# Patient Record
Sex: MALE | Race: WHITE | NOT HISPANIC OR LATINO | Employment: OTHER | URBAN - METROPOLITAN AREA
[De-identification: names, ages, dates, MRNs, and addresses within clinical notes are randomized per-mention and may not be internally consistent; named-entity substitution may affect disease eponyms.]

---

## 2019-03-04 ENCOUNTER — HOSPITAL ENCOUNTER (EMERGENCY)
Facility: HOSPITAL | Age: 64
Discharge: HOME | End: 2019-03-04
Attending: EMERGENCY MEDICINE | Admitting: EMERGENCY MEDICINE

## 2019-03-04 ENCOUNTER — APPOINTMENT (OUTPATIENT)
Dept: GENERAL RADIOLOGY | Facility: HOSPITAL | Age: 64
End: 2019-03-04

## 2019-03-04 VITALS
WEIGHT: 178.5 LBS | TEMPERATURE: 98.4 F | HEART RATE: 64 BPM | BODY MASS INDEX: 35.05 KG/M2 | DIASTOLIC BLOOD PRESSURE: 73 MMHG | SYSTOLIC BLOOD PRESSURE: 122 MMHG | RESPIRATION RATE: 15 BRPM | HEIGHT: 60 IN | OXYGEN SATURATION: 94 %

## 2019-03-04 DIAGNOSIS — J06.9 UPPER RESPIRATORY TRACT INFECTION, UNSPECIFIED TYPE: Primary | ICD-10-CM

## 2019-03-04 LAB
ALBUMIN SERPL-MCNC: 4.1 G/DL (ref 3.5–5.2)
ALBUMIN/GLOB SERPL: 1.4 G/DL
ALP SERPL-CCNC: 63 U/L (ref 39–117)
ALT SERPL W P-5'-P-CCNC: 17 U/L (ref 1–41)
ANION GAP SERPL CALCULATED.3IONS-SCNC: 14.6 MMOL/L
AST SERPL-CCNC: 19 U/L (ref 1–40)
BASOPHILS # BLD AUTO: 0.03 10*3/MM3 (ref 0–0.2)
BASOPHILS NFR BLD AUTO: 0.6 % (ref 0–1.5)
BILIRUB SERPL-MCNC: <0.2 MG/DL (ref 0.1–1.2)
BUN BLD-MCNC: 12 MG/DL (ref 8–23)
BUN/CREAT SERPL: 13 (ref 7–25)
CALCIUM SPEC-SCNC: 9.3 MG/DL (ref 8.6–10.5)
CHLORIDE SERPL-SCNC: 103 MMOL/L (ref 98–107)
CO2 SERPL-SCNC: 26.4 MMOL/L (ref 22–29)
CREAT BLD-MCNC: 0.92 MG/DL (ref 0.76–1.27)
D-LACTATE SERPL-SCNC: 1.5 MMOL/L (ref 0.5–2)
DEPRECATED RDW RBC AUTO: 53.3 FL (ref 37–54)
EOSINOPHIL # BLD AUTO: 0.28 10*3/MM3 (ref 0–0.4)
EOSINOPHIL NFR BLD AUTO: 6 % (ref 0.3–6.2)
ERYTHROCYTE [DISTWIDTH] IN BLOOD BY AUTOMATED COUNT: 15.3 % (ref 12.3–15.4)
GFR SERPL CREATININE-BSD FRML MDRD: 83 ML/MIN/1.73
GLOBULIN UR ELPH-MCNC: 3 GM/DL
GLUCOSE BLD-MCNC: 88 MG/DL (ref 65–99)
HCT VFR BLD AUTO: 40.2 % (ref 37.5–51)
HGB BLD-MCNC: 12.7 G/DL (ref 13–17.7)
LYMPHOCYTES # BLD AUTO: 2.06 10*3/MM3 (ref 0.7–3.1)
LYMPHOCYTES NFR BLD AUTO: 43.9 % (ref 19.6–45.3)
MCH RBC QN AUTO: 29.7 PG (ref 26.6–33)
MCHC RBC AUTO-ENTMCNC: 31.6 G/DL (ref 31.5–35.7)
MCV RBC AUTO: 94.1 FL (ref 79–97)
MONOCYTES # BLD AUTO: 0.5 10*3/MM3 (ref 0.1–0.9)
MONOCYTES NFR BLD AUTO: 10.7 % (ref 5–12)
NEUTROPHILS # BLD AUTO: 1.82 10*3/MM3 (ref 1.4–7)
NEUTROPHILS NFR BLD AUTO: 38.8 % (ref 42.7–76)
PLATELET # BLD AUTO: 127 10*3/MM3 (ref 140–450)
PMV BLD AUTO: 10.6 FL (ref 6–12)
POTASSIUM BLD-SCNC: 4.3 MMOL/L (ref 3.5–5.2)
PROT SERPL-MCNC: 7.1 G/DL (ref 6–8.5)
RBC # BLD AUTO: 4.27 10*6/MM3 (ref 4.14–5.8)
SODIUM BLD-SCNC: 144 MMOL/L (ref 136–145)
WBC NRBC COR # BLD: 4.69 10*3/MM3 (ref 3.4–10.8)

## 2019-03-04 PROCEDURE — 83605 ASSAY OF LACTIC ACID: CPT | Performed by: EMERGENCY MEDICINE

## 2019-03-04 PROCEDURE — 93005 ELECTROCARDIOGRAM TRACING: CPT | Performed by: EMERGENCY MEDICINE

## 2019-03-04 PROCEDURE — 85025 COMPLETE CBC W/AUTO DIFF WBC: CPT | Performed by: EMERGENCY MEDICINE

## 2019-03-04 PROCEDURE — 99285 EMERGENCY DEPT VISIT HI MDM: CPT

## 2019-03-04 PROCEDURE — 87040 BLOOD CULTURE FOR BACTERIA: CPT | Performed by: EMERGENCY MEDICINE

## 2019-03-04 PROCEDURE — 93010 ELECTROCARDIOGRAM REPORT: CPT | Performed by: INTERNAL MEDICINE

## 2019-03-04 PROCEDURE — 71045 X-RAY EXAM CHEST 1 VIEW: CPT

## 2019-03-04 PROCEDURE — 80053 COMPREHEN METABOLIC PANEL: CPT | Performed by: EMERGENCY MEDICINE

## 2019-03-04 PROCEDURE — 94640 AIRWAY INHALATION TREATMENT: CPT

## 2019-03-04 RX ORDER — POLYETHYLENE GLYCOL 3350 17 G/17G
17 POWDER, FOR SOLUTION ORAL DAILY
COMMUNITY
End: 2021-08-23 | Stop reason: SDUPTHER

## 2019-03-04 RX ORDER — BACLOFEN 10 MG/1
10 TABLET ORAL 2 TIMES DAILY
COMMUNITY
End: 2022-03-16 | Stop reason: HOSPADM

## 2019-03-04 RX ORDER — SODIUM CHLORIDE 0.9 % (FLUSH) 0.9 %
10 SYRINGE (ML) INJECTION AS NEEDED
Status: DISCONTINUED | OUTPATIENT
Start: 2019-03-04 | End: 2019-03-04 | Stop reason: HOSPADM

## 2019-03-04 RX ORDER — LAMOTRIGINE 100 MG/1
100 TABLET ORAL 2 TIMES DAILY
COMMUNITY

## 2019-03-04 RX ORDER — OMEPRAZOLE 40 MG/1
40 CAPSULE, DELAYED RELEASE ORAL EVERY 12 HOURS
COMMUNITY

## 2019-03-04 RX ORDER — BISACODYL 10 MG
10 SUPPOSITORY, RECTAL RECTAL DAILY
COMMUNITY

## 2019-03-04 RX ORDER — SENNOSIDES 8.6 MG
3 TABLET ORAL 2 TIMES DAILY
COMMUNITY

## 2019-03-04 RX ORDER — ASPIRIN 81 MG/1
81 TABLET, CHEWABLE ORAL DAILY
Status: ON HOLD | COMMUNITY
End: 2021-10-12 | Stop reason: SDUPTHER

## 2019-03-04 RX ORDER — ISOSORBIDE DINITRATE 20 MG/1
20 TABLET ORAL EVERY 12 HOURS
COMMUNITY

## 2019-03-04 RX ORDER — IPRATROPIUM BROMIDE AND ALBUTEROL SULFATE 2.5; .5 MG/3ML; MG/3ML
3 SOLUTION RESPIRATORY (INHALATION) EVERY 6 HOURS PRN
COMMUNITY
Start: 2019-03-01 | End: 2019-03-08

## 2019-03-04 RX ORDER — IPRATROPIUM BROMIDE AND ALBUTEROL SULFATE 2.5; .5 MG/3ML; MG/3ML
3 SOLUTION RESPIRATORY (INHALATION) ONCE
Status: COMPLETED | OUTPATIENT
Start: 2019-03-04 | End: 2019-03-04

## 2019-03-04 RX ORDER — LEVETIRACETAM 1000 MG/1
1000 TABLET ORAL 2 TIMES DAILY
COMMUNITY
End: 2022-03-16 | Stop reason: HOSPADM

## 2019-03-04 RX ORDER — EZETIMIBE 10 MG/1
10 TABLET ORAL DAILY
COMMUNITY

## 2019-03-04 RX ORDER — MONTELUKAST SODIUM 10 MG/1
10 TABLET ORAL NIGHTLY
COMMUNITY

## 2019-03-04 RX ORDER — DANTROLENE SODIUM 25 MG/1
25 CAPSULE ORAL DAILY
Status: ON HOLD | COMMUNITY
End: 2022-09-28

## 2019-03-04 RX ORDER — MOMETASONE FUROATE 50 UG/1
2 SPRAY, METERED NASAL DAILY
Status: ON HOLD | COMMUNITY
End: 2022-09-28

## 2019-03-04 RX ORDER — ALBUTEROL SULFATE 2.5 MG/3ML
2.5 SOLUTION RESPIRATORY (INHALATION) EVERY 4 HOURS PRN
COMMUNITY
Start: 2019-03-03

## 2019-03-04 RX ORDER — CHOLECALCIFEROL (VITAMIN D3) 25 MCG
1000 CAPSULE ORAL 2 TIMES DAILY
COMMUNITY
End: 2022-03-16 | Stop reason: HOSPADM

## 2019-03-04 RX ORDER — OSELTAMIVIR PHOSPHATE 75 MG/1
75 CAPSULE ORAL 2 TIMES DAILY
COMMUNITY
Start: 2019-03-01 | End: 2019-03-06

## 2019-03-04 RX ORDER — LEVOTHYROXINE SODIUM 0.12 MG/1
125 TABLET ORAL DAILY
COMMUNITY

## 2019-03-04 RX ORDER — PAROXETINE HYDROCHLORIDE 20 MG/1
40 TABLET, FILM COATED ORAL NIGHTLY
COMMUNITY
End: 2021-08-23 | Stop reason: DRUGHIGH

## 2019-03-04 RX ADMIN — IPRATROPIUM BROMIDE AND ALBUTEROL SULFATE 3 ML: 2.5; .5 SOLUTION RESPIRATORY (INHALATION) at 06:54

## 2019-03-04 NOTE — ED NOTES
Abigail ROSARIO from NH states Pt was Dx with Flu and has been takingTamiflu x3 days. Pt had intermittent fevers which have been treated with Tylenol.  Pt's lab work came back with elevated WBC so a chest Xray was done showing PNA.  Pt's baseline of no o2 with normal sats at 96%.  Pt has caregiver at bedside who states Pt's baseline is only to speak a few words.     Diamond Reardon RN  03/04/19 0600

## 2019-03-04 NOTE — ED PROVIDER NOTES
Pt presents to the ED c/o flu-like symptoms that started a few days ago. Pt was brought in from [facility] today for further evaluation after he had a CXR taken yesterday which showed pneumonia. Pt was dx with influenza 2 days ago. Pt has been taking tamiflu for tx of symptoms.    PHYSICAL EXAM  GENERAL: not distressed  CV: regular rhythm, regular rate  RESPIRATORY: normal effort, no respiratory distress, scattered rhonchi bilaterally, pt's O2 sats are 95% on room air  ABDOMEN: soft, non-tender    Vital signs and nursing notes reviewed.    LAB RESULTS AND RADIOLOGY  I have reviewed the patient's labs and imaging studies. Labs are unremarkable and his CXR is negative acute.     PROGRESS NOTES    0800 Spoke to midlevel provider Talisha Hearn PA-C, about the pt. After performing my own physical exam, I agree with the plan to discharge the pt home.    Attestation:    The JILL and I have discussed this patient's history, physical exam, and treatment plan.  I have reviewed the documentation and personally had a face to face interaction with the patient. I affirm the documentation and agree with the treatment and plan.  The attached note describes my personal findings.    Documentation assistance provided by mendoza Rosado for Dr. Garcia. Information recorded by the scribe was done at my direction and has been verified and validated by me.          Jose Rosado  03/04/19 3020       Robb Garcia MD  03/04/19 1731

## 2019-03-04 NOTE — DISCHARGE INSTRUCTIONS
Continue nebs as scheduled.  Follow up with Primary care provider in 1- 2 days for recheck.  Return to ER for low oxygen, shortness of breath, worsening symptoms.

## 2019-03-04 NOTE — ED NOTES
Called [facility] for transport back to nursing home. ETA 2 hours     Genesis Dang RN  03/04/19 0836

## 2019-03-09 LAB
BACTERIA SPEC AEROBE CULT: NORMAL
BACTERIA SPEC AEROBE CULT: NORMAL

## 2020-03-10 ENCOUNTER — TRANSCRIBE ORDERS (OUTPATIENT)
Dept: ADMINISTRATIVE | Facility: HOSPITAL | Age: 65
End: 2020-03-10

## 2020-03-10 DIAGNOSIS — R13.14 PHARYNGOESOPHAGEAL DYSPHAGIA: Primary | ICD-10-CM

## 2020-03-17 ENCOUNTER — HOSPITAL ENCOUNTER (OUTPATIENT)
Dept: GENERAL RADIOLOGY | Facility: HOSPITAL | Age: 65
Discharge: HOME | End: 2020-03-17
Admitting: INTERNAL MEDICINE

## 2020-03-17 DIAGNOSIS — R13.14 PHARYNGOESOPHAGEAL DYSPHAGIA: ICD-10-CM

## 2020-03-17 PROCEDURE — 92611 MOTION FLUOROSCOPY/SWALLOW: CPT

## 2020-03-17 PROCEDURE — 63710000001 BARIUM SULFATE 40 % SUSPENSION: Performed by: INTERNAL MEDICINE

## 2020-03-17 PROCEDURE — A9270 NON-COVERED ITEM OR SERVICE: HCPCS | Performed by: INTERNAL MEDICINE

## 2020-03-17 PROCEDURE — 63710000001 BARIUM SULFATE 98 % RECONSTITUTED SUSPENSION: Performed by: INTERNAL MEDICINE

## 2020-03-17 PROCEDURE — 74230 X-RAY XM SWLNG FUNCJ C+: CPT

## 2020-03-17 PROCEDURE — 63710000001 BARIUM SULFATE 40 % RECONSTITUTED SUSPENSION: Performed by: INTERNAL MEDICINE

## 2020-03-17 RX ADMIN — BARIUM SULFATE 55 ML: 0.81 POWDER, FOR SUSPENSION ORAL at 11:37

## 2020-03-17 RX ADMIN — BARIUM SULFATE 50 ML: 400 SUSPENSION ORAL at 11:38

## 2020-03-17 RX ADMIN — BARIUM SULFATE 4 ML: 980 POWDER, FOR SUSPENSION ORAL at 11:37

## 2020-03-17 NOTE — MBS/VFSS/FEES
Outpatient Speech Language Pathology   Adult Swallow Initial Evaluation  [facility]     Patient Name: Ned Basilio  : 1955  MRN: [id number]  Today's Date: 3/17/2020         Visit Date: 2020   There is no problem list on file for this patient.       Past Medical History:   Diagnosis Date   • Anxiety    • Arthritis    • Sommers's esophagus    • Cardiomegaly    • Cerebral palsy (CMS/HCC)    • Constipation    • Disease of thyroid gland    • GERD (gastroesophageal reflux disease)    • Hyperlipidemia    • Osteoporosis    • Parkinson disease (CMS/HCC)    • Raynaud's disease    • Right hemiplegia (CMS/HCC)    • Seizures (CMS/HCC)         History reviewed. No pertinent surgical history.      Visit Dx:     ICD-10-CM ICD-9-CM   1. Pharyngoesophageal dysphagia R13.14 787.24           SLP Adult Swallow Evaluation     Row Name 20 1708       Rehab Evaluation    Document Type  evaluation  -AW    Subjective Information  no complaints  -AW    Patient Observations  alert;cooperative;agree to therapy  -AW    Patient/Family Observations  Pt alert, nonverbal, looked at SLP and smiled, did not follow commands.  -AW    Patient Effort  adequate  -AW    Symptoms Noted During/After Treatment  none  -AW       General Information    Patient Profile Reviewed  yes  -AW    Pertinent History Of Current Problem  Pt with h/o cerbral palsy and recent pneumonia and flu. Pt accompanied by Speech Therapist.  -AW    Current Method of Nutrition  pureed;thin liquids  -AW    Precautions/Limitations, Vision  WFL;for purposes of eval  -AW    Precautions/Limitations, Hearing  WFL;for purposes of eval  -AW    Prior Level of Function-Communication  motor speech impairment;cognitive-linguistic impairment  -AW    Prior Level of Function-Swallowing  puree;thin liquids  -AW    Plans/Goals Discussed with  patient;agreed upon;other (see comments) SLP from facility  -AW    Barriers to Rehab  medically complex;previous functional deficit;cognitive  status  -AW    Patient's Goals for Discharge  patient did not state  -AW       Pain Assessment    Additional Documentation  Pain Scale: Numbers Pre/Post-Treatment (Group)  -AW       Pain Scale: Numbers Pre/Post-Treatment    Pain Scale: Numbers, Pretreatment  0/10 - no pain  -AW    Pain Scale: Numbers, Post-Treatment  0/10 - no pain  -AW       Oral Motor and Function    Dentition Assessment  natural, present and adequate;missing teeth  -AW    Secretion Management  WNL/WFL  -AW    Mucosal Quality  moist, healthy  -AW    Volitional Swallow  unable to elicit  -AW    Volitional Cough  unable to elicit  -AW       Oral Musculature and Cranial Nerve Assessment    Oral Motor General Assessment  unable to assess  -AW       General Eating/Swallowing Observations    Respiratory Support Currently in Use  room air  -AW    Eating/Swallowing Skills  fed by SLP  -AW    Positioning During Eating  upright in chair  -AW       MBS/VFSS    Utensils Used  spoon;straw  -AW    Consistencies Trialed  pureed;thin liquids;nectar/syrup-thick liquids  -AW       MBS/VFSS Interpretation    Oral Prep Phase  impaired oral phase of swallowing  -AW    Oral Transit Phase  impaired  -AW    Oral Residue  impaired  -AW    VFSS Summary  Pt presented with moderate oropharyngeal dysphagia characterized by decreased lingual and base of tongue strength, decreased pharyngeal peristalsis, and delayed swallow. Pt with noted L anterior spillage during eval with significant oral residue which pt was unable to clear with cues or liquid wash. Pt given straw trials of thin and nectar and teaspoon trials of pureed. Pt's swallow was delayed 1-9 seconds with all consistencies spilling to the pyriforms. Pt's shoulder blocked full view of pyriforms. No obvious penetration or aspiration was observed during the study, however, the risk is high due to delay and mild to moderate diffuse pharyngeal residual with all consistencies (tongue base, valleculae, pyriforms). Pt was not  able to follow commands to initiate a dry swallow to assist in clearing, and pt did not appear to sensate residuals. Swallow appeared to fatigue as study progressed with longer delay noted. Pt eventually refused to open mouth for additional trials. Recommend pt continue on pureed and thin via straw (single sip and small bites) with 1:1 supervision during meals to adhere to strict swallow strategies. If s/s are noted consider downgrade to nectar thick.   -AW       Initiation of Pharyngeal Swallow    Pharyngeal Phase  impaired pharyngeal phase of swallowing  -AW       SLP Communication to Radiology    Summary Statement  Pt presented with moderate oropharyngeal dysphagia characterized by decreased lingual and base of tongue strength, decreased pharyngeal peristalsis, and delayed swallow. Pt with noted L anterior spillage during eval with significant oral residue which pt was unable to clear with cues or liquid wash. Pt given straw trials of thin and nectar and teaspoon trials of pureed. Pt's swallow was delayed 1-9 seconds with all consistencies spilling to the pyriforms. Pt's shoulder blocked full view of pyriforms. No obvious penetration or aspiration was observed during the study, however, the risk is high due to delay and mild to moderate diffuse pharyngeal residual with all consistencies (tongue base, valleculae, pyriforms)  -AW       Clinical Impression    SLP Swallowing Diagnosis  moderate;oral dysfunction;pharyngeal dysfunction  -AW    Functional Impact  risk of aspiration/pneumonia;risk of malnutrition;risk of dehydration  -AW    Swallow Criteria for Skilled Therapeutic Interventions Met  other (see comments);demonstrates skilled criteria for education on swallow strategies  -AW       Recommendations    SLP Diet Recommendation  puree;thin liquids  -AW    Recommended Precautions and Strategies  upright posture during/after eating;small bites of food and sips of liquid;check mouth frequently for oral  residue/pocketing;alternate between small bites of food and sips of liquid  -AW    SLP Rec. for Method of Medication Administration  meds crushed;with pudding or applesauce  -AW    Monitor for Signs of Aspiration  yes  -AW    Anticipated Dischage Disposition  extended care facility  -AW      User Key  (r) = Recorded By, (t) = Taken By, (c) = Cosigned By    Initials Name Provider Type    MASHA HowardenCharleen MS CCC-SLP Speech and Language Pathologist                        OP SLP Education     Row Name 03/17/20 1807       Education    Barriers to Learning  Decreased comprehension  -AW    Action Taken to Address Barriers  Education completed with pt's SLP.  -AW    Education Provided  Described results of evaluation;Family/caregivers expressed understanding of evaluation;Family/caregivers participated in establishing goals and treatment plan;Family/caregivers demonstrated recommended strategies  -AW    Assessed  Learning needs;Learning motivation;Learning preferences;Learning readiness  -AW    Learning Motivation  Strong  -AW    Learning Method  Explanation;Demonstration;Teach back  -AW    Teaching Response  Verbalized understanding;Demonstrated understanding  -AW      User Key  (r) = Recorded By, (t) = Taken By, (c) = Cosigned By    Initials Name Effective Dates    Charleen Li MS CCC-SLP 06/08/18 -               OP SLP Assessment/Plan - 03/17/20 1806        SLP Assessment    Functional Problems  Swallowing   -AW    Clinical Impression: Swallowing  Moderate:;oropharyngeal phase dysphagia   -AW    Prognosis  Good (comment)   -AW    Patient/caregiver participated in establishment of treatment plan and goals  Yes    Pt's SLP present.  -AW    Patient would benefit from skilled therapy intervention  Yes    For caregiver education  -AW       SLP Plan    Planned CPT's?  SLP SWALLOW THERAPY: 01821   -AW    Expected Duration Therapy Session - minutes  45-60 minutes   -AW      User Key  (r) = Recorded By, (t) = Taken By, (c) =  Cosigned By    Initials Name Provider Type    Charleen Li MS CCC-SLP Speech and Language Pathologist              SLP Outcome Measures (last 72 hours)      SLP Outcome Measures     Row Name 03/17/20 1800             SLP Outcome Measures    Outcome Measure Used?  Adult NOMS  -AW         Adult FCM Scores    FCM Chosen  Swallowing  -AW      Swallowing FCM Score  4  -AW        User Key  (r) = Recorded By, (t) = Taken By, (c) = Cosigned By    Initials Name Effective Dates    Charleen Li MS CCC-SLP 06/08/18 -                Time Calculation:   SLP Start Time: 1100  SLP Stop Time: 1215  SLP Time Calculation (min): 75 min    Therapy Charges for Today     Code Description Service Date Service Provider Modifiers Qty    [id number] HC ST MOTION FLUORO EVAL SWALLOW 5 3/17/2020 Charleen Ann MS CCC-SLP GN 1                   Charleen Ann MS CCC-SLP  3/17/2020

## 2020-12-10 ENCOUNTER — LAB REQUISITION (OUTPATIENT)
Dept: LAB | Facility: HOSPITAL | Age: 65
End: 2020-12-10

## 2020-12-10 DIAGNOSIS — Z00.00 ENCOUNTER FOR GENERAL ADULT MEDICAL EXAMINATION WITHOUT ABNORMAL FINDINGS: ICD-10-CM

## 2020-12-10 PROCEDURE — U0004 COV-19 TEST NON-CDC HGH THRU: HCPCS | Performed by: OPHTHALMOLOGY

## 2020-12-11 LAB — SARS-COV-2 RNA RESP QL NAA+PROBE: NOT DETECTED

## 2021-01-19 NOTE — PROGRESS NOTES
No chief complaint on file.  Sommers's esophagus, GERD, history of colon polyps          History of Present Illness  65-year old male presents today for follow up. He is a resident of [place]. He has been accompanied by a caregiver during his visit to assist in answering questions. Patient is non-verbal. His caregiver reports abdominal distention. He has not had any recent imaging.     His last colonoscopy and EGD were performed on 1/24/2019 due to his history of iron deficiency anemia, revealing a 4 mm polyp in the transverse colon, identified as a tubular adenoma on pathology, and melanotic mucosa throughout the colon.  Due to poor bowel prep, patient was recommended to undergo his colonoscopy at a 1 year interval, which would have been due on 1/24/2020.    EGD revealed mucosal findings consistent with short segment Sommers's esophagus, which were confirmed on biopsy. Next EGD for surveillance of Sommers's esophagus will be due at a 3-year interval on 1/24/2022.    He takes omeprazole 40 mg twice daily.          Result Review :          Vital Signs:   /80   Pulse 67   Temp 97.1 °F (36.2 °C)   Resp 20   SpO2 94%     There is no height or weight on file to calculate BMI.     Physical Exam  Vitals signs reviewed.   Constitutional:       Appearance: Normal appearance.   Abdominal:      General: Bowel sounds are normal. There is distension.      Palpations: Abdomen is soft. Abdomen is not rigid. There is no mass or pulsatile mass.      Tenderness: There is no abdominal tenderness. There is no guarding or rebound.             Assessment and Plan      Diagnoses and all orders for this visit:    1. Sommers's esophagus without dysplasia (Primary)  Comments:  Patient due for surveillance in January 2022.    2. Gastroesophageal reflux disease without esophagitis    3. History of colon polyps  Comments:  Patient was due for surveillance in January 2020 due to poor prep on prior colonoscopy.    4. Abdominal  distention  Comments:  new    Other orders  -     XR Abdomen KUB; Future         Brief Summary  We will send the patient x-ray today for an abdominal x-ray due to abdominal distention I will schedule his surveillance colonoscopy.           Follow Up   No follow-ups on file.     Patient Instructions   1. For colon cancer screening, we will schedule a colonoscopy.    2. For abdominal distention we will order an abdominal x-ray today for further evaluation.

## 2021-01-20 ENCOUNTER — PREP FOR SURGERY (OUTPATIENT)
Dept: OTHER | Facility: HOSPITAL | Age: 66
End: 2021-01-20

## 2021-01-20 ENCOUNTER — OFFICE VISIT (OUTPATIENT)
Dept: GASTROENTEROLOGY | Facility: CLINIC | Age: 66
End: 2021-01-20

## 2021-01-20 ENCOUNTER — HOSPITAL ENCOUNTER (OUTPATIENT)
Dept: GENERAL RADIOLOGY | Facility: HOSPITAL | Age: 66
Discharge: HOME | End: 2021-01-20
Admitting: INTERNAL MEDICINE

## 2021-01-20 VITALS
SYSTOLIC BLOOD PRESSURE: 122 MMHG | OXYGEN SATURATION: 94 % | DIASTOLIC BLOOD PRESSURE: 80 MMHG | HEART RATE: 67 BPM | TEMPERATURE: 97.1 F | RESPIRATION RATE: 20 BRPM

## 2021-01-20 DIAGNOSIS — Z86.010 HISTORY OF COLON POLYPS: ICD-10-CM

## 2021-01-20 DIAGNOSIS — R14.0 ABDOMINAL DISTENTION: ICD-10-CM

## 2021-01-20 DIAGNOSIS — K22.70 BARRETT'S ESOPHAGUS WITHOUT DYSPLASIA: Primary | Chronic | ICD-10-CM

## 2021-01-20 DIAGNOSIS — Z12.11 COLON CANCER SCREENING: ICD-10-CM

## 2021-01-20 DIAGNOSIS — Z86.010 HISTORY OF COLON POLYPS: Primary | ICD-10-CM

## 2021-01-20 DIAGNOSIS — K21.9 GASTROESOPHAGEAL REFLUX DISEASE WITHOUT ESOPHAGITIS: Chronic | ICD-10-CM

## 2021-01-20 PROBLEM — Z86.0100 HISTORY OF COLON POLYPS: Status: ACTIVE | Noted: 2021-01-20

## 2021-01-20 PROCEDURE — 74018 RADEX ABDOMEN 1 VIEW: CPT

## 2021-01-20 PROCEDURE — 99214 OFFICE O/P EST MOD 30 MIN: CPT | Performed by: INTERNAL MEDICINE

## 2021-01-20 RX ORDER — FLUTICASONE PROPIONATE 50 MCG
SPRAY, SUSPENSION (ML) NASAL
COMMUNITY
Start: 2021-01-13

## 2021-01-20 RX ORDER — CHLORHEXIDINE GLUCONATE 0.12 MG/ML
RINSE ORAL
COMMUNITY
Start: 2021-01-13

## 2021-01-20 RX ORDER — TRAZODONE HYDROCHLORIDE 100 MG/1
TABLET ORAL
Status: ON HOLD | COMMUNITY
Start: 2021-01-13 | End: 2022-03-09

## 2021-01-20 NOTE — PATIENT INSTRUCTIONS
1. For colon cancer screening, we will schedule a colonoscopy.    2. For abdominal distention we will order an abdominal x-ray today for further evaluation.

## 2021-01-22 RX ORDER — SIMETHICONE 80 MG
160 TABLET,CHEWABLE ORAL 2 TIMES DAILY
Qty: 120 TABLET | Refills: 5 | Status: SHIPPED | OUTPATIENT
Start: 2021-01-22 | End: 2021-06-15

## 2021-01-22 NOTE — TELEPHONE ENCOUNTER
Recommend simethicone 160 mg tablets 2 p.o. daily.  Okay to do tapwater enemas as needed.  Also recommend increase activity level as much as possible to mobilize the gas.     Please approve for me, thanks

## 2021-02-22 NOTE — TELEPHONE ENCOUNTER
Can you send in the bowel prep for this patient. Pharmacy verified correct in chart. Jennifer received a call from the patient statin it was not at the pharmacy. He has Plenvu prep instructions.       Thanks,  David

## 2021-03-02 ENCOUNTER — TRANSCRIBE ORDERS (OUTPATIENT)
Dept: SLEEP MEDICINE | Facility: HOSPITAL | Age: 66
End: 2021-03-02

## 2021-03-02 DIAGNOSIS — Z01.818 OTHER SPECIFIED PRE-OPERATIVE EXAMINATION: Primary | ICD-10-CM

## 2021-03-02 NOTE — TELEPHONE ENCOUNTER
Hi, with [facility], called stating Mr. Basilio is still having issues with bloating and gas. He was seen on 1/20/21 for this issue and on 1/22/21 began Simethicone 160mg twice daily with no relief. Would like to know what else to try? Patient is aware of upcoming Colonoscopy on 3/15/21.

## 2021-03-12 ENCOUNTER — LAB (OUTPATIENT)
Dept: LAB | Facility: HOSPITAL | Age: 66
End: 2021-03-12

## 2021-03-12 DIAGNOSIS — Z01.818 OTHER SPECIFIED PRE-OPERATIVE EXAMINATION: ICD-10-CM

## 2021-03-12 PROCEDURE — U0005 INFEC AGEN DETEC AMPLI PROBE: HCPCS

## 2021-03-12 PROCEDURE — C9803 HOPD COVID-19 SPEC COLLECT: HCPCS

## 2021-03-12 PROCEDURE — U0004 COV-19 TEST NON-CDC HGH THRU: HCPCS

## 2021-03-13 LAB — SARS-COV-2 ORF1AB RESP QL NAA+PROBE: NOT DETECTED

## 2021-03-15 ENCOUNTER — TELEPHONE (OUTPATIENT)
Dept: GASTROENTEROLOGY | Facility: CLINIC | Age: 66
End: 2021-03-15

## 2021-03-15 NOTE — TELEPHONE ENCOUNTER
at [place] ([facility] ) called and stated pt was not going to make his procedure this morning, we rescheduled to May , moved appt with Renetta and made endo aware

## 2021-03-22 ENCOUNTER — BULK ORDERING (OUTPATIENT)
Dept: CASE MANAGEMENT | Facility: OTHER | Age: 66
End: 2021-03-22

## 2021-03-22 DIAGNOSIS — Z23 IMMUNIZATION DUE: ICD-10-CM

## 2021-04-27 ENCOUNTER — TRANSCRIBE ORDERS (OUTPATIENT)
Dept: SLEEP MEDICINE | Facility: HOSPITAL | Age: 66
End: 2021-04-27

## 2021-04-27 DIAGNOSIS — Z01.818 OTHER SPECIFIED PRE-OPERATIVE EXAMINATION: Primary | ICD-10-CM

## 2021-04-27 RX ORDER — POLYETHYLENE GLYCOL 3350, SODIUM SULFATE, SODIUM CHLORIDE, POTASSIUM CHLORIDE, ASCORBIC ACID, SODIUM ASCORBATE 140-9-5.2G
KIT ORAL
Qty: 3 EACH | Refills: 0 | Status: ON HOLD | OUTPATIENT
Start: 2021-04-27 | End: 2022-03-11

## 2021-05-14 ENCOUNTER — LAB (OUTPATIENT)
Dept: LAB | Facility: HOSPITAL | Age: 66
End: 2021-05-14

## 2021-05-14 DIAGNOSIS — Z01.818 OTHER SPECIFIED PRE-OPERATIVE EXAMINATION: ICD-10-CM

## 2021-05-14 PROCEDURE — C9803 HOPD COVID-19 SPEC COLLECT: HCPCS

## 2021-05-14 PROCEDURE — U0005 INFEC AGEN DETEC AMPLI PROBE: HCPCS

## 2021-05-14 PROCEDURE — U0004 COV-19 TEST NON-CDC HGH THRU: HCPCS

## 2021-05-15 LAB — SARS-COV-2 ORF1AB RESP QL NAA+PROBE: NOT DETECTED

## 2021-06-15 RX ORDER — SIMETHICONE 80 MG/1
TABLET, CHEWABLE ORAL
Qty: 120 TABLET | Refills: 11 | Status: ON HOLD | OUTPATIENT
Start: 2021-06-15 | End: 2022-09-28

## 2021-08-11 NOTE — PROGRESS NOTES
"Chief Complaint   Patient presents with   • Bloated         History of Present Illness  Patient is a 65-year-old male who presents today for follow-up. He has a history of colon polyps, Sommers's esophagus.  He resides at [facility] and is nonverbal and in a wheelchair.  Caregiver assists with history.    He is currently scheduled for a colonoscopy for screening purposes and surveillance of polyps October 2021 due to poor prep on attempted colonoscopy in 2019. An abdominal x-ray performed January 2021 showed gaseous distention of the right colon.    Patient presents today for follow-up.  His caregivers have noticed that his abdomen is becoming more bloated.  At times it is visibly distended and firm.  He has had no reported vomiting.  Currently on a bowel regimen consisting of Senokot, MiraLAX, stool softeners, and enemas as needed and he is having regular bowel movements with no visible bleeding.    He follows a puréed diet but has been eating well with good appetite.  Patient is nonverbal but caregiver reports he has not appeared uncomfortable or in pain.       Result Review :       PROGRESS NOTES - SCAN - MAE ERAZO MD (12/10/2018)   SCANNED - COLONOSCOPY (01/24/2019)   XR Abdomen KUB (01/20/2021 14:18)   Office Visit with Cedric Erazo MD (01/20/2021)       Vital Signs:   /80   Temp 97.8 °F (36.6 °C) (Temporal)   Resp 18   Ht 152.4 cm (60\")   Wt 103 kg (228 lb)   BMI 44.53 kg/m²     Body mass index is 44.53 kg/m².     Physical Exam  Vitals reviewed.   Constitutional:       General: He is not in acute distress.     Appearance: He is well-developed.   HENT:      Head: Normocephalic and atraumatic.   Pulmonary:      Effort: Pulmonary effort is normal. No respiratory distress.   Abdominal:      General: Bowel sounds are decreased. There is distension.      Tenderness: There is no abdominal tenderness.   Skin:     General: Skin is dry.      Coloration: Skin is not pale.   Neurological: "      Mental Status: He is alert and oriented to person, place, and time.   Psychiatric:         Thought Content: Thought content normal.           Assessment and Plan    Diagnoses and all orders for this visit:    1. Bloating (Primary)  -     CT Abdomen Pelvis With Contrast; Future    2. History of colon polyps    3. Abdominal distention         Discussion  Patient presents today for evaluation of worsening abdominal bloating and distention.  Exam in the office today with abdominal distention and somewhat hypoactive bowel sounds.  We will schedule CT abdomen pelvis with contrast to evaluate for any evidence of obstruction.  As caregivers report regular bowel movements with current regimen, will continue current bowel regimen at this time however if there is a large stool burden on imaging, we may need to adjust regimen.          Follow Up   Return if symptoms worsen or fail to improve.    There are no Patient Instructions on file for this visit.

## 2021-08-23 ENCOUNTER — OFFICE VISIT (OUTPATIENT)
Dept: GASTROENTEROLOGY | Facility: CLINIC | Age: 66
End: 2021-08-23

## 2021-08-23 VITALS
BODY MASS INDEX: 44.76 KG/M2 | TEMPERATURE: 97.8 F | RESPIRATION RATE: 18 BRPM | SYSTOLIC BLOOD PRESSURE: 132 MMHG | HEIGHT: 60 IN | WEIGHT: 228 LBS | DIASTOLIC BLOOD PRESSURE: 80 MMHG

## 2021-08-23 DIAGNOSIS — R14.0 BLOATING: Primary | ICD-10-CM

## 2021-08-23 DIAGNOSIS — R14.0 ABDOMINAL DISTENTION: ICD-10-CM

## 2021-08-23 DIAGNOSIS — Z86.010 HISTORY OF COLON POLYPS: ICD-10-CM

## 2021-08-23 PROBLEM — E03.9 HYPOTHYROIDISM: Status: ACTIVE | Noted: 2021-08-23

## 2021-08-23 PROCEDURE — 99214 OFFICE O/P EST MOD 30 MIN: CPT | Performed by: NURSE PRACTITIONER

## 2021-08-23 RX ORDER — ZINC OXIDE 13 %
CREAM (GRAM) TOPICAL
COMMUNITY
Start: 2021-08-18

## 2021-08-23 RX ORDER — MULTIVITAMIN,THER AND MINERALS
TABLET ORAL
COMMUNITY
Start: 2021-08-13

## 2021-08-23 RX ORDER — POLYETHYLENE GLYCOL 3350 17 G/17G
POWDER, FOR SOLUTION ORAL
COMMUNITY
Start: 2021-07-14 | End: 2021-10-20 | Stop reason: HOSPADM

## 2021-08-23 RX ORDER — PAROXETINE 30 MG/1
TABLET, FILM COATED ORAL
Status: ON HOLD | COMMUNITY
Start: 2021-08-13 | End: 2022-03-11

## 2021-09-22 ENCOUNTER — APPOINTMENT (OUTPATIENT)
Dept: CT IMAGING | Facility: HOSPITAL | Age: 66
End: 2021-09-22

## 2021-09-28 ENCOUNTER — HOSPITAL ENCOUNTER (OUTPATIENT)
Dept: CT IMAGING | Facility: HOSPITAL | Age: 66
Discharge: HOME | End: 2021-09-28
Admitting: NURSE PRACTITIONER

## 2021-09-28 DIAGNOSIS — R14.0 BLOATING: ICD-10-CM

## 2021-09-28 LAB — CREAT BLDA-MCNC: 0.9 MG/DL (ref 0.6–1.3)

## 2021-09-28 PROCEDURE — 74177 CT ABD & PELVIS W/CONTRAST: CPT

## 2021-09-28 PROCEDURE — 82565 ASSAY OF CREATININE: CPT

## 2021-09-28 PROCEDURE — 25010000002 IOPAMIDOL 61 % SOLUTION: Performed by: NURSE PRACTITIONER

## 2021-09-28 PROCEDURE — 0 DIATRIZOATE MEGLUMINE & SODIUM PER 1 ML: Performed by: NURSE PRACTITIONER

## 2021-09-28 RX ADMIN — DIATRIZOATE MEGLUMINE AND DIATRIZOATE SODIUM 30 ML: 660; 100 LIQUID ORAL; RECTAL at 14:39

## 2021-09-28 RX ADMIN — IOPAMIDOL 85 ML: 612 INJECTION, SOLUTION INTRAVENOUS at 14:39

## 2021-10-05 ENCOUNTER — TELEPHONE (OUTPATIENT)
Dept: GASTROENTEROLOGY | Facility: CLINIC | Age: 66
End: 2021-10-05

## 2021-10-05 NOTE — TELEPHONE ENCOUNTER
We had a voicemail left 10/04/21 at 1:08pm from (what it sounded like) Jovanny Bailon with [facility] wanting to r/s pt CLS. Please call to get r/s. Thanks.

## 2021-10-06 NOTE — TELEPHONE ENCOUNTER
Spoke with Jonathon from pts [facility] on 10/05/2021 and advised that pt would have to be done at [facility] and that the schedule is not out that far yet and that we would call to schedule once next years schedule is out. She gave phone number [phone] to call back.

## 2021-10-07 ENCOUNTER — TELEPHONE (OUTPATIENT)
Dept: GASTROENTEROLOGY | Facility: CLINIC | Age: 66
End: 2021-10-07

## 2021-10-07 DIAGNOSIS — K59.04 CHRONIC IDIOPATHIC CONSTIPATION: Primary | ICD-10-CM

## 2021-10-07 RX ORDER — PLECANATIDE 3 MG/1
3 TABLET ORAL DAILY
Qty: 30 TABLET | Refills: 5 | Status: SHIPPED | OUTPATIENT
Start: 2021-10-07 | End: 2022-02-08

## 2021-10-07 NOTE — TELEPHONE ENCOUNTER
Patient resides in a group home.  His insurance prefers Linzess as Tr[facility] is not covered by his insurance.  Is it ok to change to Linzess and pharmacy is asking if it's ok if he starts the medication on Monday or Tuesday?  [facility]  [phone]  Sent in new rx for Linzess 145mcg 90 w 3 refills.  pk/arpit

## 2021-10-10 ENCOUNTER — APPOINTMENT (OUTPATIENT)
Dept: GENERAL RADIOLOGY | Facility: HOSPITAL | Age: 66
End: 2021-10-10

## 2021-10-10 ENCOUNTER — APPOINTMENT (OUTPATIENT)
Dept: CT IMAGING | Facility: HOSPITAL | Age: 66
End: 2021-10-10

## 2021-10-10 ENCOUNTER — HOSPITAL ENCOUNTER (INPATIENT)
Facility: HOSPITAL | Age: 66
LOS: 8 days | Discharge: NURSING, REHAB OR LONG-TERM CARE | End: 2021-10-19
Attending: EMERGENCY MEDICINE | Admitting: HOSPITALIST

## 2021-10-10 DIAGNOSIS — G80.9 CEREBRAL PALSY, UNSPECIFIED TYPE (HCC): ICD-10-CM

## 2021-10-10 DIAGNOSIS — S72.331A CLOSED DISPLACED OBLIQUE FRACTURE OF SHAFT OF RIGHT FEMUR, INITIAL ENCOUNTER (HCC): Primary | ICD-10-CM

## 2021-10-10 LAB
ALBUMIN SERPL-MCNC: 4.1 G/DL (ref 3.5–5.2)
ALBUMIN/GLOB SERPL: 1.5 G/DL
ALP SERPL-CCNC: 89 U/L (ref 39–117)
ALT SERPL W P-5'-P-CCNC: 17 U/L (ref 1–41)
ANION GAP SERPL CALCULATED.3IONS-SCNC: 7.8 MMOL/L (ref 5–15)
AST SERPL-CCNC: 18 U/L (ref 1–40)
BASOPHILS # BLD AUTO: 0.05 10*3/MM3 (ref 0–0.2)
BASOPHILS NFR BLD AUTO: 0.4 % (ref 0–1.5)
BILIRUB SERPL-MCNC: 0.2 MG/DL (ref 0–1.2)
BUN SERPL-MCNC: 5 MG/DL (ref 8–23)
BUN/CREAT SERPL: 6.4 (ref 7–25)
CALCIUM SPEC-SCNC: 8.8 MG/DL (ref 8.6–10.5)
CHLORIDE SERPL-SCNC: 103 MMOL/L (ref 98–107)
CO2 SERPL-SCNC: 29.2 MMOL/L (ref 22–29)
CREAT SERPL-MCNC: 0.78 MG/DL (ref 0.76–1.27)
DEPRECATED RDW RBC AUTO: 46.9 FL (ref 37–54)
EOSINOPHIL # BLD AUTO: 0.23 10*3/MM3 (ref 0–0.4)
EOSINOPHIL NFR BLD AUTO: 2 % (ref 0.3–6.2)
ERYTHROCYTE [DISTWIDTH] IN BLOOD BY AUTOMATED COUNT: 12.6 % (ref 12.3–15.4)
GFR SERPL CREATININE-BSD FRML MDRD: 100 ML/MIN/1.73
GLOBULIN UR ELPH-MCNC: 2.8 GM/DL
GLUCOSE SERPL-MCNC: 106 MG/DL (ref 65–99)
HCT VFR BLD AUTO: 42.2 % (ref 37.5–51)
HGB BLD-MCNC: 13.6 G/DL (ref 13–17.7)
IMM GRANULOCYTES # BLD AUTO: 0.03 10*3/MM3 (ref 0–0.05)
IMM GRANULOCYTES NFR BLD AUTO: 0.3 % (ref 0–0.5)
INR PPP: 1.01 (ref 0.9–1.1)
LYMPHOCYTES # BLD AUTO: 1.71 10*3/MM3 (ref 0.7–3.1)
LYMPHOCYTES NFR BLD AUTO: 15.2 % (ref 19.6–45.3)
MCH RBC QN AUTO: 32.1 PG (ref 26.6–33)
MCHC RBC AUTO-ENTMCNC: 32.2 G/DL (ref 31.5–35.7)
MCV RBC AUTO: 99.5 FL (ref 79–97)
MONOCYTES # BLD AUTO: 0.88 10*3/MM3 (ref 0.1–0.9)
MONOCYTES NFR BLD AUTO: 7.8 % (ref 5–12)
NEUTROPHILS NFR BLD AUTO: 74.3 % (ref 42.7–76)
NEUTROPHILS NFR BLD AUTO: 8.33 10*3/MM3 (ref 1.7–7)
NRBC BLD AUTO-RTO: 0 /100 WBC (ref 0–0.2)
PLATELET # BLD AUTO: 245 10*3/MM3 (ref 140–450)
PMV BLD AUTO: 10.2 FL (ref 6–12)
POTASSIUM SERPL-SCNC: 4.1 MMOL/L (ref 3.5–5.2)
PROT SERPL-MCNC: 6.9 G/DL (ref 6–8.5)
PROTHROMBIN TIME: 13.1 SECONDS (ref 11.7–14.2)
RBC # BLD AUTO: 4.24 10*6/MM3 (ref 4.14–5.8)
SODIUM SERPL-SCNC: 140 MMOL/L (ref 136–145)
WBC # BLD AUTO: 11.23 10*3/MM3 (ref 3.4–10.8)

## 2021-10-10 PROCEDURE — 74176 CT ABD & PELVIS W/O CONTRAST: CPT

## 2021-10-10 PROCEDURE — 84145 PROCALCITONIN (PCT): CPT | Performed by: HOSPITALIST

## 2021-10-10 PROCEDURE — U0005 INFEC AGEN DETEC AMPLI PROBE: HCPCS | Performed by: EMERGENCY MEDICINE

## 2021-10-10 PROCEDURE — 80053 COMPREHEN METABOLIC PANEL: CPT | Performed by: EMERGENCY MEDICINE

## 2021-10-10 PROCEDURE — 25010000002 FENTANYL CITRATE (PF) 50 MCG/ML SOLUTION: Performed by: EMERGENCY MEDICINE

## 2021-10-10 PROCEDURE — 99285 EMERGENCY DEPT VISIT HI MDM: CPT

## 2021-10-10 PROCEDURE — 73552 X-RAY EXAM OF FEMUR 2/>: CPT

## 2021-10-10 PROCEDURE — 85025 COMPLETE CBC W/AUTO DIFF WBC: CPT | Performed by: EMERGENCY MEDICINE

## 2021-10-10 PROCEDURE — 25010000002 ONDANSETRON PER 1 MG: Performed by: EMERGENCY MEDICINE

## 2021-10-10 PROCEDURE — 85610 PROTHROMBIN TIME: CPT | Performed by: EMERGENCY MEDICINE

## 2021-10-10 PROCEDURE — 71045 X-RAY EXAM CHEST 1 VIEW: CPT

## 2021-10-10 PROCEDURE — U0003 INFECTIOUS AGENT DETECTION BY NUCLEIC ACID (DNA OR RNA); SEVERE ACUTE RESPIRATORY SYNDROME CORONAVIRUS 2 (SARS-COV-2) (CORONAVIRUS DISEASE [COVID-19]), AMPLIFIED PROBE TECHNIQUE, MAKING USE OF HIGH THROUGHPUT TECHNOLOGIES AS DESCRIBED BY CMS-2020-01-R: HCPCS | Performed by: EMERGENCY MEDICINE

## 2021-10-10 PROCEDURE — 70450 CT HEAD/BRAIN W/O DYE: CPT

## 2021-10-10 PROCEDURE — 99284 EMERGENCY DEPT VISIT MOD MDM: CPT

## 2021-10-10 RX ORDER — SODIUM CHLORIDE 0.9 % (FLUSH) 0.9 %
10 SYRINGE (ML) INJECTION AS NEEDED
Status: DISCONTINUED | OUTPATIENT
Start: 2021-10-10 | End: 2021-10-20 | Stop reason: HOSPADM

## 2021-10-10 RX ORDER — FENTANYL CITRATE 50 UG/ML
50 INJECTION, SOLUTION INTRAMUSCULAR; INTRAVENOUS ONCE
Status: COMPLETED | OUTPATIENT
Start: 2021-10-10 | End: 2021-10-10

## 2021-10-10 RX ORDER — ONDANSETRON 2 MG/ML
4 INJECTION INTRAMUSCULAR; INTRAVENOUS ONCE
Status: COMPLETED | OUTPATIENT
Start: 2021-10-10 | End: 2021-10-10

## 2021-10-10 RX ADMIN — ONDANSETRON 4 MG: 2 INJECTION INTRAMUSCULAR; INTRAVENOUS at 22:21

## 2021-10-10 RX ADMIN — FENTANYL CITRATE 50 MCG: 50 INJECTION, SOLUTION INTRAMUSCULAR; INTRAVENOUS at 22:21

## 2021-10-11 ENCOUNTER — ANESTHESIA EVENT (OUTPATIENT)
Dept: PERIOP | Facility: HOSPITAL | Age: 66
End: 2021-10-11

## 2021-10-11 ENCOUNTER — APPOINTMENT (OUTPATIENT)
Dept: GENERAL RADIOLOGY | Facility: HOSPITAL | Age: 66
End: 2021-10-11

## 2021-10-11 ENCOUNTER — ANESTHESIA (OUTPATIENT)
Dept: PERIOP | Facility: HOSPITAL | Age: 66
End: 2021-10-11

## 2021-10-11 PROBLEM — K59.09 CHRONIC CONSTIPATION: Status: ACTIVE | Noted: 2021-10-11

## 2021-10-11 PROBLEM — S72.331A CLOSED DISPLACED OBLIQUE FRACTURE OF SHAFT OF RIGHT FEMUR (HCC): Status: ACTIVE | Noted: 2021-10-11

## 2021-10-11 PROBLEM — G40.909 SEIZURE DISORDER: Status: ACTIVE | Noted: 2021-10-11

## 2021-10-11 LAB
PROCALCITONIN SERPL-MCNC: 0.03 NG/ML (ref 0–0.25)
SARS-COV-2 RNA RESP QL NAA+PROBE: NOT DETECTED

## 2021-10-11 PROCEDURE — C1713 ANCHOR/SCREW BN/BN,TIS/BN: HCPCS | Performed by: ORTHOPAEDIC SURGERY

## 2021-10-11 PROCEDURE — 25010000002 FENTANYL CITRATE (PF) 50 MCG/ML SOLUTION: Performed by: NURSE ANESTHETIST, CERTIFIED REGISTERED

## 2021-10-11 PROCEDURE — 25010000003 LIDOCAINE 1 % SOLUTION 20 ML VIAL: Performed by: ORTHOPAEDIC SURGERY

## 2021-10-11 PROCEDURE — 76000 FLUOROSCOPY <1 HR PHYS/QHP: CPT | Performed by: ORTHOPAEDIC SURGERY

## 2021-10-11 PROCEDURE — 0QS806Z REPOSITION RIGHT FEMORAL SHAFT WITH INTRAMEDULLARY INTERNAL FIXATION DEVICE, OPEN APPROACH: ICD-10-PCS | Performed by: ORTHOPAEDIC SURGERY

## 2021-10-11 PROCEDURE — 25010000002 HYDROMORPHONE 1 MG/ML SOLUTION

## 2021-10-11 PROCEDURE — 25010000003 LEVETIRACETAM IN NACL 0.75% 1000 MG/100ML SOLUTION

## 2021-10-11 PROCEDURE — 25010000002 PROPOFOL 10 MG/ML EMULSION: Performed by: NURSE ANESTHETIST, CERTIFIED REGISTERED

## 2021-10-11 PROCEDURE — 25010000002 PHENYLEPHRINE 10 MG/ML SOLUTION: Performed by: ANESTHESIOLOGY

## 2021-10-11 PROCEDURE — 73502 X-RAY EXAM HIP UNI 2-3 VIEWS: CPT

## 2021-10-11 PROCEDURE — C1769 GUIDE WIRE: HCPCS | Performed by: ORTHOPAEDIC SURGERY

## 2021-10-11 PROCEDURE — 25010000002 SUCCINYLCHOLINE PER 20 MG: Performed by: NURSE ANESTHETIST, CERTIFIED REGISTERED

## 2021-10-11 PROCEDURE — 25010000002 DEXAMETHASONE PER 1 MG: Performed by: NURSE ANESTHETIST, CERTIFIED REGISTERED

## 2021-10-11 PROCEDURE — 25010000003 CEFAZOLIN IN DEXTROSE 2-4 GM/100ML-% SOLUTION: Performed by: ORTHOPAEDIC SURGERY

## 2021-10-11 PROCEDURE — 25010000002 CEFAZOLIN 1-4 GM/50ML-% SOLUTION: Performed by: NURSE ANESTHETIST, CERTIFIED REGISTERED

## 2021-10-11 PROCEDURE — 27506 TREATMENT OF THIGH FRACTURE: CPT | Performed by: ORTHOPAEDIC SURGERY

## 2021-10-11 PROCEDURE — 99222 1ST HOSP IP/OBS MODERATE 55: CPT | Performed by: ORTHOPAEDIC SURGERY

## 2021-10-11 DEVICE — LOCKING SCREW
Type: IMPLANTABLE DEVICE | Site: FEMUR | Status: FUNCTIONAL
Brand: T2 ALPHA

## 2021-10-11 DEVICE — FEMORAL NAIL PF, RIGHT
Type: IMPLANTABLE DEVICE | Site: FEMUR | Status: FUNCTIONAL
Brand: T2 ALPHA

## 2021-10-11 DEVICE — LAG SCREW, RECON
Type: IMPLANTABLE DEVICE | Site: FEMUR | Status: FUNCTIONAL
Brand: T2

## 2021-10-11 RX ORDER — POLYETHYLENE GLYCOL 3350 17 G/17G
17 POWDER, FOR SOLUTION ORAL DAILY
Status: DISCONTINUED | OUTPATIENT
Start: 2021-10-11 | End: 2021-10-20 | Stop reason: HOSPADM

## 2021-10-11 RX ORDER — SODIUM CHLORIDE, SODIUM LACTATE, POTASSIUM CHLORIDE, CALCIUM CHLORIDE 600; 310; 30; 20 MG/100ML; MG/100ML; MG/100ML; MG/100ML
75 INJECTION, SOLUTION INTRAVENOUS CONTINUOUS
Status: DISCONTINUED | OUTPATIENT
Start: 2021-10-12 | End: 2021-10-20 | Stop reason: HOSPADM

## 2021-10-11 RX ORDER — IPRATROPIUM BROMIDE AND ALBUTEROL SULFATE 2.5; .5 MG/3ML; MG/3ML
3 SOLUTION RESPIRATORY (INHALATION) 3 TIMES DAILY
Status: DISCONTINUED | OUTPATIENT
Start: 2021-10-11 | End: 2021-10-20 | Stop reason: HOSPADM

## 2021-10-11 RX ORDER — SODIUM CHLORIDE, SODIUM LACTATE, POTASSIUM CHLORIDE, CALCIUM CHLORIDE 600; 310; 30; 20 MG/100ML; MG/100ML; MG/100ML; MG/100ML
INJECTION, SOLUTION INTRAVENOUS CONTINUOUS PRN
Status: DISCONTINUED | OUTPATIENT
Start: 2021-10-11 | End: 2021-10-11 | Stop reason: SURG

## 2021-10-11 RX ORDER — DOCUSATE SODIUM 100 MG/1
100 CAPSULE, LIQUID FILLED ORAL 2 TIMES DAILY
Status: DISCONTINUED | OUTPATIENT
Start: 2021-10-12 | End: 2021-10-20 | Stop reason: HOSPADM

## 2021-10-11 RX ORDER — ASPIRIN 81 MG/1
81 TABLET ORAL DAILY
Status: DISCONTINUED | OUTPATIENT
Start: 2021-10-12 | End: 2021-10-12

## 2021-10-11 RX ORDER — PROPOFOL 10 MG/ML
VIAL (ML) INTRAVENOUS AS NEEDED
Status: DISCONTINUED | OUTPATIENT
Start: 2021-10-11 | End: 2021-10-11 | Stop reason: SURG

## 2021-10-11 RX ORDER — PAROXETINE 30 MG/1
30 TABLET, FILM COATED ORAL DAILY
Status: DISCONTINUED | OUTPATIENT
Start: 2021-10-12 | End: 2021-10-20 | Stop reason: HOSPADM

## 2021-10-11 RX ORDER — BISACODYL 10 MG
10 SUPPOSITORY, RECTAL RECTAL DAILY
Status: DISCONTINUED | OUTPATIENT
Start: 2021-10-11 | End: 2021-10-20 | Stop reason: HOSPADM

## 2021-10-11 RX ORDER — HYDROMORPHONE HYDROCHLORIDE 1 MG/ML
0.5 INJECTION, SOLUTION INTRAMUSCULAR; INTRAVENOUS; SUBCUTANEOUS
Status: DISCONTINUED | OUTPATIENT
Start: 2021-10-11 | End: 2021-10-11 | Stop reason: HOSPADM

## 2021-10-11 RX ORDER — FLUMAZENIL 0.1 MG/ML
0.2 INJECTION INTRAVENOUS AS NEEDED
Status: DISCONTINUED | OUTPATIENT
Start: 2021-10-11 | End: 2021-10-11 | Stop reason: HOSPADM

## 2021-10-11 RX ORDER — PROMETHAZINE HYDROCHLORIDE 25 MG/1
25 TABLET ORAL ONCE AS NEEDED
Status: DISCONTINUED | OUTPATIENT
Start: 2021-10-11 | End: 2021-10-11 | Stop reason: HOSPADM

## 2021-10-11 RX ORDER — CEFAZOLIN SODIUM 1 G/50ML
INJECTION, SOLUTION INTRAVENOUS AS NEEDED
Status: DISCONTINUED | OUTPATIENT
Start: 2021-10-11 | End: 2021-10-11 | Stop reason: SURG

## 2021-10-11 RX ORDER — ISOSORBIDE DINITRATE 20 MG/1
20 TABLET ORAL EVERY 12 HOURS
Status: DISCONTINUED | OUTPATIENT
Start: 2021-10-12 | End: 2021-10-20 | Stop reason: HOSPADM

## 2021-10-11 RX ORDER — NALOXONE HCL 0.4 MG/ML
0.4 VIAL (ML) INJECTION
Status: DISCONTINUED | OUTPATIENT
Start: 2021-10-11 | End: 2021-10-20 | Stop reason: HOSPADM

## 2021-10-11 RX ORDER — FLUTICASONE PROPIONATE 50 MCG
2 SPRAY, SUSPENSION (ML) NASAL DAILY
Status: CANCELLED | OUTPATIENT
Start: 2021-10-11

## 2021-10-11 RX ORDER — PROMETHAZINE HYDROCHLORIDE 25 MG/1
25 SUPPOSITORY RECTAL ONCE AS NEEDED
Status: DISCONTINUED | OUTPATIENT
Start: 2021-10-11 | End: 2021-10-11 | Stop reason: HOSPADM

## 2021-10-11 RX ORDER — HYDROCODONE BITARTRATE AND ACETAMINOPHEN 7.5; 325 MG/1; MG/1
1 TABLET ORAL EVERY 4 HOURS PRN
Status: DISCONTINUED | OUTPATIENT
Start: 2021-10-11 | End: 2021-10-20 | Stop reason: HOSPADM

## 2021-10-11 RX ORDER — ACETAMINOPHEN 650 MG/1
650 SUPPOSITORY RECTAL EVERY 4 HOURS PRN
Status: DISCONTINUED | OUTPATIENT
Start: 2021-10-11 | End: 2021-10-20 | Stop reason: HOSPADM

## 2021-10-11 RX ORDER — ALBUTEROL SULFATE 2.5 MG/3ML
2.5 SOLUTION RESPIRATORY (INHALATION) EVERY 4 HOURS PRN
Status: DISCONTINUED | OUTPATIENT
Start: 2021-10-11 | End: 2021-10-20 | Stop reason: HOSPADM

## 2021-10-11 RX ORDER — CEFAZOLIN SODIUM 2 G/100ML
2 INJECTION, SOLUTION INTRAVENOUS ONCE
Status: COMPLETED | OUTPATIENT
Start: 2021-10-11 | End: 2021-10-11

## 2021-10-11 RX ORDER — HYDRALAZINE HYDROCHLORIDE 20 MG/ML
5 INJECTION INTRAMUSCULAR; INTRAVENOUS
Status: DISCONTINUED | OUTPATIENT
Start: 2021-10-11 | End: 2021-10-11 | Stop reason: HOSPADM

## 2021-10-11 RX ORDER — SODIUM CHLORIDE 9 MG/ML
75 INJECTION, SOLUTION INTRAVENOUS CONTINUOUS
Status: DISCONTINUED | OUTPATIENT
Start: 2021-10-11 | End: 2021-10-12

## 2021-10-11 RX ORDER — UREA 10 %
3 LOTION (ML) TOPICAL NIGHTLY PRN
Status: DISCONTINUED | OUTPATIENT
Start: 2021-10-11 | End: 2021-10-20 | Stop reason: HOSPADM

## 2021-10-11 RX ORDER — TRAZODONE HYDROCHLORIDE 50 MG/1
50 TABLET ORAL NIGHTLY
Status: DISCONTINUED | OUTPATIENT
Start: 2021-10-12 | End: 2021-10-20 | Stop reason: HOSPADM

## 2021-10-11 RX ORDER — LAMOTRIGINE 100 MG/1
100 TABLET ORAL 2 TIMES DAILY
Status: DISCONTINUED | OUTPATIENT
Start: 2021-10-11 | End: 2021-10-20 | Stop reason: HOSPADM

## 2021-10-11 RX ORDER — ONDANSETRON 4 MG/1
4 TABLET, FILM COATED ORAL EVERY 6 HOURS PRN
Status: DISCONTINUED | OUTPATIENT
Start: 2021-10-11 | End: 2021-10-20 | Stop reason: HOSPADM

## 2021-10-11 RX ORDER — EPHEDRINE SULFATE 50 MG/ML
INJECTION, SOLUTION INTRAVENOUS AS NEEDED
Status: DISCONTINUED | OUTPATIENT
Start: 2021-10-11 | End: 2021-10-11 | Stop reason: SURG

## 2021-10-11 RX ORDER — ACETAMINOPHEN 325 MG/1
650 TABLET ORAL EVERY 4 HOURS PRN
Status: DISCONTINUED | OUTPATIENT
Start: 2021-10-11 | End: 2021-10-20 | Stop reason: HOSPADM

## 2021-10-11 RX ORDER — LEVOTHYROXINE SODIUM 0.12 MG/1
125 TABLET ORAL EVERY MORNING
Status: DISCONTINUED | OUTPATIENT
Start: 2021-10-12 | End: 2021-10-20 | Stop reason: HOSPADM

## 2021-10-11 RX ORDER — BISACODYL 10 MG
10 SUPPOSITORY, RECTAL RECTAL DAILY PRN
Status: DISCONTINUED | OUTPATIENT
Start: 2021-10-11 | End: 2021-10-20 | Stop reason: HOSPADM

## 2021-10-11 RX ORDER — ROCURONIUM BROMIDE 10 MG/ML
INJECTION, SOLUTION INTRAVENOUS AS NEEDED
Status: DISCONTINUED | OUTPATIENT
Start: 2021-10-11 | End: 2021-10-11 | Stop reason: SURG

## 2021-10-11 RX ORDER — SUCCINYLCHOLINE CHLORIDE 20 MG/ML
INJECTION INTRAMUSCULAR; INTRAVENOUS AS NEEDED
Status: DISCONTINUED | OUTPATIENT
Start: 2021-10-11 | End: 2021-10-11 | Stop reason: SURG

## 2021-10-11 RX ORDER — FENTANYL CITRATE 50 UG/ML
INJECTION, SOLUTION INTRAMUSCULAR; INTRAVENOUS AS NEEDED
Status: DISCONTINUED | OUTPATIENT
Start: 2021-10-11 | End: 2021-10-11 | Stop reason: SURG

## 2021-10-11 RX ORDER — LIDOCAINE HYDROCHLORIDE 40 MG/ML
SOLUTION TOPICAL AS NEEDED
Status: DISCONTINUED | OUTPATIENT
Start: 2021-10-11 | End: 2021-10-11 | Stop reason: SURG

## 2021-10-11 RX ORDER — LEVETIRACETAM 500 MG/1
1000 TABLET ORAL 2 TIMES DAILY
Status: DISCONTINUED | OUTPATIENT
Start: 2021-10-12 | End: 2021-10-19

## 2021-10-11 RX ORDER — SODIUM CHLORIDE, SODIUM LACTATE, POTASSIUM CHLORIDE, CALCIUM CHLORIDE 600; 310; 30; 20 MG/100ML; MG/100ML; MG/100ML; MG/100ML
9 INJECTION, SOLUTION INTRAVENOUS CONTINUOUS
Status: DISCONTINUED | OUTPATIENT
Start: 2021-10-11 | End: 2021-10-12

## 2021-10-11 RX ORDER — LIDOCAINE HYDROCHLORIDE 10 MG/ML
0.5 INJECTION, SOLUTION EPIDURAL; INFILTRATION; INTRACAUDAL; PERINEURAL ONCE AS NEEDED
Status: DISCONTINUED | OUTPATIENT
Start: 2021-10-11 | End: 2021-10-11 | Stop reason: HOSPADM

## 2021-10-11 RX ORDER — CEFAZOLIN SODIUM 2 G/100ML
2 INJECTION, SOLUTION INTRAVENOUS EVERY 8 HOURS
Status: COMPLETED | OUTPATIENT
Start: 2021-10-12 | End: 2021-10-12

## 2021-10-11 RX ORDER — DIPHENHYDRAMINE HYDROCHLORIDE 50 MG/ML
12.5 INJECTION INTRAMUSCULAR; INTRAVENOUS
Status: DISCONTINUED | OUTPATIENT
Start: 2021-10-11 | End: 2021-10-11 | Stop reason: HOSPADM

## 2021-10-11 RX ORDER — ONDANSETRON 2 MG/ML
4 INJECTION INTRAMUSCULAR; INTRAVENOUS EVERY 6 HOURS PRN
Status: DISCONTINUED | OUTPATIENT
Start: 2021-10-11 | End: 2021-10-20 | Stop reason: HOSPADM

## 2021-10-11 RX ORDER — BACLOFEN 10 MG/1
10 TABLET ORAL 2 TIMES DAILY
Status: DISCONTINUED | OUTPATIENT
Start: 2021-10-11 | End: 2021-10-20 | Stop reason: HOSPADM

## 2021-10-11 RX ORDER — FENTANYL CITRATE 50 UG/ML
50 INJECTION, SOLUTION INTRAMUSCULAR; INTRAVENOUS
Status: DISCONTINUED | OUTPATIENT
Start: 2021-10-11 | End: 2021-10-11 | Stop reason: HOSPADM

## 2021-10-11 RX ORDER — HYDROCODONE BITARTRATE AND ACETAMINOPHEN 7.5; 325 MG/1; MG/1
1 TABLET ORAL ONCE AS NEEDED
Status: DISCONTINUED | OUTPATIENT
Start: 2021-10-11 | End: 2021-10-11 | Stop reason: HOSPADM

## 2021-10-11 RX ORDER — PHENYLEPHRINE HYDROCHLORIDE 10 MG/ML
INJECTION INTRAVENOUS AS NEEDED
Status: DISCONTINUED | OUTPATIENT
Start: 2021-10-11 | End: 2021-10-11 | Stop reason: SURG

## 2021-10-11 RX ORDER — LEVETIRACETAM 10 MG/ML
1000 INJECTION INTRAVASCULAR EVERY 12 HOURS SCHEDULED
Status: DISCONTINUED | OUTPATIENT
Start: 2021-10-11 | End: 2021-10-11

## 2021-10-11 RX ORDER — MELATONIN
1000 2 TIMES DAILY
Status: DISCONTINUED | OUTPATIENT
Start: 2021-10-11 | End: 2021-10-20 | Stop reason: HOSPADM

## 2021-10-11 RX ORDER — DANTROLENE SODIUM 25 MG/1
25 CAPSULE ORAL DAILY
Status: DISCONTINUED | OUTPATIENT
Start: 2021-10-12 | End: 2021-10-20 | Stop reason: HOSPADM

## 2021-10-11 RX ORDER — SODIUM CHLORIDE 0.9 % (FLUSH) 0.9 %
3-10 SYRINGE (ML) INJECTION AS NEEDED
Status: DISCONTINUED | OUTPATIENT
Start: 2021-10-11 | End: 2021-10-11 | Stop reason: HOSPADM

## 2021-10-11 RX ORDER — LIDOCAINE HYDROCHLORIDE 20 MG/ML
INJECTION, SOLUTION INFILTRATION; PERINEURAL AS NEEDED
Status: DISCONTINUED | OUTPATIENT
Start: 2021-10-11 | End: 2021-10-11 | Stop reason: SURG

## 2021-10-11 RX ORDER — SENNA PLUS 8.6 MG/1
3 TABLET ORAL 2 TIMES DAILY
Status: DISCONTINUED | OUTPATIENT
Start: 2021-10-12 | End: 2021-10-20 | Stop reason: HOSPADM

## 2021-10-11 RX ORDER — FLUTICASONE PROPIONATE 50 MCG
1 SPRAY, SUSPENSION (ML) NASAL DAILY
Status: DISCONTINUED | OUTPATIENT
Start: 2021-10-12 | End: 2021-10-20 | Stop reason: HOSPADM

## 2021-10-11 RX ORDER — PANTOPRAZOLE SODIUM 40 MG/1
40 TABLET, DELAYED RELEASE ORAL EVERY MORNING
Status: DISCONTINUED | OUTPATIENT
Start: 2021-10-12 | End: 2021-10-20 | Stop reason: HOSPADM

## 2021-10-11 RX ORDER — SODIUM CHLORIDE 0.9 % (FLUSH) 0.9 %
3 SYRINGE (ML) INJECTION EVERY 12 HOURS SCHEDULED
Status: DISCONTINUED | OUTPATIENT
Start: 2021-10-12 | End: 2021-10-20 | Stop reason: HOSPADM

## 2021-10-11 RX ORDER — IBUPROFEN 600 MG/1
600 TABLET ORAL ONCE AS NEEDED
Status: DISCONTINUED | OUTPATIENT
Start: 2021-10-11 | End: 2021-10-11 | Stop reason: HOSPADM

## 2021-10-11 RX ORDER — SODIUM CHLORIDE 0.9 % (FLUSH) 0.9 %
10 SYRINGE (ML) INJECTION AS NEEDED
Status: DISCONTINUED | OUTPATIENT
Start: 2021-10-11 | End: 2021-10-20 | Stop reason: HOSPADM

## 2021-10-11 RX ORDER — NALOXONE HCL 0.4 MG/ML
0.2 VIAL (ML) INJECTION AS NEEDED
Status: DISCONTINUED | OUTPATIENT
Start: 2021-10-11 | End: 2021-10-11 | Stop reason: HOSPADM

## 2021-10-11 RX ORDER — LABETALOL HYDROCHLORIDE 5 MG/ML
5 INJECTION, SOLUTION INTRAVENOUS
Status: DISCONTINUED | OUTPATIENT
Start: 2021-10-11 | End: 2021-10-11 | Stop reason: HOSPADM

## 2021-10-11 RX ORDER — MAGNESIUM HYDROXIDE 1200 MG/15ML
LIQUID ORAL AS NEEDED
Status: DISCONTINUED | OUTPATIENT
Start: 2021-10-11 | End: 2021-10-11 | Stop reason: HOSPADM

## 2021-10-11 RX ORDER — DIPHENHYDRAMINE HCL 25 MG
25 CAPSULE ORAL
Status: DISCONTINUED | OUTPATIENT
Start: 2021-10-11 | End: 2021-10-11 | Stop reason: HOSPADM

## 2021-10-11 RX ORDER — MONTELUKAST SODIUM 10 MG/1
10 TABLET ORAL NIGHTLY
Status: DISCONTINUED | OUTPATIENT
Start: 2021-10-12 | End: 2021-10-20 | Stop reason: HOSPADM

## 2021-10-11 RX ORDER — HYDROCODONE BITARTRATE AND ACETAMINOPHEN 7.5; 325 MG/1; MG/1
2 TABLET ORAL EVERY 4 HOURS PRN
Status: DISCONTINUED | OUTPATIENT
Start: 2021-10-11 | End: 2021-10-20 | Stop reason: HOSPADM

## 2021-10-11 RX ORDER — EPHEDRINE SULFATE 50 MG/ML
5 INJECTION, SOLUTION INTRAVENOUS ONCE AS NEEDED
Status: DISCONTINUED | OUTPATIENT
Start: 2021-10-11 | End: 2021-10-11 | Stop reason: HOSPADM

## 2021-10-11 RX ORDER — ONDANSETRON 2 MG/ML
4 INJECTION INTRAMUSCULAR; INTRAVENOUS ONCE AS NEEDED
Status: DISCONTINUED | OUTPATIENT
Start: 2021-10-11 | End: 2021-10-11 | Stop reason: HOSPADM

## 2021-10-11 RX ORDER — OXYCODONE AND ACETAMINOPHEN 10; 325 MG/1; MG/1
1 TABLET ORAL EVERY 4 HOURS PRN
Status: DISCONTINUED | OUTPATIENT
Start: 2021-10-11 | End: 2021-10-11 | Stop reason: HOSPADM

## 2021-10-11 RX ORDER — DEXAMETHASONE SODIUM PHOSPHATE 4 MG/ML
INJECTION, SOLUTION INTRA-ARTICULAR; INTRALESIONAL; INTRAMUSCULAR; INTRAVENOUS; SOFT TISSUE AS NEEDED
Status: DISCONTINUED | OUTPATIENT
Start: 2021-10-11 | End: 2021-10-11 | Stop reason: SURG

## 2021-10-11 RX ORDER — SODIUM CHLORIDE 0.9 % (FLUSH) 0.9 %
3 SYRINGE (ML) INJECTION EVERY 12 HOURS SCHEDULED
Status: DISCONTINUED | OUTPATIENT
Start: 2021-10-11 | End: 2021-10-11 | Stop reason: HOSPADM

## 2021-10-11 RX ADMIN — LEVETIRACETAM 1000 MG: 1000 INJECTION, SOLUTION INTRAVENOUS at 04:45

## 2021-10-11 RX ADMIN — SODIUM CHLORIDE, POTASSIUM CHLORIDE, SODIUM LACTATE AND CALCIUM CHLORIDE 9 ML/HR: 600; 310; 30; 20 INJECTION, SOLUTION INTRAVENOUS at 16:35

## 2021-10-11 RX ADMIN — FENTANYL CITRATE 25 MCG: 0.05 INJECTION, SOLUTION INTRAMUSCULAR; INTRAVENOUS at 18:12

## 2021-10-11 RX ADMIN — POLYETHYLENE GLYCOL 3350 17 G: 17 POWDER, FOR SOLUTION ORAL at 23:34

## 2021-10-11 RX ADMIN — METOPROLOL TARTRATE 2.5 MG: 5 INJECTION, SOLUTION INTRAVENOUS at 04:41

## 2021-10-11 RX ADMIN — EPHEDRINE SULFATE 20 MG: 50 INJECTION INTRAVENOUS at 16:57

## 2021-10-11 RX ADMIN — ROCURONIUM BROMIDE 20 MG: 50 INJECTION INTRAVENOUS at 17:48

## 2021-10-11 RX ADMIN — LIDOCAINE HYDROCHLORIDE 1 EACH: 40 SOLUTION TOPICAL at 16:55

## 2021-10-11 RX ADMIN — SODIUM CHLORIDE 75 ML/HR: 9 INJECTION, SOLUTION INTRAVENOUS at 03:07

## 2021-10-11 RX ADMIN — HYDROMORPHONE HYDROCHLORIDE 1 MG: 1 INJECTION, SOLUTION INTRAMUSCULAR; INTRAVENOUS; SUBCUTANEOUS at 15:04

## 2021-10-11 RX ADMIN — ROCURONIUM BROMIDE 10 MG: 50 INJECTION INTRAVENOUS at 18:22

## 2021-10-11 RX ADMIN — SODIUM CHLORIDE, POTASSIUM CHLORIDE, SODIUM LACTATE AND CALCIUM CHLORIDE: 600; 310; 30; 20 INJECTION, SOLUTION INTRAVENOUS at 16:21

## 2021-10-11 RX ADMIN — SUGAMMADEX 200 MG: 100 INJECTION, SOLUTION INTRAVENOUS at 20:54

## 2021-10-11 RX ADMIN — ROCURONIUM BROMIDE 30 MG: 50 INJECTION INTRAVENOUS at 17:09

## 2021-10-11 RX ADMIN — SUCCINYLCHOLINE CHLORIDE 160 MG: 20 INJECTION, SOLUTION INTRAMUSCULAR; INTRAVENOUS; PARENTERAL at 16:53

## 2021-10-11 RX ADMIN — PROPOFOL 130 MG: 10 INJECTION, EMULSION INTRAVENOUS at 16:53

## 2021-10-11 RX ADMIN — ROCURONIUM BROMIDE 15 MG: 50 INJECTION INTRAVENOUS at 19:10

## 2021-10-11 RX ADMIN — DEXAMETHASONE SODIUM PHOSPHATE 8 MG: 4 INJECTION, SOLUTION INTRAMUSCULAR; INTRAVENOUS at 17:17

## 2021-10-11 RX ADMIN — SODIUM CHLORIDE, POTASSIUM CHLORIDE, SODIUM LACTATE AND CALCIUM CHLORIDE: 600; 310; 30; 20 INJECTION, SOLUTION INTRAVENOUS at 20:54

## 2021-10-11 RX ADMIN — ROCURONIUM BROMIDE 15 MG: 50 INJECTION INTRAVENOUS at 18:39

## 2021-10-11 RX ADMIN — METOPROLOL TARTRATE 25 MG: 25 TABLET, FILM COATED ORAL at 23:35

## 2021-10-11 RX ADMIN — ROCURONIUM BROMIDE 10 MG: 50 INJECTION INTRAVENOUS at 16:53

## 2021-10-11 RX ADMIN — SODIUM CHLORIDE, POTASSIUM CHLORIDE, SODIUM LACTATE AND CALCIUM CHLORIDE 100 ML/HR: 600; 310; 30; 20 INJECTION, SOLUTION INTRAVENOUS at 23:35

## 2021-10-11 RX ADMIN — FENTANYL CITRATE 50 MCG: 0.05 INJECTION, SOLUTION INTRAMUSCULAR; INTRAVENOUS at 17:28

## 2021-10-11 RX ADMIN — LIDOCAINE HYDROCHLORIDE 100 MG: 20 INJECTION, SOLUTION INFILTRATION; PERINEURAL at 16:53

## 2021-10-11 RX ADMIN — SODIUM CHLORIDE, POTASSIUM CHLORIDE, SODIUM LACTATE AND CALCIUM CHLORIDE: 600; 310; 30; 20 INJECTION, SOLUTION INTRAVENOUS at 18:30

## 2021-10-11 RX ADMIN — ROCURONIUM BROMIDE 20 MG: 50 INJECTION INTRAVENOUS at 17:30

## 2021-10-11 RX ADMIN — EPHEDRINE SULFATE 10 MG: 50 INJECTION INTRAVENOUS at 17:46

## 2021-10-11 RX ADMIN — CEFAZOLIN SODIUM 2 G: 2 INJECTION, SOLUTION INTRAVENOUS at 16:35

## 2021-10-11 RX ADMIN — ROCURONIUM BROMIDE 10 MG: 50 INJECTION INTRAVENOUS at 18:03

## 2021-10-11 RX ADMIN — HYDROMORPHONE HYDROCHLORIDE 1 MG: 1 INJECTION, SOLUTION INTRAMUSCULAR; INTRAVENOUS; SUBCUTANEOUS at 11:23

## 2021-10-11 RX ADMIN — FENTANYL CITRATE 25 MCG: 0.05 INJECTION, SOLUTION INTRAMUSCULAR; INTRAVENOUS at 18:09

## 2021-10-11 RX ADMIN — PHENYLEPHRINE HYDROCHLORIDE 100 MCG: 10 INJECTION, SOLUTION INTRAVENOUS at 20:28

## 2021-10-11 RX ADMIN — CEFAZOLIN SODIUM 2 G: 1 INJECTION, SOLUTION INTRAVENOUS at 17:25

## 2021-10-11 RX ADMIN — SODIUM CHLORIDE, PRESERVATIVE FREE 3 ML: 5 INJECTION INTRAVENOUS at 23:29

## 2021-10-11 NOTE — ANESTHESIA PROCEDURE NOTES
Airway  Urgency: elective    Date/Time: 10/11/2021 4:55 PM  Airway not difficult    General Information and Staff    Patient location during procedure: OR  Anesthesiologist: Mik Salguero MD  CRNA: Robb Thakur CRNA    Indications and Patient Condition  Indications for airway management: airway protection    Preoxygenated: yes  MILS not maintained throughout  Mask difficulty assessment: 1 - vent by mask    Final Airway Details  Final airway type: endotracheal airway      Successful airway: ETT  Cuffed: yes   Successful intubation technique: direct laryngoscopy  Endotracheal tube insertion site: oral  Blade: Argenis  Blade size: 3  ETT size (mm): 7.5  Cormack-Lehane Classification: grade I - full view of glottis  Placement verified by: chest auscultation and capnometry   Cuff volume (mL): 8  Measured from: lips  ETT/EBT  to lips (cm): 22  Number of attempts at approach: 1  Assessment: lips, teeth, and gum same as pre-op and atraumatic intubation    Additional Comments  Pre O2, SIAI

## 2021-10-11 NOTE — PLAN OF CARE
Goal Outcome Evaluation:  Plan of Care Reviewed With: patient        Progress: no change  Outcome Summary: Pt new admit from ER, pt had a witnessed fall by the caregiver at the [facility] while on cameron lift, R hip fracture per XRay result. Pt non-verbal. On O2 inhalation at 2L/min per nasal cannula. VSS. Contractures on both upper/lower extremities. Moans in pain. Skin looks fine. In-pt Pulmonology (infiltrates per CXR) and Orthopedic surgery (R femur fracture) consult called in. Bed rest. Primofit for voiding. IVF NS 75ml/hr infusing well on his L upper arm. Currently resting in bed. Will continue to monitor.

## 2021-10-11 NOTE — H&P
Patient Name:  Ned Basilio  YOB: 1955  MRN:  [id number]  Admit Date:  10/10/2021  Patient Care Team:  Seth Villanueva MD as PCP - General (Internal Medicine)  Cedric Kwok MD as Consulting Physician (Gastroenterology)      Subjective   History Present Illness     Chief Complaint   Patient presents with   • Fall       Mr. Basilio is a 65 y.o. male with a history of CP, HLD, thyroidism, chronic constipation, anxiety, osteoporosis seizures that presents to [facility] complaining of fall out of assistive lift at [facility] facility resulting in a right femur fracture.  She is nonverbal due to his cerebral palsy, but representative from [facility] is at bedside and was able to give synopsis this evening's events resulting in this patient's hospitalization.  Patient's medical decision maker is in [place] at this time.  Staff from [facility] is not at liberty to make any medical decisions for this patient.  [facility] staff is able to verify patient's name, date of birth and history.      History of Present Illness  Review of Systems   Unable to perform ROS: Patient nonverbal        Personal History     Past Medical History:   Diagnosis Date   • Anxiety    • Arthritis    • Sommers's esophagus    • Cardiomegaly    • Cerebral palsy (HCC)    • Constipation    • Disease of thyroid gland    • GERD (gastroesophageal reflux disease)    • Hyperlipidemia    • Osteoporosis    • Parkinson disease (HCC)    • Pre-operative cardiovascular examination    • Raynaud's disease    • Right hemiplegia (HCC)    • Seizures (HCC)      No past surgical history on file.  Family History   Problem Relation Age of Onset   • No Known Problems Mother    • No Known Problems Father      Social History     Tobacco Use   • Smoking status: Never Smoker   • Smokeless tobacco: Never Used   Vaping Use   • Vaping Use: Never used   Substance Use Topics   • Alcohol use: No   • Drug use: No     No current facility-administered medications  on file prior to encounter.     Current Outpatient Medications on File Prior to Encounter   Medication Sig Dispense Refill   • albuterol (PROVENTIL) (2.5 MG/3ML) 0.083% nebulizer solution Take 2.5 mg by nebulization Every 4 (Four) Hours As Needed for Wheezing.     • aspirin 81 MG chewable tablet Chew 81 mg Daily.     • baclofen (LIORESAL) 10 MG tablet Take 10 mg by mouth 2 (Two) Times a Day.     • bisacodyl (LAXATIVE) 10 MG suppository Insert 10 mg into the rectum Daily.     • Calcium Citrate-Vitamin D (CALCIUM CITRATE + D PO) Take 1 tablet by mouth Daily.     • chlorhexidine (PERIDEX) 0.12 % solution      • Cholecalciferol (VITAMIN D-3) 1000 units capsule Take 1,000 Units by mouth 2 (Two) Times a Day.     • dantrolene (DANTRIUM) 25 MG capsule Take 25 mg by mouth Daily.     • Desitin 13 % cream cream      • ezetimibe (ZETIA) 10 MG tablet Take 10 mg by mouth Daily.     • fluticasone (FLONASE) 50 MCG/ACT nasal spray      • isosorbide dinitrate (ISORDIL) 20 MG tablet Take 20 mg by mouth Every 12 (Twelve) Hours.     • lamoTRIgine (LaMICtal) 100 MG tablet Take 100 mg by mouth 2 (Two) Times a Day.     • levETIRAcetam (KEPPRA) 1000 MG tablet Take 1,000 mg by mouth 2 (Two) Times a Day.     • levothyroxine (SYNTHROID, LEVOTHROID) 125 MCG tablet Take 125 mcg by mouth Daily.     • metoprolol tartrate (LOPRESSOR) 25 MG tablet Take 25 mg by mouth 2 (Two) Times a Day.     • Mi-Acid Gas Relief 80 MG chewable tablet CHEW 2 TABLETS 2 (TWO) TIMES A DAY FOR 30 DAYS. 120 tablet 11   • miconazole (MICOTIN) 2 % powder Apply  topically to the appropriate area as directed 2 (Two) Times a Day. Apply powder to intertriginous rash two times daily     • montelukast (SINGULAIR) 10 MG tablet Take 10 mg by mouth Every Night.     • Multiple Vitamins-Iron (TAB-A-KATE/IRON PO) Take 1 tablet by mouth Daily.     • omeprazole (priLOSEC) 40 MG capsule Take 40 mg by mouth Every 12 (Twelve) Hours.     • PARoxetine (PAXIL) 30 MG tablet      • polyethylene  glycol 17 g packet      • senna (SENOKOT) 8.6 MG tablet tablet Take 3 tablets by mouth 2 (Two) Times a Day.     • traZODone (DESYREL) 100 MG tablet      • Vitamins/Minerals tablet tablet      • linaclotide (Linzess) 145 MCG capsule capsule Take 145 mcg by mouth Every Morning Before Breakfast. (Patient not taking: Reported on 10/10/2021)     • mometasone (NASONEX) 50 MCG/ACT nasal spray 2 sprays into the nostril(s) as directed by provider Daily. (Patient not taking: Reported on 10/10/2021)     • Plecanatide (Trulance) 3 MG tablet Take 1 tablet by mouth Daily. 30 tablet 5   • Plenvu 140 g reconstituted solution solution TAKE 19,600 G BY MOUTH TAKE AS DIRECTED. PLEASE FOLLOW PREP INSTRUCTIONS. 3 each 0     Allergies   Allergen Reactions   • Lovastatin Anaphylaxis     Unknown reaction; Facility did not provide information.        Objective    Objective     Vital Signs  Temp:  [97.7 °F (36.5 °C)] 97.7 °F (36.5 °C)  Heart Rate:  [68-74] 68  Resp:  [16] 16  BP: (141-163)/(86-89) 141/89  SpO2:  [90 %-95 %] 90 %  on  Flow (L/min):  [3] 3;   Device (Oxygen Therapy): nasal cannula  There is no height or weight on file to calculate BMI.    Physical Exam  Constitutional:       Appearance: He is obese.   HENT:      Head: Atraumatic.      Mouth/Throat:      Mouth: Mucous membranes are dry.   Eyes:      Extraocular Movements: Extraocular movements intact.      Conjunctiva/sclera: Conjunctivae normal.   Cardiovascular:      Rate and Rhythm: Regular rhythm. Tachycardia present.      Pulses: Normal pulses.      Heart sounds: No murmur heard.      Pulmonary:      Effort: Pulmonary effort is normal.   Abdominal:      General: Bowel sounds are decreased. There is distension.      Palpations: Abdomen is rigid.      Tenderness: There is no abdominal tenderness.   Genitourinary:     Penis: Normal.       Testes: Normal.   Musculoskeletal:         General: Swelling, tenderness, deformity and signs of injury present.      Cervical back:  Rigidity present. No tenderness.   Skin:     General: Skin is warm.      Capillary Refill: Capillary refill takes less than 2 seconds.   Neurological:      Mental Status: He is alert. Mental status is at baseline. He is disoriented.      Sensory: Sensory deficit present.      Motor: Weakness present.      Coordination: Coordination abnormal.      Deep Tendon Reflexes: Reflexes abnormal.   Psychiatric:         Attention and Perception: He is inattentive.         Mood and Affect: Affect is labile.         Speech: He is noncommunicative.         Results Review:  I reviewed the patient's new clinical results.  I reviewed the patient's new imaging results and agree with the interpretation.  I reviewed the patient's other test results and agree with the interpretation  I personally viewed and interpreted the patient's EKG/Telemetry data  Discussed with ED provider.    Lab Results (last 24 hours)     Procedure Component Value Units Date/Time    CBC & Differential [[id number]]  (Abnormal) Collected: 10/10/21 2218    Specimen: Blood Updated: 10/10/21 2242    Narrative:      The following orders were created for panel order CBC & Differential.  Procedure                               Abnormality         Status                     ---------                               -----------         ------                     CBC Auto Differential[[id number]]        Abnormal            Final result                 Please view results for these tests on the individual orders.    Comprehensive Metabolic Panel [[id number]]  (Abnormal) Collected: 10/10/21 2218    Specimen: Blood Updated: 10/10/21 2253     Glucose 106 mg/dL      BUN 5 mg/dL      Creatinine 0.78 mg/dL      Sodium 140 mmol/L      Potassium 4.1 mmol/L      Chloride 103 mmol/L      CO2 29.2 mmol/L      Calcium 8.8 mg/dL      Total Protein 6.9 g/dL      Albumin 4.10 g/dL      ALT (SGPT) 17 U/L      AST (SGOT) 18 U/L      Alkaline Phosphatase 89 U/L      Total Bilirubin 0.2 mg/dL       eGFR Non African Amer 100 mL/min/1.73      Globulin 2.8 gm/dL      A/G Ratio 1.5 g/dL      BUN/Creatinine Ratio 6.4     Anion Gap 7.8 mmol/L     Narrative:      GFR Normal >60  Chronic Kidney Disease <60  Kidney Failure <15      Protime-INR [[id number]]  (Normal) Collected: 10/10/21 2218    Specimen: Blood Updated: 10/10/21 2257     Protime 13.1 Seconds      INR 1.01    CBC Auto Differential [[id number]]  (Abnormal) Collected: 10/10/21 2218    Specimen: Blood Updated: 10/10/21 2242     WBC 11.23 10*3/mm3      RBC 4.24 10*6/mm3      Hemoglobin 13.6 g/dL      Hematocrit 42.2 %      MCV 99.5 fL      MCH 32.1 pg      MCHC 32.2 g/dL      RDW 12.6 %      RDW-SD 46.9 fl      MPV 10.2 fL      Platelets 245 10*3/mm3      Neutrophil % 74.3 %      Lymphocyte % 15.2 %      Monocyte % 7.8 %      Eosinophil % 2.0 %      Basophil % 0.4 %      Immature Grans % 0.3 %      Neutrophils, Absolute 8.33 10*3/mm3      Lymphocytes, Absolute 1.71 10*3/mm3      Monocytes, Absolute 0.88 10*3/mm3      Eosinophils, Absolute 0.23 10*3/mm3      Basophils, Absolute 0.05 10*3/mm3      Immature Grans, Absolute 0.03 10*3/mm3      nRBC 0.0 /100 WBC     COVID PRE-OP / PRE-PROCEDURE SCREENING ORDER (NO ISOLATION) - Swab, Nasopharynx [[id number]] Collected: 10/10/21 2326    Specimen: Swab from Nasopharynx Updated: 10/10/21 2331    Narrative:      The following orders were created for panel order COVID PRE-OP / PRE-PROCEDURE SCREENING ORDER (NO ISOLATION) - Swab, Nasopharynx.  Procedure                               Abnormality         Status                     ---------                               -----------         ------                     COVID-19,SHAWN PETTIT IN-HOUSE...[[id number]]                      In process                   Please view results for these tests on the individual orders.    COVID-19,SHAWN MOHRU IN-HOUSE CEPHEID/KATHRYN NP SWAB IN TRANSPORT MEDIA 8-12 HR TAT - Swab, Nasopharynx [[id number]] Collected: 10/10/21 6246    Specimen: Swab from  Nasopharynx Updated: 10/10/21 2331          Imaging Results (Last 24 Hours)     Procedure Component Value Units Date/Time    CT Abdomen Pelvis Without Contrast [[id number]] Collected: 10/10/21 2325     Updated: 10/10/21 2325    Narrative:        Patient: DILLON GUZMAN  Time Out: 23:25  Exam(s): CT ABDOMEN + PELVIS Without Contrast     EXAM:    CT Abdomen and Pelvis Without Intravenous Contrast    CLINICAL HISTORY:     Reason for exam: Fall from Oksana lift onto right hip and leg.    TECHNIQUE:    Axial computed tomography images of the abdomen and pelvis without   intravenous contrast.  CTDI is 27.9 mGy and DLP is 1633.5 mGy-cm.  This   CT exam was performed according to the principle of ALARA (As Low As   Reasonably Achievable) by using one or more of the following dose   reduction techniques: automated exposure control, adjustment of the mA   and or kV according to patient size, and or use of iterative   reconstruction technique.    COMPARISON:    09 28 2021.    FINDINGS:    Lung bases:  Bilateral lower lobe atelectasis infiltrates.     ABDOMEN:    Liver:  Unremarkable.    Gallbladder and bile ducts:  Unremarkable.  No calcified stones.    Pancreas:  Unremarkable.    Spleen:  Unremarkable.    Adrenals:  Unremarkable.    Kidneys and ureters:  No hydronephrosis or ureteral calculus.  Small   right nephrolithiasis.    Stomach and bowel:  Unremarkable.  No obstruction.     PELVIS:    Appendix:  No findings to suggest acute appendicitis.    Bladder:  Mildly thickened bladder which may be underdistention,   chronic, versus cystitis.    Reproductive:  Unremarkable as visualized.     ABDOMEN and PELVIS:    Intraperitoneal space:  No free air.    Bones joints:  Right proximal femoral diaphysis displaced angulated   fracture.  Scoliosis.    Soft tissues:  Unremarkable.    Vasculature:  Unremarkable.    Lymph nodes:  Unremarkable.    IMPRESSION:       1.  Right proximal femoral diaphysis displaced angulated fracture.  2.   Bilateral lower lobe atelectasis infiltrates.      Impression:          Electronically signed by Dawson Ann M.D. on 10-10-21 at 2325    XR Chest 1 View [[id number]] Collected: 10/10/21 2304     Updated: 10/10/21 2304    Narrative:        Patient: DILLON GUZMAN  Time Out: 23:04  Exam(s): FILM CXR 1 VIEW     EXAM:    XR Chest, 1 View    CLINICAL HISTORY:     Reason for exam: Fall from Oksana lift to right leg.    TECHNIQUE:    Frontal view of the chest.    COMPARISON:    03 04 2019.    FINDINGS:    Lungs:  Possible mild pulmonary vascular congestion.  No focal   consolidation.    Pleural space:  Unremarkable.  No pneumothorax.    Heart:  Cardiomegaly.    Mediastinum:  Widened mediastinum which may be projectional versus   dissection or other etiology.    Bones joints:  Unremarkable.    IMPRESSION:       1.  Possible mild pulmonary vascular congestion.  No focal consolidation.  2.  Widened mediastinum which may be projectional versus dissection or   other etiology.      Impression:          Electronically signed by Dawson Ann M.D. on 10-10-21 at 2304    XR Femur 2 View Right [[id number]] Collected: 10/10/21 2301     Updated: 10/10/21 2301    Narrative:        Patient: MEGAN DILLON  Time Out: 23:00  Exam(s): FILM RIGHT FEMUR     EXAM:    XR Right Femur, 2 Views    CLINICAL HISTORY:     Reason for exam: Fall from Oksana lift to right leg.    TECHNIQUE:    Frontal and lateral views of the right femur.    COMPARISON:    No relevant prior studies available.    FINDINGS:    Bones joints:  Proximal femoral diaphysis displaced angulated fracture.    No acute dislocation.    Soft tissues:  Unremarkable.    IMPRESSION:         Proximal femoral diaphysis displaced angulated fracture.      Impression:          Electronically signed by Dawson Ann M.D. on 10-10-21 at 2300    CT Head Without Contrast [[id number]] Collected: 10/10/21 2235     Updated: 10/10/21 2235    Narrative:        Patient: DILLON GUZMAN  Time Out: 22:34  Exam(s): CT  HEAD Without Contrast     EXAM:    CT Head Without Intravenous Contrast    CLINICAL HISTORY:     Reason for exam: Fall from wire left onto right leg.    TECHNIQUE:    Axial computed tomography images of the head brain without intravenous   contrast.  CTDI is 54.8 mGy and DLP is 1138.8 mGy-cm.  This CT exam was   performed according to the principle of ALARA (As Low As Reasonably   Achievable) by using one or more of the following dose reduction   techniques: automated exposure control, adjustment of the mA and or kV   according to patient size, and or use of iterative reconstruction   technique.    COMPARISON:    No relevant prior studies available.    FINDINGS:    Limitations:  Limited due to motion.    Brain:  No acute intracranial hemorrhage or cortical ischemia.  Chronic   small vessel ischemic changes.    Ventricles:  Unremarkable.    Bones joints:  Unremarkable.  No acute fracture.    Soft tissues:  Unremarkable.    Sinuses:  Paranasal sinus mucosal thickening.    Mastoid air cells:  Unremarkable as visualized.    IMPRESSION:         No acute intracranial hemorrhage or skull fracture.      Impression:          Electronically signed by Dawson Ann M.D. on 10-10-21 at 2234            No orders to display     Assessment/Plan   Assessment/Plan   Active Hospital Problems    Diagnosis  POA   • **Closed displaced oblique fracture of shaft of right femur (HCC) [S72.331A]  Yes   • Chronic constipation [K59.09]  Yes   • Seizure disorder (HCC) [G40.909]  Yes   • Cerebral palsy (HCC) [G80.9]  Yes   • Parkinson disease (HCC) [G20]  Yes   • Osteoporosis [M81.0]  Yes   • Hypoxia [R09.02]  Yes   • Coronary artery disease [I25.10]  Yes      Resolved Hospital Problems   No resolved problems to display.       65 y.o. male admitted with Closed displaced oblique fracture of shaft of right femur (HCC).    Right femur fracture  -Orthopedic consult Re: Right femur fracture  -Pulmonary consult due to patient acquiring supplemental  oxygen all the time and infiltrates noted on chest x-ray  -IV Dilaudid for pain control  -Utilize nonverbal pain scale  -Patient n.p.o  -Initiate IVF  -Continues pulse oximetry while receiving narcotic pain medicine  -Neurovascular checks of affected extremity    -Dulcolax suppository as needed for constipation.  Will consider twice daily daily stool softener while receiving narcotics for pain management and once able to take p.o.  -CBC in a.m.; will monitor for leukocytosis  -BMP in a.m.; will monitor electrolytes while patient n.p.o. and receiving IVF  -We will change her metoprolol to IV while n.p.o.  -We will change from Keppra to IV while n.p.o  -We will resume other home p.o. meds once able to take p.o.    · SCDs for DVT prophylaxis.  · Full code.  · Discussed with [facility] management staff.      BUTCH Virk  [facility]  10/11/21  00:54 EDT

## 2021-10-11 NOTE — ED PROVIDER NOTES
EMERGENCY DEPARTMENT ENCOUNTER    Room Number:  P877/1  Date of encounter:  10/11/2021  PCP: Seth Villanueva MD  Historian: History obtained from triage nurse.  No family or friend present in the emergency department      HPI:  Chief Complaint: Fall from Oksana lift into right leg at nursing facility when being transferred  A complete HPI/ROS/PMH/PSH/SH/FH are unobtainable due to: Patient is nonverbal at baseline  Context: Ned Basilio is a 65 y.o. male who presents to the ED c/o patient was being transferred with a Oksana lift and fell and landed on his right leg.  Reports that it was his right hip.  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  There was no other report of injury per the nursing staff.  His neurologic status is at baseline per nursing staff.        Previous Episodes: Unaware  Current Symptoms: See above    MEDICAL HISTORY REVIEWED  Profound chronic neurologic deficit nonverbal and is unable to stand or walk and has limited movement to his extremities at baseline.  I reviewed the patient's medicine list.      PAST MEDICAL HISTORY  Active Ambulatory Problems     Diagnosis Date Noted   • History of colon polyps 01/20/2021   • Colon cancer screening 01/20/2021   • Complicated UTI (urinary tract infection) 04/16/2013   • Coronary artery disease 04/16/2013   • Fever 04/18/2013   • HCAP (healthcare-associated pneumonia) 04/16/2013   • HLD (hyperlipidemia) 04/16/2013   • Hypothyroidism 08/23/2021   • Leukocytosis 04/16/2013   • Septic shock (HCC) 04/16/2013   • Tracheal compression 04/17/2013   • DVT prophylaxis 04/17/2013     Resolved Ambulatory Problems     Diagnosis Date Noted   • No Resolved Ambulatory Problems     Past Medical History:   Diagnosis Date   • Anxiety    • Arthritis    • Sommers's esophagus    • Cardiomegaly    • Cerebral palsy (HCC)    • Constipation    • Disease of thyroid gland    • GERD (gastroesophageal  reflux disease)    • Hyperlipidemia    • Osteoporosis    • Parkinson disease (HCC)    • Pre-operative cardiovascular examination    • Raynaud's disease    • Right hemiplegia (HCC)    • Seizures (HCC)          PAST SURGICAL HISTORY  No past surgical history on file.      FAMILY HISTORY  Family History   Problem Relation Age of Onset   • No Known Problems Mother    • No Known Problems Father          SOCIAL HISTORY  Social History     Socioeconomic History   • Marital status: Single   Tobacco Use   • Smoking status: Never Smoker   • Smokeless tobacco: Never Used   Vaping Use   • Vaping Use: Never used   Substance and Sexual Activity   • Alcohol use: No   • Drug use: No         ALLERGIES  Lovastatin        REVIEW OF SYSTEMS  Review of Systems     All systems reviewed and negative except for those discussed in HPI.       PHYSICAL EXAM    I have reviewed the triage vital signs and nursing notes.    ED Triage Vitals [10/10/21 2018]   Temp Heart Rate Resp BP SpO2   97.7 °F (36.5 °C) 74 16 163/86 95 %      Temp src Heart Rate Source Patient Position BP Location FiO2 (%)   -- -- -- -- --       GENERAL: Elderly male no acute distress.Vital signs on my initial evaluation O2 sat is 97% on room air.  Blood pressure and heart rate is normal.  HENT: nares patent  Head/neck/ face are symmetric without gross deformity, signs of trauma, or swelling  EYES: no scleral icterus, no conjunctival pallor.  NECK: Supple, no meningismus.  No obvious signs of tenderness on palpation and normal inspection.  CV: regular rhythm, regular rate with intact distal pulses.  RESPIRATORY: normal effort and no respiratory distress.  ABDOMEN: soft and nontender.  Obese  MUSCULOSKELETAL: Patient appears to have tenderness to the right hip and proximal femur with palpation and movement.  Might have a little tenderness to the left hip as well with palpation and movement but not to the same extent as the right.  He has diffuse muscle wasting and contractures.   Both of his lower extremities are externally rotated.  His right lower extremity appears little shortened compared to his left.  He has no cyanosis, coolness to palpation or pallor.  He has intact distal pulses to his upper and lower extremities that are equal strong and symmetric bilaterally.  NEURO: alert and follows me with his eyes.  He is nonverbal.  Appears to moan and appears to be in pain with movement of that right hip and upper leg.  No obvious cranial nerve deficit.  Very limited movement to all of his extremities at baseline.  SKIN: warm, dry    Vital signs and nursing notes reviewed.        LAB RESULTS  Recent Results (from the past 24 hour(s))   Comprehensive Metabolic Panel    Collection Time: 10/10/21 10:18 PM    Specimen: Blood   Result Value Ref Range    Glucose 106 (H) 65 - 99 mg/dL    BUN 5 (L) 8 - 23 mg/dL    Creatinine 0.78 0.76 - 1.27 mg/dL    Sodium 140 136 - 145 mmol/L    Potassium 4.1 3.5 - 5.2 mmol/L    Chloride 103 98 - 107 mmol/L    CO2 29.2 (H) 22.0 - 29.0 mmol/L    Calcium 8.8 8.6 - 10.5 mg/dL    Total Protein 6.9 6.0 - 8.5 g/dL    Albumin 4.10 3.50 - 5.20 g/dL    ALT (SGPT) 17 1 - 41 U/L    AST (SGOT) 18 1 - 40 U/L    Alkaline Phosphatase 89 39 - 117 U/L    Total Bilirubin 0.2 0.0 - 1.2 mg/dL    eGFR Non African Amer 100 >60 mL/min/1.73    Globulin 2.8 gm/dL    A/G Ratio 1.5 g/dL    BUN/Creatinine Ratio 6.4 (L) 7.0 - 25.0    Anion Gap 7.8 5.0 - 15.0 mmol/L   Protime-INR    Collection Time: 10/10/21 10:18 PM    Specimen: Blood   Result Value Ref Range    Protime 13.1 11.7 - 14.2 Seconds    INR 1.01 0.90 - 1.10   CBC Auto Differential    Collection Time: 10/10/21 10:18 PM    Specimen: Blood   Result Value Ref Range    WBC 11.23 (H) 3.40 - 10.80 10*3/mm3    RBC 4.24 4.14 - 5.80 10*6/mm3    Hemoglobin 13.6 13.0 - 17.7 g/dL    Hematocrit 42.2 37.5 - 51.0 %    MCV 99.5 (H) 79.0 - 97.0 fL    MCH 32.1 26.6 - 33.0 pg    MCHC 32.2 31.5 - 35.7 g/dL    RDW 12.6 12.3 - 15.4 %    RDW-SD 46.9 37.0  - 54.0 fl    MPV 10.2 6.0 - 12.0 fL    Platelets 245 140 - 450 10*3/mm3    Neutrophil % 74.3 42.7 - 76.0 %    Lymphocyte % 15.2 (L) 19.6 - 45.3 %    Monocyte % 7.8 5.0 - 12.0 %    Eosinophil % 2.0 0.3 - 6.2 %    Basophil % 0.4 0.0 - 1.5 %    Immature Grans % 0.3 0.0 - 0.5 %    Neutrophils, Absolute 8.33 (H) 1.70 - 7.00 10*3/mm3    Lymphocytes, Absolute 1.71 0.70 - 3.10 10*3/mm3    Monocytes, Absolute 0.88 0.10 - 0.90 10*3/mm3    Eosinophils, Absolute 0.23 0.00 - 0.40 10*3/mm3    Basophils, Absolute 0.05 0.00 - 0.20 10*3/mm3    Immature Grans, Absolute 0.03 0.00 - 0.05 10*3/mm3    nRBC 0.0 0.0 - 0.2 /100 WBC   Procalcitonin    Collection Time: 10/10/21 10:18 PM    Specimen: Blood   Result Value Ref Range    Procalcitonin 0.03 0.00 - 0.25 ng/mL       Ordered the above labs and independently reviewed the results.        RADIOLOGY  CT Abdomen Pelvis Without Contrast    Result Date: 10/10/2021  Patient: DILLON GUZMAN  Time Out: 23:25 Exam(s): CT ABDOMEN + PELVIS Without Contrast EXAM:   CT Abdomen and Pelvis Without Intravenous Contrast CLINICAL HISTORY:    Reason for exam: Fall from Oksana lift onto right hip and leg. TECHNIQUE:   Axial computed tomography images of the abdomen and pelvis without intravenous contrast.  CTDI is 27.9 mGy and DLP is 1633.5 mGy-cm.  This CT exam was performed according to the principle of ALARA (As Low As Reasonably Achievable) by using one or more of the following dose reduction techniques: automated exposure control, adjustment of the mA and or kV according to patient size, and or use of iterative reconstruction technique. COMPARISON:   09 28 2021. FINDINGS:   Lung bases:  Bilateral lower lobe atelectasis infiltrates.  ABDOMEN:   Liver:  Unremarkable.   Gallbladder and bile ducts:  Unremarkable.  No calcified stones.   Pancreas:  Unremarkable.   Spleen:  Unremarkable.   Adrenals:  Unremarkable.   Kidneys and ureters:  No hydronephrosis or ureteral calculus.  Small right nephrolithiasis.    Stomach and bowel:  Unremarkable.  No obstruction.  PELVIS:   Appendix:  No findings to suggest acute appendicitis.   Bladder:  Mildly thickened bladder which may be underdistention, chronic, versus cystitis.   Reproductive:  Unremarkable as visualized.  ABDOMEN and PELVIS:   Intraperitoneal space:  No free air.   Bones joints:  Right proximal femoral diaphysis displaced angulated fracture.  Scoliosis.   Soft tissues:  Unremarkable.   Vasculature:  Unremarkable.   Lymph nodes:  Unremarkable. IMPRESSION:     1.  Right proximal femoral diaphysis displaced angulated fracture. 2.  Bilateral lower lobe atelectasis infiltrates.     Electronically signed by Dawson Ann M.D. on 10-10-21 at 2325    XR Femur 2 View Right    Result Date: 10/10/2021  Patient: DILLON GUZMAN  Time Out: 23:00 Exam(s): FILM RIGHT FEMUR EXAM:   XR Right Femur, 2 Views CLINICAL HISTORY:    Reason for exam: Fall from Oksana lift to right leg. TECHNIQUE:   Frontal and lateral views of the right femur. COMPARISON:   No relevant prior studies available. FINDINGS:   Bones joints:  Proximal femoral diaphysis displaced angulated fracture.  No acute dislocation.   Soft tissues:  Unremarkable. IMPRESSION:       Proximal femoral diaphysis displaced angulated fracture.     Electronically signed by Dawson Ann M.D. on 10-10-21 at 2300    CT Head Without Contrast    Result Date: 10/10/2021  Patient: DILLON GUZMAN  Time Out: 22:34 Exam(s): CT HEAD Without Contrast EXAM:   CT Head Without Intravenous Contrast CLINICAL HISTORY:    Reason for exam: Fall from wire left onto right leg. TECHNIQUE:   Axial computed tomography images of the head brain without intravenous contrast.  CTDI is 54.8 mGy and DLP is 1138.8 mGy-cm.  This CT exam was performed according to the principle of ALARA (As Low As Reasonably Achievable) by using one or more of the following dose reduction techniques: automated exposure control, adjustment of the mA and or kV according to patient size, and or  use of iterative reconstruction technique. COMPARISON:   No relevant prior studies available. FINDINGS:   Limitations:  Limited due to motion.   Brain:  No acute intracranial hemorrhage or cortical ischemia.  Chronic small vessel ischemic changes.   Ventricles:  Unremarkable.   Bones joints:  Unremarkable.  No acute fracture.   Soft tissues:  Unremarkable.   Sinuses:  Paranasal sinus mucosal thickening.   Mastoid air cells:  Unremarkable as visualized. IMPRESSION:       No acute intracranial hemorrhage or skull fracture.     Electronically signed by Dawson Ann M.D. on 10-10-21 at 2234    XR Chest 1 View    Result Date: 10/10/2021  Patient: DILLON GUZMAN  Time Out: 23:04 Exam(s): FILM CXR 1 VIEW EXAM:   XR Chest, 1 View CLINICAL HISTORY:    Reason for exam: Fall from Oksana lift to right leg. TECHNIQUE:   Frontal view of the chest. COMPARISON:   03 04 2019. FINDINGS:   Lungs:  Possible mild pulmonary vascular congestion.  No focal consolidation.   Pleural space:  Unremarkable.  No pneumothorax.   Heart:  Cardiomegaly.   Mediastinum:  Widened mediastinum which may be projectional versus dissection or other etiology.   Bones joints:  Unremarkable. IMPRESSION:     1.  Possible mild pulmonary vascular congestion.  No focal consolidation. 2.  Widened mediastinum which may be projectional versus dissection or other etiology.     Electronically signed by Dawson Ann M.D. on 10-10-21 at 2304      I ordered the above noted radiological studies. Reviewed by me and discussed with radiologist.  See dictation for official radiology interpretation.      PROCEDURES    Procedures      MEDICATIONS GIVEN IN ER    Medications   sodium chloride 0.9 % flush 10 mL (has no administration in time range)   sodium chloride 0.9 % flush 10 mL (has no administration in time range)   sodium chloride 0.9 % infusion (has no administration in time range)   HYDROmorphone (DILAUDID) injection 1 mg (has no administration in time range)     And    naloxone (NARCAN) injection 0.4 mg (has no administration in time range)   melatonin tablet 3 mg (has no administration in time range)   acetaminophen (TYLENOL) suppository 650 mg (has no administration in time range)   ondansetron (ZOFRAN) injection 4 mg (has no administration in time range)   bisacodyl (DULCOLAX) suppository 10 mg (has no administration in time range)   levETIRAcetam in NaCl 0.75% (KEPPRA) IVPB 1,000 mg (has no administration in time range)   metoprolol tartrate (LOPRESSOR) injection 2.5 mg (has no administration in time range)   fentaNYL citrate (PF) (SUBLIMAZE) injection 50 mcg (50 mcg Intravenous Given 10/10/21 2221)   ondansetron (ZOFRAN) injection 4 mg (4 mg Intravenous Given 10/10/21 2221)         PROGRESS, DATA ANALYSIS, CONSULTS, AND MEDICAL DECISION MAKING    No family or friend present.  I clinically suspect this gentleman has fractured hip potentially his pelvis or his right hip or femur.  I will check a CT scan of his head I do not see any signs of trauma.  I will also do a CT scan of his abdomen and pelvis as he is morbidly obese and has limited movement at baseline.  It will also better assess his pelvis and his hips      We are currently under a pandemic from the COVID19 infection.  The patient presented to the emergency department by ambulance or personal vehicle. I followed the current protocols required by Infection Control at [facility] in my evaluation and treatment of the patient. The patient was wearing a face mask during my evaluation and throughout my encounter. During my whole encounter with this patient I used appropriate personal protective equipment.  This equipment consisted of eye protection, facemask, gown, and gloves.  I applied this equipment before entering the room.    All labs have been independently reviewed by me.  All radiology studies have been reviewed by me and discussed with radiologist dictating the report.   EKG's independently viewed  and interpreted by me.  Discussion below represents my analysis of pertinent findings related to patient's condition, differential diagnosis, treatment plan and final disposition.      ED Course as of 10/11/21 0154   Sun Oct 10, 2021   2320 I looked at the x-ray and reviewed the radiologist report.  Patient has a proximal femur fracture that is angulated and displaced.  Please see complete dictated report from the radiologist. [MM]   2320 I have also reviewed the x-ray which was unremarkable.I reviewed the report of the CT scan of the head and please see complete dictated report for that. [MM]   2337 Patient is comfortable and does not appear in pain after the fentanyl.  There is someone that arrived from his facility here in the emergency department.  I informed her of the results of the x-rays and treatment plan.  Patient does have a guardian.  Patient's guardian is in [place].  He does not ambulate or stand on the leg and uses the [facility] for transfer.  They will need to contact the guardian tomorrow to find out the extent of medical care needed and the possibility of surgery. [MM]   2338 I discussed the case with Dr. Ambrose who is on for orthopedics.  He will consult on the patient. [MM]   2340 I discussed the case with Vale who is a nurse practitioner that is on for [facility].  Informed her of the results of the x-ray and the patient's history and exam.  She agrees to admit.  All questions answered. [MM]   Mon Oct 11, 2021   0009 Reviewed the CT scan report of the abdomen pelvis.  Patient has a fracture of the proximal right femur.  Has atelectasis in the base of his lungs.  Please see complete dictated report from the radiologist. [MM]      ED Course User Index  [MM] Francesco Xiong MD       AS OF 01:54 EDT VITALS:    BP - 156/95  HR - 88  TEMP - 97.5 °F (36.4 °C) (Oral)  02 SATS - 97%        DIAGNOSIS  Final diagnoses:   Closed displaced oblique fracture of shaft of right femur, initial encounter ([facility])   Cerebral  palsy, unspecified type (HCC): With chronic severe permanent neurologic deficits         DISPOSITION  I have reviewed the test results with my patient and explained the current treatment plan.  I answered all the patient's questions and concerns.  The patient is hemodynamically stable and is in no distress and is appropriate to be admitted to a medical/surgical floor bed at this time.             Francesco Xiong MD  10/11/21 0154

## 2021-10-11 NOTE — PROGRESS NOTES
Patient seen by DR Del Rio this am    Labs- wbc 11, pct normal, covid negative    Cerebral palsy    Seizure- med restarted    Constipation- continue miralax and suppositories

## 2021-10-11 NOTE — CASE MANAGEMENT/SOCIAL WORK
Discharge Planning Assessment  [facility]     Patient Name: Ned Basilio  MRN: [id number]  Today's Date: 10/11/2021    Admit Date: 10/10/2021     Discharge Needs Assessment     Row Name 10/11/21 1009       Living Environment    Lives With other (see comments)    Unique Family Situation two roommates as a part of the [facility].    Current Living Arrangements home/apartment/condo; group home    Primary Care Provided by self; homecare agency  [facility].    Provides Primary Care For no one, unable/limited ability to care for self    Quality of Family Relationships helpful; involved       Transition Planning    Patient/Family Anticipated Services at Transition     Transportation Anticipated health plan transportation       Discharge Needs Assessment    Readmission Within the Last 30 Days no previous admission in last 30 days    Equipment Currently Used at Home lift device    Discharge Facility/Level of Care Needs nursing facility, skilled    Provided Post Acute Provider List? Yes    Post Acute Provider List Nursing Home  SNF.    Provided Post Acute Provider Quality & Resource List? Yes    Post Acute Provider Quality and Resource List Nursing Home  SNF.    Delivered To Support Person    Support Person Jeff/Chanell Valencia.    Method of Delivery Telephone               Discharge Plan     Row Name 10/11/21 1023       Plan    Plan SNF -- Referrals Pending.    Patient/Family in Agreement with Plan yes    Plan Comments Per Epic documentation, the patient is not fully oriented at this time. Therefore CCP spoke with his guardian/Chanell Valencia (guardianship was confirmed via paperwork sent with the patient by the [facility]), verified current information and explained the role of the CCP. Patient lives in the [facility] in a home with two roommates. He receives assistance with his ADLs by the [facility]. Jeff/Chanell plans for the patient to d/c to SNF. CMS Compare SNF List was  reviewed in detail with Chanell. She requests referrals to [facility], [facility] [place]. Referrals sent in [facility]. Patient is planned to go to OR with Ortho. CCP will f/u for SNFs determinations.              Continued Care and Services - Admitted Since 10/10/2021     Destination     Service Provider Request Status Selected Services Address Phone Fax Patient Preferred    [facility]  Pending - Request Sent N/A [address], [facility] [id number] [phone] --    [facility]  Pending - Request Sent N/A [address] [id number] [phone] --    [facility]  Pending - Request Sent N/A [address] [id number] [phone] --    [facility]  Pending - Request Sent N/A [address] [facility] [id number] [phone] --    [facility]  Pending - Request Sent N/A [address] [facility] [id number] [phone] --                 Demographic Summary     Row Name 10/11/21 1009       General Information    Admission Type inpatient    Reason for Consult discharge planning    Preferred Language English     Used During This Interaction no       Contact Information    Permission Granted to Share Info With ; family/designee               Functional Status     Row Name 10/11/21 1009       Functional Status    Usual Activity Tolerance fair       Functional Status, IADL    Medications assistive person    Meal Preparation assistive person    Housekeeping assistive person    Laundry assistive person    Shopping assistive person               Psychosocial    No documentation.                Abuse/Neglect    No documentation.                Legal    No documentation.                Substance Abuse    No documentation.                Patient Forms    No documentation.                   Sruthi VERDIN  ABRAHAM Lopez

## 2021-10-11 NOTE — ANESTHESIA PREPROCEDURE EVALUATION
Anesthesia Evaluation     Patient summary reviewed and Nursing notes reviewed   no history of anesthetic complications:  NPO Solid Status: > 8 hours  NPO Liquid Status: > 2 hours           Airway   Large neck circumference and Possible difficult intubation  Dental      Pulmonary    Cardiovascular   Exercise tolerance: good (4-7 METS)    ECG reviewed    (+) CAD,       Neuro/Psych  (+) seizures,       ROS Comment: Cerebral Palsy  GI/Hepatic/Renal/Endo    (+) obesity,  GERD,      Musculoskeletal     Abdominal    Substance History      OB/GYN          Other            Phys Exam Other: Unable to fully evaluate airway given mental status                Anesthesia Plan    ASA 4     general   (Plan including RBA discussed with POA. )  intravenous induction     Anesthetic plan, all risks, benefits, and alternatives have been provided, discussed and informed consent has been obtained with: patient.

## 2021-10-11 NOTE — CONSULTS
Referring Provider: Dr. Pritchard  Reason for Consultation: Abnormal CXR    Patient Care Team:  Seth Villanueva MD as PCP - General (Internal Medicine)  Cedric Kwok MD as Consulting Physician (Gastroenterology)    Chief complaint:   Fall    History of present illness:    Subjective   This is a 65-year-old male patient, lifelong non-smoker with history of CP, Parkinson, seizure, GERD and chronic respiratory failure on oxygen 2 L/min.  There are no reported diagnosis of pulmonary disease but seems that patient takes inhaler therapy and Singulair which could be suggestive of asthma.  Patient is nonverbal and unable to provide with history.  No family at bedside to assist and therefore history was limited..    He sustained a fall at the assisted living facility resulting in right femur fracture.  Routine CXR in the ED showed pulmonary infiltrates prompting consultation.    He has no fever or significant leukocytosis.  Neutrophil counts is normal.  No bandemia.  Review of his prior imaging showed chronically elevated left hemidiaphragm.  Procalcitonin is normal.    He is currently on oxygen 2 L/min.  His SPO2 is above 90% but intermittently goes down to as low as 70% when he was awake (I witnessed one event).  Apparently the oxygen probe had issues previously and it was replaced.  I talked to his nurse and there are no reports of significant sustained desaturation.        Review of Systems  Cannot obtain due to baseline mental status    History  Past Medical History:   Diagnosis Date   • Anxiety    • Arthritis    • Sommers's esophagus    • Cardiomegaly    • Cerebral palsy (HCC)    • Constipation    • Disease of thyroid gland    • GERD (gastroesophageal reflux disease)    • Hyperlipidemia    • Osteoporosis    • Parkinson disease (HCC)    • Pre-operative cardiovascular examination    • Raynaud's disease    • Right hemiplegia (HCC)    • Seizures (HCC)    , No past surgical history on file.,    Family History   Problem Relation Age of Onset   • No Known Problems Mother    • No Known Problems Father    ,   Social History     Socioeconomic History   • Marital status: Single   Tobacco Use   • Smoking status: Never Smoker   • Smokeless tobacco: Never Used   Vaping Use   • Vaping Use: Never used   Substance and Sexual Activity   • Alcohol use: No   • Drug use: No     E-cigarette/Vaping   • E-cigarette/Vaping Use Never User        ,   Medications Prior to Admission   Medication Sig Dispense Refill Last Dose   • albuterol (PROVENTIL) (2.5 MG/3ML) 0.083% nebulizer solution Take 2.5 mg by nebulization Every 4 (Four) Hours As Needed for Wheezing.   Past Month at Unknown time   • aspirin 81 MG chewable tablet Chew 81 mg Daily.   10/10/2021 at 0800   • baclofen (LIORESAL) 10 MG tablet Take 10 mg by mouth 2 (Two) Times a Day.   10/10/2021 at 0800   • bisacodyl (LAXATIVE) 10 MG suppository Insert 10 mg into the rectum Daily.   10/9/2021 at 2000   • Calcium Citrate-Vitamin D (CALCIUM CITRATE + D PO) Take 1 tablet by mouth Daily.   10/10/2021 at 0800   • chlorhexidine (PERIDEX) 0.12 % solution    10/10/2021 at 0800   • Cholecalciferol (VITAMIN D-3) 1000 units capsule Take 1,000 Units by mouth 2 (Two) Times a Day.   10/10/2021 at 0800   • dantrolene (DANTRIUM) 25 MG capsule Take 25 mg by mouth Daily.   10/10/2021 at 0800   • Desitin 13 % cream cream    Past Week at Unknown time   • ezetimibe (ZETIA) 10 MG tablet Take 10 mg by mouth Daily.   10/10/2021 at 0800   • fluticasone (FLONASE) 50 MCG/ACT nasal spray    10/10/2021 at 0800   • isosorbide dinitrate (ISORDIL) 20 MG tablet Take 20 mg by mouth Every 12 (Twelve) Hours.   10/10/2021 at 0800   • lamoTRIgine (LaMICtal) 100 MG tablet Take 100 mg by mouth 2 (Two) Times a Day.   10/10/2021 at 0800   • levETIRAcetam (KEPPRA) 1000 MG tablet Take 1,000 mg by mouth 2 (Two) Times a Day.   10/10/2021 at 0800   • levothyroxine (SYNTHROID, LEVOTHROID) 125 MCG tablet Take 125 mcg by  mouth Daily.   10/10/2021 at 0600   • metoprolol tartrate (LOPRESSOR) 25 MG tablet Take 25 mg by mouth 2 (Two) Times a Day.   10/10/2021 at 0800   • Mi-Acid Gas Relief 80 MG chewable tablet CHEW 2 TABLETS 2 (TWO) TIMES A DAY FOR 30 DAYS. 120 tablet 11 10/10/2021 at 0800   • miconazole (MICOTIN) 2 % powder Apply  topically to the appropriate area as directed 2 (Two) Times a Day. Apply powder to intertriginous rash two times daily      • montelukast (SINGULAIR) 10 MG tablet Take 10 mg by mouth Every Night.   10/10/2021 at 0800   • Multiple Vitamins-Iron (TAB-A-KATE/IRON PO) Take 1 tablet by mouth Daily.   10/10/2021 at 0800   • omeprazole (priLOSEC) 40 MG capsule Take 40 mg by mouth Every 12 (Twelve) Hours.   10/10/2021 at 0800   • PARoxetine (PAXIL) 30 MG tablet    10/10/2021 at 0800   • polyethylene glycol 17 g packet    10/10/2021 at 0800   • senna (SENOKOT) 8.6 MG tablet tablet Take 3 tablets by mouth 2 (Two) Times a Day.   10/10/2021 at 0800   • traZODone (DESYREL) 100 MG tablet    10/9/2021 at 2000   • Vitamins/Minerals tablet tablet    10/10/2021 at 0800   • linaclotide (Linzess) 145 MCG capsule capsule Take 145 mcg by mouth Every Morning Before Breakfast. (Patient not taking: Reported on 10/10/2021)   Not Taking at Unknown time   • mometasone (NASONEX) 50 MCG/ACT nasal spray 2 sprays into the nostril(s) as directed by provider Daily. (Patient not taking: Reported on 10/10/2021)   Not Taking at Unknown time   • Plecanatide (Trulance) 3 MG tablet Take 1 tablet by mouth Daily. 30 tablet 5    • Plenvu 140 g reconstituted solution solution TAKE 19,600 G BY MOUTH TAKE AS DIRECTED. PLEASE FOLLOW PREP INSTRUCTIONS. 3 each 0    , Scheduled Meds:  levETIRAcetam, 1,000 mg, Intravenous, Q12H  metoprolol tartrate, 2.5 mg, Intravenous, Q12H    , Continuous Infusions:  sodium chloride, 75 mL/hr, Last Rate: 75 mL/hr (10/11/21 0307)     and Allergies:  Lovastatin    Objective     Vital Signs   Temp:  [96.8 °F (36 °C)-97.7 °F  (36.5 °C)] 96.8 °F (36 °C)  Heart Rate:  [66-88] 66  Resp:  [16-20] 20  BP: (106-163)/(65-95) 106/65    PPE used per hospital policy    Physical Exam:  Constitutional: Not in acute distress.  Eyes: Injected conjunctivae, EOMI. pupils equal reactive to light.  ENMT: Rodríguez 4. No oral thrush.  Large tongue  Neck: Large. Trachea midline. No thyromegaly  Heart: RRR, no murmur  Lungs: Equal but slightly diminished air entry throughout.  No wheezing or crackles..  Non labored breathing.   Abdomen: Obese. Soft. No tenderness or dullness. No HSM.  Extremities: No cyanosis, clubbing or pitting edema.  Warm extremities and well-perfused.  Edema in lower extremities.  Right hand/arm contracted.  Neuro: Conscious, alert, moves extremities slightly.  Psych: Cannot assess  Integumentary: No rash.  Normal skin turgor  Lymphatic: No palpable cervical or supraclavicular lymph nodes.      Diagnostic imaging:  I personally and independently reviewed the following images:   CXR on 10/10/2021 compared to 2019:  Chronically elevated left hemidiaphragm.  Low lung volumes.  No pulmonary infiltrates.      Lung portion of CT of the abdomen 10/10/2021: Bilateral lower lobe atelectasis.         Assessment     1. Chronic hypoxic respiratory failure  2. Bilateral lower lobe atelectasis  3. Chronically elevated left hemidiaphragm, possible paralysis  4. Right femur fracture s/p mechanical fall    Recommendations:    · Continue oxygen 2 L/min.  · Can resume outpatient inhaler therapy though unclear why patient was on those treatment.  Since he will unlikely cooperate well with MDI/HFA inhaler then I will place him on DuoNeb three times a day.  · No need for antibiotic therapy  · I-S to prevent atelectasis.  Unclear if he would be able to cooperate but I will go ahead and order  · High operative risk due to probable underlying sleep apnea and underlying mental disability which would would make cooperation with therapy and pulmonary clearance  difficult and therefore increase the risk of pneumonia and respiratory failure.  Would recommend minimizing narcotics postoperatively.  Minimizing exposure to anesthesia during surgery and preferably use block anesthesia over general anesthesia.  Use of NIPPV postop if needed for alteration in mental status/hypercarbia or recurrent hypoxemia.        Discussed with nursing staff    Chavo Kerns MD  10/11/21  10:06 EDT

## 2021-10-11 NOTE — ED NOTES
Patient was being transferred into bed with a standing cameron lift and fell onto right leg. Patient is nonverbal with a history of cerebral palsy. Caregiver witnessed fall. Patient did not hit head and does not take blood thinners.      Kendra Woodall RN  10/10/21 2017       Kendra Woodall RN  10/10/21 2020

## 2021-10-11 NOTE — ED NOTES
Patient nonverbal and contracted at baseline. EMS and NH noticed tenderness to R hip post fall from cameron lift transfer. Patient has facial grimace and repeatedly moaning. Patient reassured.     Pt noted to have mask on when this RN entered the room.  This RN wore appropriate PPE throughout our encounter. Hand hygiene performed upon entering and exiting room.       Jackelyn Rankin, ABRAHAM  10/10/21 2034

## 2021-10-11 NOTE — DISCHARGE PLACEMENT REQUEST
"Dillon Basilio (65 y.o. Male)             Date of Birth Social Security Number Address Home Phone MRN    1955  [id number]   [place] [id number] [phone] [id number]    Shinto Marital Status             None Single       Admission Date Admission Type Admitting Provider Attending Provider Department, Room/Bed    10/10/21 Emergency RayNatalio MD Edling, Stephen A, MD [facility] [address], P877/1    Discharge Date Discharge Disposition Discharge Destination                         Attending Provider: Mandeep Pritchard MD    Allergies: Lovastatin    Isolation: None   Infection: None   Code Status: CPR   Advance Care Planning Activity    Ht: 152.4 cm (60\")   Wt: 90.5 kg (199 lb 8 oz)    Admission Cmt: None   Principal Problem: Closed displaced oblique fracture of shaft of right femur (HCC) [S72.331A]                 Active Insurance as of 10/10/2021     Primary Coverage     Payor Plan Insurance Group Employer/Plan Group    MEDICARE MEDICARE A & B      Payor Plan Address Payor Plan Phone Number Payor Plan Fax Number Effective Dates    [address] [phone]  11/1/1976 - None Entered    [facility] [id number]       Subscriber Name Subscriber Birth Date Member ID       DILLON BASILIO 1955 [id number]           Secondary Coverage     Payor Plan Insurance Group Employer/Plan Group    [place]      Payor Plan Address Payor Plan Phone Number Payor Plan Fax Number Effective Dates    [address] [phone]  3/4/2019 - None Entered    [place] [address]       Subscriber Name Subscriber Birth Date Member ID       DILLON BASILIO 1955 [id number]                 Emergency Contacts      (Rel.) Home Phone Work Phone Mobile Phone    Chanell Valencia (Guardian) [phone] -- [phone]    REJI GREGORIO () () [phone] -- --    Sweta Rdz (Guardian) [phone] -- --    EMANUAL,UNKNOWN (Care Giver) -- -- " [phone]

## 2021-10-11 NOTE — CONSULTS
Orthopedic Consult      Patient: Ned Basilio    Date of Admission: 10/10/2021  8:24 PM    YOB: 1955    Medical Record Number: [id number]    Consulting Physician: Josse Carrillo MD    Chief Complaints: Right femur fracture    History of Present Illness: 65 y.o. male admitted to [facility] to services of Mandeep Pritchard MD with history of cerebral palsy.  Patient is nonverbal and nonambulatory.  He lives in a nursing home and apparently they were transferring him with a [employer]ana lift and he either fell or was dropped resulting in a proximal third femoral shaft fracture.  Patient arouses easily when awakened.  Did get him to blink once for yes and 2 for no when asking him some questions.  He was able to tell me that he was having pain.  Was unable to get him to cooperate with neurovascular exam.  Legs are externally rotated and does have flexion contractures of both knees and ankles.  Right leg is shorter than the left leg due to the fracture.    Allergies:   Allergies   Allergen Reactions    Lovastatin Anaphylaxis     Unknown reaction; Facility did not provide information.        Home Medications:    Current Facility-Administered Medications:     acetaminophen (TYLENOL) suppository 650 mg, 650 mg, Rectal, Q4H PRN, Natalio Loaiza APRN    bisacodyl (DULCOLAX) suppository 10 mg, 10 mg, Rectal, Daily PRN, Natalio Loaiza APRN    HYDROmorphone (DILAUDID) injection 1 mg, 1 mg, Intravenous, Q2H PRN **AND** naloxone (NARCAN) injection 0.4 mg, 0.4 mg, Intravenous, Q5 Min PRN, Natalio Loaiza APRN    ipratropium-albuterol (DUO-NEB) nebulizer solution 3 mL, 3 mL, Nebulization, TID, Chavo Kerns MD    levETIRAcetam in NaCl 0.75% (KEPPRA) IVPB 1,000 mg, 1,000 mg, Intravenous, Q12H, Natalio Loaiza APRN, 1,000 mg at 10/11/21 0445    melatonin tablet 3 mg, 3 mg, Oral, Nightly PRN, Natalio Loaiza APRN    metoprolol tartrate (LOPRESSOR) injection 2.5 mg, 2.5 mg, Intravenous,  Q12H, Natalio Loaiza APRN, 2.5 mg at 10/11/21 0441    ondansetron (ZOFRAN) injection 4 mg, 4 mg, Intravenous, Q6H PRN, Natalio Loaiza APRN    [COMPLETED] Insert peripheral IV, , , Once **AND** sodium chloride 0.9 % flush 10 mL, 10 mL, Intravenous, PRN, Francesco Xiong MD    [COMPLETED] Insert peripheral IV, , , Once **AND** sodium chloride 0.9 % flush 10 mL, 10 mL, Intravenous, PRN, Francesco Xiong MD    sodium chloride 0.9 % infusion, 75 mL/hr, Intravenous, Continuous, Natalio Loaiza APRN, Last Rate: 75 mL/hr at 10/11/21 0307, 75 mL/hr at 10/11/21 0307    Current Medications:  Scheduled Meds:ipratropium-albuterol, 3 mL, Nebulization, TID  levETIRAcetam, 1,000 mg, Intravenous, Q12H  metoprolol tartrate, 2.5 mg, Intravenous, Q12H      Continuous Infusions:sodium chloride, 75 mL/hr, Last Rate: 75 mL/hr (10/11/21 0307)      PRN Meds:.  acetaminophen    bisacodyl    HYDROmorphone **AND** naloxone    melatonin    ondansetron    [COMPLETED] Insert peripheral IV **AND** sodium chloride    [COMPLETED] Insert peripheral IV **AND** sodium chloride    Past Medical History:   Diagnosis Date    Anxiety     Arthritis     Sommers's esophagus     Cardiomegaly     Cerebral palsy (HCC)     Constipation     Disease of thyroid gland     GERD (gastroesophageal reflux disease)     Hyperlipidemia     Osteoporosis     Parkinson disease (HCC)     Pre-operative cardiovascular examination     Raynaud's disease     Right hemiplegia (HCC)     Seizures (HCC)        No past surgical history on file.    Social History     Occupational History    Not on file   Tobacco Use    Smoking status: Never Smoker    Smokeless tobacco: Never Used   Vaping Use    Vaping Use: Never used   Substance and Sexual Activity    Alcohol use: No    Drug use: No    Sexual activity: Not on file      Social History     Social History Narrative    Not on file       Family History   Problem Relation Age of Onset    No Known Problems Mother     No Known  "Problems Father        Review of Systems:           All other pertinent positives and negatives as noted above in HPI.    Physical Exam: 65 y.o. male    Vitals:    10/11/21 0145 10/11/21 0440 10/11/21 0502 10/11/21 0842   BP: 156/95 115/77 115/77 106/65   BP Location: Right arm Right arm Right arm Right arm   Patient Position: Lying Lying Lying Lying   Pulse: 88 83 83 66   Resp: 18  16 20   Temp: 97.5 °F (36.4 °C)  97.5 °F (36.4 °C) 96.8 °F (36 °C)   TempSrc: Oral  Oral Skin   SpO2: 97% 95% 96% 99%   Weight: 90.5 kg (199 lb 8 oz)      Height:   152.4 cm (60\")      General:  Awake, alert. Non verbal, at his baseline    Extremities:  RLE:  Skin appears benign without obvious lacerations, ulcerations or lesions.  Hip is in shortened external position.  Difficulty range of motion due to discomfort.  Slight flexion contracture of the knee and hip.  Hip can internally rotate per nursing when they reposition.  Contracture of the ankle in which it is in about 90 degrees positioning.  2+ cap refill.  Decrease muscle mass consistent with inactivity/nonuse.      Left lower extremity decreased muscle mass consistent with nonuse.  Slight hip and knee flexion contractures.  Contracture of the ankle which is in about 90 degrees positioning consistent with the contralateral side.  2+ cap refill.  No tenderness to palpation.      Lateral upper extremities contraction of the wrist and fingers.  No evidence of abnormality or pain with palpation to bilateral upper extremities.  Skin intact.        Diagnostic Tests:    Admission on 10/10/2021   Component Date Value Ref Range Status    Glucose 10/10/2021 106* 65 - 99 mg/dL Final    BUN 10/10/2021 5* 8 - 23 mg/dL Final    Creatinine 10/10/2021 0.78  0.76 - 1.27 mg/dL Final    Sodium 10/10/2021 140  136 - 145 mmol/L Final    Potassium 10/10/2021 4.1  3.5 - 5.2 mmol/L Final    Chloride 10/10/2021 103  98 - 107 mmol/L Final    CO2 10/10/2021 29.2* 22.0 - 29.0 mmol/L Final    Calcium " 10/10/2021 8.8  8.6 - 10.5 mg/dL Final    Total Protein 10/10/2021 6.9  6.0 - 8.5 g/dL Final    Albumin 10/10/2021 4.10  3.50 - 5.20 g/dL Final    ALT (SGPT) 10/10/2021 17  1 - 41 U/L Final    AST (SGOT) 10/10/2021 18  1 - 40 U/L Final    Alkaline Phosphatase 10/10/2021 89  39 - 117 U/L Final    Total Bilirubin 10/10/2021 0.2  0.0 - 1.2 mg/dL Final    eGFR Non  Amer 10/10/2021 100  >60 mL/min/1.73 Final    Globulin 10/10/2021 2.8  gm/dL Final    A/G Ratio 10/10/2021 1.5  g/dL Final    BUN/Creatinine Ratio 10/10/2021 6.4* 7.0 - 25.0 Final    Anion Gap 10/10/2021 7.8  5.0 - 15.0 mmol/L Final    Protime 10/10/2021 13.1  11.7 - 14.2 Seconds Final    INR 10/10/2021 1.01  0.90 - 1.10 Final    WBC 10/10/2021 11.23* 3.40 - 10.80 10*3/mm3 Final    RBC 10/10/2021 4.24  4.14 - 5.80 10*6/mm3 Final    Hemoglobin 10/10/2021 13.6  13.0 - 17.7 g/dL Final    Hematocrit 10/10/2021 42.2  37.5 - 51.0 % Final    MCV 10/10/2021 99.5* 79.0 - 97.0 fL Final    MCH 10/10/2021 32.1  26.6 - 33.0 pg Final    MCHC 10/10/2021 32.2  31.5 - 35.7 g/dL Final    RDW 10/10/2021 12.6  12.3 - 15.4 % Final    RDW-SD 10/10/2021 46.9  37.0 - 54.0 fl Final    MPV 10/10/2021 10.2  6.0 - 12.0 fL Final    Platelets 10/10/2021 245  140 - 450 10*3/mm3 Final    Neutrophil % 10/10/2021 74.3  42.7 - 76.0 % Final    Lymphocyte % 10/10/2021 15.2* 19.6 - 45.3 % Final    Monocyte % 10/10/2021 7.8  5.0 - 12.0 % Final    Eosinophil % 10/10/2021 2.0  0.3 - 6.2 % Final    Basophil % 10/10/2021 0.4  0.0 - 1.5 % Final    Immature Grans % 10/10/2021 0.3  0.0 - 0.5 % Final    Neutrophils, Absolute 10/10/2021 8.33* 1.70 - 7.00 10*3/mm3 Final    Lymphocytes, Absolute 10/10/2021 1.71  0.70 - 3.10 10*3/mm3 Final    Monocytes, Absolute 10/10/2021 0.88  0.10 - 0.90 10*3/mm3 Final    Eosinophils, Absolute 10/10/2021 0.23  0.00 - 0.40 10*3/mm3 Final    Basophils, Absolute 10/10/2021 0.05  0.00 - 0.20 10*3/mm3 Final    Immature Grans, Absolute 10/10/2021 0.03  0.00 - 0.05  10*3/mm3 Final    nRBC 10/10/2021 0.0  0.0 - 0.2 /100 WBC Final    COVID19 10/10/2021 Not Detected  Not Detected - Ref. Range Final    Procalcitonin 10/10/2021 0.03  0.00 - 0.25 ng/mL Final     Lab Results (last 24 hours)       Procedure Component Value Units Date/Time    COVID PRE-OP / PRE-PROCEDURE SCREENING ORDER (NO ISOLATION) - Swab, Nasopharynx [[id number]]  (Normal) Collected: 10/10/21 2326    Specimen: Swab from Nasopharynx Updated: 10/11/21 0222    Narrative:      The following orders were created for panel order COVID PRE-OP / PRE-PROCEDURE SCREENING ORDER (NO ISOLATION) - Swab, Nasopharynx.  Procedure                               Abnormality         Status                     ---------                               -----------         ------                     COVID-19,[facility]...[[id number]]  Normal              Final result                 Please view results for these tests on the individual orders.    COVID-19,[facility] CEPHEID/KATHRYN NP SWAB IN TRANSPORT MEDIA 8-12 HR TAT - Swab, Nasopharynx [[id number]]  (Normal) Collected: 10/10/21 2326    Specimen: Swab from Nasopharynx Updated: 10/11/21 0222     COVID19 Not Detected    Narrative:      Fact sheet for providers: https://www.fda.gov/media/899768/download     Fact sheet for patients: https://www.fda.gov/media/432948/download    Procalcitonin [[id number]]  (Normal) Collected: 10/10/21 2218    Specimen: Blood Updated: 10/11/21 0136     Procalcitonin 0.03 ng/mL     Narrative:      As a Marker for Sepsis (Non-Neonates):     1. <0.5 ng/mL represents a low risk of severe sepsis and/or septic shock.  2. >2 ng/mL represents a high risk of severe sepsis and/or septic shock.    As a Marker for Lower Respiratory Tract Infections that require antibiotic therapy:  PCT on Admission     Antibiotic Therapy             6-12 Hrs later  >0.5                          Strongly Recommended            >0.25 - <0.5             Recommended  0.1 - 0.25                 "  Discouraged                       Remeasure/reassess PCT  <0.1                         Strongly Discouraged         Remeasure/reassess PCT      As 28 day mortality risk marker: \"Change in Procalcitonin Result\" (>80% or <=80%) if Day 0 (or Day 1) and Day 4 values are available. Refer to http://www.[facility] [email or web address]-calculator.[email or web address]/    Change in PCT <=80 %   A decrease of PCT levels below or equal to 80% defines a positive change in PCT test result representing a higher risk for 28-day all-cause mortality of patients diagnosed with severe sepsis or septic shock.    Change in PCT >80 %   A decrease of PCT levels of more than 80% defines a negative change in PCT result representing a lower risk for 28-day all-cause mortality of patients diagnosed with severe sepsis or septic shock.              Results may be falsely decreased if patient taking Biotin.     Protime-INR [[id number]]  (Normal) Collected: 10/10/21 2218    Specimen: Blood Updated: 10/10/21 2257     Protime 13.1 Seconds      INR 1.01    Comprehensive Metabolic Panel [[id number]]  (Abnormal) Collected: 10/10/21 2218    Specimen: Blood Updated: 10/10/21 2253     Glucose 106 mg/dL      BUN 5 mg/dL      Creatinine 0.78 mg/dL      Sodium 140 mmol/L      Potassium 4.1 mmol/L      Chloride 103 mmol/L      CO2 29.2 mmol/L      Calcium 8.8 mg/dL      Total Protein 6.9 g/dL      Albumin 4.10 g/dL      ALT (SGPT) 17 U/L      AST (SGOT) 18 U/L      Alkaline Phosphatase 89 U/L      Total Bilirubin 0.2 mg/dL      eGFR Non African Amer 100 mL/min/1.73      Globulin 2.8 gm/dL      A/G Ratio 1.5 g/dL      BUN/Creatinine Ratio 6.4     Anion Gap 7.8 mmol/L     Narrative:      GFR Normal >60  Chronic Kidney Disease <60  Kidney Failure <15      CBC & Differential [[id number]]  (Abnormal) Collected: 10/10/21 2218    Specimen: Blood Updated: 10/10/21 2242    Narrative:      The following orders were created for panel order CBC & Differential.  Procedure                               " Abnormality         Status                     ---------                               -----------         ------                     CBC Auto Differential[[id number]]        Abnormal            Final result                 Please view results for these tests on the individual orders.    CBC Auto Differential [[id number]]  (Abnormal) Collected: 10/10/21 2218    Specimen: Blood Updated: 10/10/21 2242     WBC 11.23 10*3/mm3      RBC 4.24 10*6/mm3      Hemoglobin 13.6 g/dL      Hematocrit 42.2 %      MCV 99.5 fL      MCH 32.1 pg      MCHC 32.2 g/dL      RDW 12.6 %      RDW-SD 46.9 fl      MPV 10.2 fL      Platelets 245 10*3/mm3      Neutrophil % 74.3 %      Lymphocyte % 15.2 %      Monocyte % 7.8 %      Eosinophil % 2.0 %      Basophil % 0.4 %      Immature Grans % 0.3 %      Neutrophils, Absolute 8.33 10*3/mm3      Lymphocytes, Absolute 1.71 10*3/mm3      Monocytes, Absolute 0.88 10*3/mm3      Eosinophils, Absolute 0.23 10*3/mm3      Basophils, Absolute 0.05 10*3/mm3      Immature Grans, Absolute 0.03 10*3/mm3      nRBC 0.0 /100 WBC             Imaging: Right hip films show proximal femoral shaft fracture with oblique pattern.    Assessment: Right angulated proximal femoral shaft fracture.      Plan: Would recommend surgical fixation for pain control as well as hygiene.  Plan IM rodding of the right femur later today pending pulmonary clearance    Myself and my nurse did speak with his POA who consented for surgical intervention we discussed risk and benefits of surgery with risk including but limited to bleeding, infection, continued pain, worsening pain, nonunion, malunion, fracture, dislocation, possible need for future procedures surgeries and even possible death anesthesia.  His POA consented to proceed    Continue n.p.o.    Plan for OR this evening.    Date: 10/11/2021    Christal Otero RN    CC: Mandeep Pritchard MD Thomas Schlierf, MD  10/11/21

## 2021-10-12 PROBLEM — D62 ABLA (ACUTE BLOOD LOSS ANEMIA): Status: ACTIVE | Noted: 2021-10-12

## 2021-10-12 LAB
25(OH)D3 SERPL-MCNC: 44.3 NG/ML (ref 30–100)
ABO GROUP BLD: NORMAL
ANION GAP SERPL CALCULATED.3IONS-SCNC: 7.4 MMOL/L (ref 5–15)
BASOPHILS # BLD AUTO: 0.01 10*3/MM3 (ref 0–0.2)
BASOPHILS NFR BLD AUTO: 0.1 % (ref 0–1.5)
BLD GP AB SCN SERPL QL: NEGATIVE
BUN SERPL-MCNC: 8 MG/DL (ref 8–23)
BUN/CREAT SERPL: 13.1 (ref 7–25)
CALCIUM SPEC-SCNC: 7.7 MG/DL (ref 8.6–10.5)
CHLORIDE SERPL-SCNC: 103 MMOL/L (ref 98–107)
CO2 SERPL-SCNC: 26.6 MMOL/L (ref 22–29)
CREAT SERPL-MCNC: 0.61 MG/DL (ref 0.76–1.27)
DEPRECATED RDW RBC AUTO: 42.3 FL (ref 37–54)
EOSINOPHIL # BLD AUTO: 0 10*3/MM3 (ref 0–0.4)
EOSINOPHIL NFR BLD AUTO: 0 % (ref 0.3–6.2)
ERYTHROCYTE [DISTWIDTH] IN BLOOD BY AUTOMATED COUNT: 12.1 % (ref 12.3–15.4)
GFR SERPL CREATININE-BSD FRML MDRD: 133 ML/MIN/1.73
GLUCOSE SERPL-MCNC: 134 MG/DL (ref 65–99)
HCT VFR BLD AUTO: 24.8 % (ref 37.5–51)
HCT VFR BLD AUTO: 24.9 % (ref 37.5–51)
HGB BLD-MCNC: 7.9 G/DL (ref 13–17.7)
HGB BLD-MCNC: 8.3 G/DL (ref 13–17.7)
IMM GRANULOCYTES # BLD AUTO: 0.05 10*3/MM3 (ref 0–0.05)
IMM GRANULOCYTES NFR BLD AUTO: 0.5 % (ref 0–0.5)
LYMPHOCYTES # BLD AUTO: 1.31 10*3/MM3 (ref 0.7–3.1)
LYMPHOCYTES NFR BLD AUTO: 12.5 % (ref 19.6–45.3)
MCH RBC QN AUTO: 32.3 PG (ref 26.6–33)
MCHC RBC AUTO-ENTMCNC: 33.5 G/DL (ref 31.5–35.7)
MCV RBC AUTO: 96.5 FL (ref 79–97)
MONOCYTES # BLD AUTO: 1.08 10*3/MM3 (ref 0.1–0.9)
MONOCYTES NFR BLD AUTO: 10.3 % (ref 5–12)
NEUTROPHILS NFR BLD AUTO: 76.6 % (ref 42.7–76)
NEUTROPHILS NFR BLD AUTO: 8.04 10*3/MM3 (ref 1.7–7)
NRBC BLD AUTO-RTO: 0 /100 WBC (ref 0–0.2)
PLATELET # BLD AUTO: 201 10*3/MM3 (ref 140–450)
PMV BLD AUTO: 10.2 FL (ref 6–12)
POTASSIUM SERPL-SCNC: 4.3 MMOL/L (ref 3.5–5.2)
RBC # BLD AUTO: 2.57 10*6/MM3 (ref 4.14–5.8)
RH BLD: POSITIVE
SODIUM SERPL-SCNC: 137 MMOL/L (ref 136–145)
T&S EXPIRATION DATE: NORMAL
WBC # BLD AUTO: 10.49 10*3/MM3 (ref 3.4–10.8)

## 2021-10-12 PROCEDURE — 36415 COLL VENOUS BLD VENIPUNCTURE: CPT | Performed by: ORTHOPAEDIC SURGERY

## 2021-10-12 PROCEDURE — 86900 BLOOD TYPING SEROLOGIC ABO: CPT

## 2021-10-12 PROCEDURE — P9016 RBC LEUKOCYTES REDUCED: HCPCS

## 2021-10-12 PROCEDURE — 86923 COMPATIBILITY TEST ELECTRIC: CPT

## 2021-10-12 PROCEDURE — 36430 TRANSFUSION BLD/BLD COMPNT: CPT

## 2021-10-12 PROCEDURE — 94640 AIRWAY INHALATION TREATMENT: CPT

## 2021-10-12 PROCEDURE — 86901 BLOOD TYPING SEROLOGIC RH(D): CPT

## 2021-10-12 PROCEDURE — 25010000003 CEFAZOLIN IN DEXTROSE 2-4 GM/100ML-% SOLUTION: Performed by: ORTHOPAEDIC SURGERY

## 2021-10-12 PROCEDURE — 86901 BLOOD TYPING SEROLOGIC RH(D): CPT | Performed by: ORTHOPAEDIC SURGERY

## 2021-10-12 PROCEDURE — 82306 VITAMIN D 25 HYDROXY: CPT | Performed by: ORTHOPAEDIC SURGERY

## 2021-10-12 PROCEDURE — 86850 RBC ANTIBODY SCREEN: CPT | Performed by: ORTHOPAEDIC SURGERY

## 2021-10-12 PROCEDURE — 86900 BLOOD TYPING SEROLOGIC ABO: CPT | Performed by: ORTHOPAEDIC SURGERY

## 2021-10-12 PROCEDURE — 80048 BASIC METABOLIC PNL TOTAL CA: CPT | Performed by: ORTHOPAEDIC SURGERY

## 2021-10-12 PROCEDURE — 85018 HEMOGLOBIN: CPT | Performed by: NURSE PRACTITIONER

## 2021-10-12 PROCEDURE — 99024 POSTOP FOLLOW-UP VISIT: CPT | Performed by: ORTHOPAEDIC SURGERY

## 2021-10-12 PROCEDURE — 94799 UNLISTED PULMONARY SVC/PX: CPT

## 2021-10-12 PROCEDURE — 85025 COMPLETE CBC W/AUTO DIFF WBC: CPT

## 2021-10-12 PROCEDURE — 85014 HEMATOCRIT: CPT | Performed by: NURSE PRACTITIONER

## 2021-10-12 PROCEDURE — 92610 EVALUATE SWALLOWING FUNCTION: CPT

## 2021-10-12 RX ORDER — ASPIRIN 81 MG/1
81 TABLET, CHEWABLE ORAL DAILY
Start: 2021-10-12 | End: 2021-10-19 | Stop reason: HOSPADM

## 2021-10-12 RX ORDER — POLYETHYLENE GLYCOL 3350 17 G/17G
17 POWDER, FOR SOLUTION ORAL DAILY
Start: 2021-10-13

## 2021-10-12 RX ORDER — HYDROCODONE BITARTRATE AND ACETAMINOPHEN 7.5; 325 MG/1; MG/1
TABLET ORAL
Qty: 42 TABLET | Refills: 0 | Status: ON HOLD | OUTPATIENT
Start: 2021-10-12 | End: 2022-09-28

## 2021-10-12 RX ORDER — ERGOCALCIFEROL 1.25 MG/1
50000 CAPSULE ORAL
Status: DISCONTINUED | OUTPATIENT
Start: 2021-10-12 | End: 2021-10-20 | Stop reason: HOSPADM

## 2021-10-12 RX ADMIN — SENNOSIDES 3 TABLET: 8.6 TABLET, FILM COATED ORAL at 22:20

## 2021-10-12 RX ADMIN — POLYETHYLENE GLYCOL 3350 17 G: 17 POWDER, FOR SOLUTION ORAL at 08:40

## 2021-10-12 RX ADMIN — LEVETIRACETAM 1000 MG: 500 TABLET, FILM COATED ORAL at 22:20

## 2021-10-12 RX ADMIN — LAMOTRIGINE 100 MG: 100 TABLET ORAL at 00:22

## 2021-10-12 RX ADMIN — SENNOSIDES 3 TABLET: 8.6 TABLET, FILM COATED ORAL at 00:23

## 2021-10-12 RX ADMIN — HYDROCODONE BITARTRATE AND ACETAMINOPHEN 1 TABLET: 7.5; 325 TABLET ORAL at 22:19

## 2021-10-12 RX ADMIN — BISACODYL 10 MG: 10 SUPPOSITORY RECTAL at 08:40

## 2021-10-12 RX ADMIN — MONTELUKAST SODIUM 10 MG: 10 TABLET, FILM COATED ORAL at 00:23

## 2021-10-12 RX ADMIN — PAROXETINE HYDROCHLORIDE 30 MG: 30 TABLET, FILM COATED ORAL at 00:34

## 2021-10-12 RX ADMIN — DANTROLENE SODIUM 25 MG: 25 CAPSULE ORAL at 08:41

## 2021-10-12 RX ADMIN — TRAZODONE HYDROCHLORIDE 50 MG: 50 TABLET ORAL at 22:21

## 2021-10-12 RX ADMIN — DOCUSATE SODIUM 100 MG: 100 CAPSULE, LIQUID FILLED ORAL at 00:47

## 2021-10-12 RX ADMIN — HYDROCODONE BITARTRATE AND ACETAMINOPHEN 1 TABLET: 7.5; 325 TABLET ORAL at 08:40

## 2021-10-12 RX ADMIN — BACLOFEN 10 MG: 10 TABLET ORAL at 08:41

## 2021-10-12 RX ADMIN — ASPIRIN 81 MG: 81 TABLET, COATED ORAL at 08:40

## 2021-10-12 RX ADMIN — HYDROCODONE BITARTRATE AND ACETAMINOPHEN 1 TABLET: 7.5; 325 TABLET ORAL at 12:21

## 2021-10-12 RX ADMIN — IPRATROPIUM BROMIDE AND ALBUTEROL SULFATE 3 ML: 2.5; .5 SOLUTION RESPIRATORY (INHALATION) at 16:35

## 2021-10-12 RX ADMIN — DOCUSATE SODIUM 100 MG: 100 CAPSULE, LIQUID FILLED ORAL at 22:21

## 2021-10-12 RX ADMIN — ERGOCALCIFEROL 50000 UNITS: 1.25 CAPSULE ORAL at 16:53

## 2021-10-12 RX ADMIN — LEVOTHYROXINE SODIUM 125 MCG: 0.12 TABLET ORAL at 06:45

## 2021-10-12 RX ADMIN — SODIUM CHLORIDE, PRESERVATIVE FREE 3 ML: 5 INJECTION INTRAVENOUS at 22:22

## 2021-10-12 RX ADMIN — MONTELUKAST SODIUM 10 MG: 10 TABLET, FILM COATED ORAL at 22:19

## 2021-10-12 RX ADMIN — SENNOSIDES 3 TABLET: 8.6 TABLET, FILM COATED ORAL at 08:41

## 2021-10-12 RX ADMIN — LAMOTRIGINE 100 MG: 100 TABLET ORAL at 08:41

## 2021-10-12 RX ADMIN — FLUTICASONE PROPIONATE 1 SPRAY: 50 SPRAY, METERED NASAL at 00:33

## 2021-10-12 RX ADMIN — Medication 1000 UNITS: at 08:40

## 2021-10-12 RX ADMIN — HYDROCODONE BITARTRATE AND ACETAMINOPHEN 1 TABLET: 7.5; 325 TABLET ORAL at 16:53

## 2021-10-12 RX ADMIN — BACLOFEN 10 MG: 10 TABLET ORAL at 22:20

## 2021-10-12 RX ADMIN — IPRATROPIUM BROMIDE AND ALBUTEROL SULFATE 3 ML: 2.5; .5 SOLUTION RESPIRATORY (INHALATION) at 07:51

## 2021-10-12 RX ADMIN — LEVETIRACETAM 1000 MG: 500 TABLET, FILM COATED ORAL at 00:22

## 2021-10-12 RX ADMIN — HYDROCODONE BITARTRATE AND ACETAMINOPHEN 1 TABLET: 7.5; 325 TABLET ORAL at 00:46

## 2021-10-12 RX ADMIN — PANTOPRAZOLE SODIUM 40 MG: 40 TABLET, DELAYED RELEASE ORAL at 06:45

## 2021-10-12 RX ADMIN — TRAZODONE HYDROCHLORIDE 50 MG: 50 TABLET ORAL at 00:24

## 2021-10-12 RX ADMIN — CEFAZOLIN SODIUM 2 G: 2 INJECTION, SOLUTION INTRAVENOUS at 01:36

## 2021-10-12 RX ADMIN — CEFAZOLIN SODIUM 2 G: 2 INJECTION, SOLUTION INTRAVENOUS at 08:42

## 2021-10-12 RX ADMIN — LEVETIRACETAM 1000 MG: 500 TABLET, FILM COATED ORAL at 08:41

## 2021-10-12 RX ADMIN — BACLOFEN 10 MG: 10 TABLET ORAL at 00:22

## 2021-10-12 RX ADMIN — Medication 1000 UNITS: at 22:23

## 2021-10-12 RX ADMIN — LAMOTRIGINE 100 MG: 100 TABLET ORAL at 22:20

## 2021-10-12 RX ADMIN — DOCUSATE SODIUM 100 MG: 100 CAPSULE, LIQUID FILLED ORAL at 08:57

## 2021-10-12 RX ADMIN — IPRATROPIUM BROMIDE AND ALBUTEROL SULFATE 3 ML: 2.5; .5 SOLUTION RESPIRATORY (INHALATION) at 21:10

## 2021-10-12 NOTE — SIGNIFICANT NOTE
10/12/21 1235   OTHER   Discipline occupational therapist   Rehab Time/Intention   Session Not Performed other (see comments)  (pt from [facility],. uses cameron lift, dep w. ADLs and tsf. pt not skilled OT appropriate. OT signing off. [phone])

## 2021-10-12 NOTE — PROGRESS NOTES
Noted patient had surgery and currently on 2 L only.    Continue effort to improve mucus clearance with nebulizer therapy and use of I-S to prevent atelectasis.  Otherwise I have no additional recommendations.  We will see only if needed.  Thank you for the consult

## 2021-10-12 NOTE — PLAN OF CARE
Goal Outcome Evaluation:  Plan of Care Reviewed With: patient        Progress: improving  Outcome Summary: POD#1 OF LEFT HIP NAILING. DRESSING TO HIP DRY/ INTACT. VSS. PO PAIN MEDICATION HELPING WITH PAIN. VOIDING PER PUREWICK. TAKES PILLS WHOLE WITH APPLE SAUCE. LIMITED VERBAL RESPONSE.  PATIENT HAS CEREBRAL PALSY.  EDUCATION PROVIDED ON PIAN CONTROL . NO EVIDENCE OF LEARNING. WILL CONTINUE TO MONITOR.

## 2021-10-12 NOTE — THERAPY EVALUATION
Acute Care - Speech Language Pathology   Swallow Initial Evaluation [facility]     Patient Name: Ned Basilio  : 1955  MRN: [id number]  Today's Date: 10/12/2021               Admit Date: 10/10/2021    Visit Dx:     ICD-10-CM ICD-9-CM   1. Closed displaced oblique fracture of shaft of right femur, initial encounter ([facility])  S72.331A 821.01   2. Cerebral palsy, unspecified type ([facility]): With chronic severe permanent neurologic deficits  G80.9 343.9     Patient Active Problem List   Diagnosis   • History of colon polyps   • Colon cancer screening   • Complicated UTI (urinary tract infection)   • Coronary artery disease   • Fever   • HCAP (healthcare-associated pneumonia)   • HLD (hyperlipidemia)   • Hypothyroidism   • Septic shock ([facility])   • Tracheal compression   • DVT prophylaxis   • Closed displaced oblique fracture of shaft of right femur (HCC)   • Cerebral palsy (HCC)   • Parkinson disease ([facility])   • Osteoporosis   • Chronic respiratory failure with hypoxia ([facility])   • Chronic constipation   • Seizure disorder ([facility])   • ABLA (acute blood loss anemia)   • Sommers's esophagus     Past Medical History:   Diagnosis Date   • Anxiety    • Arthritis    • Sommers's esophagus    • Cardiomegaly    • Cerebral palsy ([facility])    • Constipation    • Disease of thyroid gland    • GERD (gastroesophageal reflux disease)    • Hyperlipidemia    • Osteoporosis    • Parkinson disease ([facility])    • Pre-operative cardiovascular examination    • Raynaud's disease    • Right hemiplegia ([facility])    • Seizures ([facility])      Past Surgical History:   Procedure Laterality Date   • FEMUR IM NAILING/RODDING Right 10/11/2021    Procedure: Right Femur Intramedullary Rodding;  Surgeon: Josse Carrillo MD;  Location: [facility];  Service: Orthopedics;  Laterality: Right;       SLP Recommendation and Plan  Recommend: puree and thin liquid diet. Medications: with puree and a thin liquid wash. Compensations: ensure HOB elevation, full feed, alternate all  "liquids and solids, small bites/sips.     SLP available for further interventions as appropriate pending clinical course. Previous VFSS was completed in March 2020 and recommended puree and thins, however mild risk of aspiration present due to swallow delay and residuals. If difficulties are noted, would consider repeat instrumental.      SWALLOW EVALUATION (last 72 hours)     SLP Adult Swallow Evaluation     Row Name 10/12/21 1100       Rehab Evaluation    Document Type evaluation  -AB    Subjective Information no complaints  -AB    Patient/Family/Caregiver Comments/Observations pt primarily nonverbal, does state yes/no  -AB    Care Plan Review evaluation/treatment results reviewed; risks/benefits reviewed; care plan/treatment goals reviewed; current/potential barriers reviewed; patient/other agree to care plan  -AB    Patient Effort adequate  -AB    Symptoms Noted During/After Treatment none  -AB            General Information    Pertinent History Of Current Problem 65  male [facility] fx nailing. Hx CP - previous VFSS on puree/thins  -AB    Current Method of Nutrition pureed; thin liquids  -AB    Precautions/Limitations, Vision WFL  -AB    Precautions/Limitations, Hearing WFL  -AB    Prior Level of Function-Communication motor speech impairment  -AB    Prior Level of Function-Swallowing puree; thin liquids  -AB    Plans/Goals Discussed with patient  -AB    Barriers to Rehab medically complex; previous functional deficit  -AB    Patient's Goals for Discharge patient did not state  -AB            Pain    Additional Documentation Pain Scale: Word Pre/Post-Treatment (Group)  answers \"yes\" to leg pain  -AB            Oral Motor Structure and Function    Oral Lesions or Structural Abnormalities and/or variants none  -AB    Dentition Assessment natural, present and adequate; teeth are in poor condition  multiple broken teeth  -AB    Secretion Management WNL/WFL  -AB    " Mucosal Quality moist, healthy  -AB    Gag Response --  DNA  -AB    Volitional Swallow WFL  -AB    Volitional Cough weak  -AB            Oral Musculature and Cranial Nerve Assessment    Oral Motor General Assessment generalized oral motor weakness; unable to assess  -AB            Clinical Swallow Eval    Clinical Swallow Evaluation Summary Bedside swallow evaluation completed with pt PM established lunch tray. Pt requires full feed assist from SLP for all consistencies. Initial cough x1 for thin liquids via straw, and is not observed with HOB elevation. Oral phase mildly impaired. Tongue thrust present following puree solids with anterior residuals and interlabial loss. Pt requires thin liquid wash to clear these residuals. No bolus holding or significant oral delay. Pharyngeal phase with no additional s/s aspiration following initial cough, improving with elevation of HOB - mildly difficult to do 2’ R femur fx. Recommend: puree and thin liquid diet. Medications: with puree and a thin liquid wash. Compensations: ensure HOB elevation, full feed, alternate all liquids and solids, small bites/sips. SLP available for further interventions as appropriate pending clinical course. Previous VFSS was completed in March 2020 and recommended puree and thins, however mild risk of aspiration present due to swallow delay and residuals. If difficulties are noted, would consider repeat instrumental.  -AB            Clinical Impression    Daily Summary of Progress (SLP) --  initial eval  -AB    Barriers to Overall Progress (SLP) medically complex, premorbid conditions  -AB    SLP Swallowing Diagnosis mild-moderate; oral dysphagia; R/O pharyngeal dysphagia  -AB    Functional Impact risk of aspiration/pneumonia  -AB    Rehab Potential/Prognosis, Swallowing re-evaluate goals as necessary  -AB    Swallow Criteria for Skilled Therapeutic Interventions Met demonstrates skilled criteria  -AB            Recommendations    Therapy Frequency  (Swallow) PRN  -AB    Predicted Duration Therapy Intervention (Days) until discharge  -AB    SLP Diet Recommendation puree; thin liquids  -AB    Recommended Diagnostics reassess via clinical swallow evaluation; reassess via VFSS (MBS)  -AB    Recommended Precautions and Strategies upright posture during/after eating; small bites of food and sips of liquid; alternate between small bites of food and sips of liquid; check mouth frequently for oral residue/pocketing; general aspiration precautions; 1:1 supervision; assist with feeding  -AB    Oral Care Recommendations Oral Care before breakfast, after meals and PRN  -AB    SLP Rec. for Method of Medication Administration meds whole; meds crushed; with pudding or applesauce  -AB    Monitor for Signs of Aspiration yes; notify SLP if any concerns  -AB    Anticipated Discharge Disposition (SLP) group home  -AB            Swallow Goals (SLP)    Oral Nutrition/Hydration Goal Selection (SLP) oral nutrition/hydration, SLP goal 1  -AB            Oral Nutrition/Hydration Goal 1 (SLP)    Oral Nutrition/Hydration Goal 1, SLP LTG: Tolerate L/R diet without oral stage difficulties or complications associated with aspiration  -AB    Time Frame (Oral Nutrition/Hydration Goal 1, SLP) by discharge  -AB          User Key  (r) = Recorded By, (t) = Taken By, (c) = Cosigned By    Initials Name Effective Dates    AB Lora Kaur, MS CCC-SLP 08/01/21 -                 EDUCATION  The patient has been educated in the following areas:   Dysphagia (Swallowing Impairment) Modified Diet Instruction.        SLP GOALS     Row Name 10/12/21 1100             Oral Nutrition/Hydration Goal 1 (SLP)    Oral Nutrition/Hydration Goal 1, SLP LTG: Tolerate L/R diet without oral stage difficulties or complications associated with aspiration  -AB      Time Frame (Oral Nutrition/Hydration Goal 1, SLP) by discharge  -AB            User Key  (r) = Recorded By, (t) = Taken By, (c) = Cosigned By    Initials Name  Provider Type    Lora Escobar MS CCC-SLP Speech and Language Pathologist              SLP Outcome Measures (last 72 hours)     SLP Outcome Measures     Row Name 10/12/21 1500             SLP Outcome Measures    Outcome Measure Used? Adult NOMS  -AB              Adult FCM Scores    FCM Chosen Swallowing  -AB      Swallowing FCM Score 4  -AB            User Key  (r) = Recorded By, (t) = Taken By, (c) = Cosigned By    Initials Name Effective Dates    AB Lora Kaur MS CCC-SLP 08/01/21 -                  Time Calculation:    Time Calculation- SLP     Row Name 10/12/21 1536             Time Calculation- SLP    SLP Received On 10/12/21  -AB              Untimed Charges    09268-[id number] Eval Oral Pharyng Swallow Minutes 60  -AB              Total Minutes    Untimed Charges Total Minutes 60  -AB       Total Minutes 60  -AB            User Key  (r) = Recorded By, (t) = Taken By, (c) = Cosigned By    Initials Name Provider Type    Lora Escobar MS CCC-SLP Speech and Language Pathologist                Therapy Charges for Today     Code Description Service Date Service Provider Modifiers Qty    [id number] HC ST EVAL ORAL PHARYNG SWALLOW 4 10/12/2021 Lora Kaur MS CCC-SLP GN 1        PPE:  Patient was not wearing a face mask during this therapy encounter. Therapist used appropriate personal protective equipment including mask, eye protection and gloves.  Mask used was standard procedure mask. Appropriate PPE was worn during the entire therapy session. The patient coughed during this evaluation. Therapist was within 6 feet for 15 minutes or more during the evaluation. Hand hygiene was completed before and after therapy session. Patient is not in enhanced droplet precautions.            Lora Kaur MS CCC-SLP  10/12/2021

## 2021-10-12 NOTE — ANESTHESIA POSTPROCEDURE EVALUATION
"Patient: Ned Basilio    Procedure Summary     Date: 10/11/21 Room / Location: [facility] OR [address] [place] [address] MAIN OR    Anesthesia Start: 1645 Anesthesia Stop: 2115    Procedure: Right Femur Intramedullary Rodding (Right Thigh) Diagnosis:     Surgeons: Josse Carrillo MD Provider: Mik Salguero MD    Anesthesia Type: general ASA Status: 4          Anesthesia Type: general    Vitals  Vitals Value Taken Time   BP 86/56 10/11/21 2215   Temp 36.7 °C (98 °F) 10/11/21 2106   Pulse 89 10/11/21 2215   Resp 14 10/11/21 2215   SpO2 99 % 10/11/21 2215           Post Anesthesia Care and Evaluation    Patient location during evaluation: bedside  Patient participation: complete - patient participated  Level of consciousness: awake and alert  Pain management: adequate  Airway patency: patent  Anesthetic complications: No anesthetic complications    Cardiovascular status: acceptable  Respiratory status: acceptable  Hydration status: acceptable    Comments: BP (!) 86/56 (BP Location: Right arm, Patient Position: Lying)   Pulse 89   Temp 36.7 °C (98 °F) (Oral)   Resp 14   Ht 152.4 cm (60\")   Wt 90.5 kg (199 lb 8 oz)   SpO2 99%   BMI 38.96 kg/m²       "

## 2021-10-12 NOTE — PROGRESS NOTES
Name: Ned Basilio ADMIT: 10/10/2021   : 1955  PCP: Seth Villanueva MD    MRN: [id number] LOS: 1 days   AGE/SEX: 65 y.o. male  ROOM: [facility]     Subjective   Subjective      Patient is unreliable historian, nonverbal. BP low so started on IVF. Ortho has ordered PRBC due to intraoperative blood loss.     Review of Systems unable to obtain due to baseline cognitive status.      Objective   Objective   Vital Signs  Temp:  [97.1 °F (36.2 °C)-98.8 °F (37.1 °C)] 98.8 °F (37.1 °C)  Heart Rate:  [] 85  Resp:  [9-24] 12  BP: ()/(52-69) 90/62  SpO2:  [85 %-100 %] 96 %  on  Flow (L/min):  [2-4] 2;   Device (Oxygen Therapy): nasal cannula  Body mass index is 38.96 kg/m².  Physical Exam  Vitals reviewed.   Constitutional:       General: He is not in acute distress.     Appearance: He is well-developed. He is obese. He is not ill-appearing.   HENT:      Head: Normocephalic and atraumatic.   Cardiovascular:      Rate and Rhythm: Normal rate and regular rhythm.   Pulmonary:      Effort: Pulmonary effort is normal. No respiratory distress.      Breath sounds: Normal breath sounds. No wheezing, rhonchi or rales.   Abdominal:      General: Bowel sounds are normal. There is distension.      Palpations: Abdomen is soft. There is no mass.      Tenderness: There is no abdominal tenderness.      Hernia: No hernia is present.      Comments: Protuberant    Musculoskeletal:      Right lower leg: Edema present.      Left lower leg: No edema.      Comments: RLE neurovascular status intact, mild edema post op   Skin:     General: Skin is warm and dry.      Coloration: Skin is pale.   Neurological:      General: No focal deficit present.      Mental Status: He is alert. Mental status is at baseline.      Cranial Nerves: No cranial nerve deficit.   Psychiatric:         Mood and Affect: Mood normal.         Behavior: Behavior normal.      Comments: Smiling and indicating he doesn't like sweet potatoes given to him by  speech therapy         Results Review     I reviewed the patient's new clinical results.  Results from last 7 days   Lab Units 10/12/21  0521 10/10/21  2218   WBC 10*3/mm3 10.49 11.23*   HEMOGLOBIN g/dL 8.3* 13.6   PLATELETS 10*3/mm3 201 245     Results from last 7 days   Lab Units 10/12/21  0521 10/10/21  2218   SODIUM mmol/L 137 140   POTASSIUM mmol/L 4.3 4.1   CHLORIDE mmol/L 103 103   CO2 mmol/L 26.6 29.2*   BUN mg/dL 8 5*   CREATININE mg/dL 0.61* 0.78   GLUCOSE mg/dL 134* 106*   Estimated Creatinine Clearance: 114.6 mL/min (A) (by C-G formula based on SCr of 0.61 mg/dL (L)).  Results from last 7 days   Lab Units 10/10/21  2218   ALBUMIN g/dL 4.10   BILIRUBIN mg/dL 0.2   ALK PHOS U/L 89   AST (SGOT) U/L 18   ALT (SGPT) U/L 17     Results from last 7 days   Lab Units 10/12/21  0521 10/10/21  2218   CALCIUM mg/dL 7.7* 8.8   ALBUMIN g/dL  --  4.10     Results from last 7 days   Lab Units 10/10/21  2218   PROCALCITONIN ng/mL 0.03     COVID19   Date Value Ref Range Status   10/10/2021 Not Detected Not Detected - Ref. Range Final     SARS-CoV-2 PCR   Date Value Ref Range Status   05/14/2021 Not Detected Not Detected Final     Comment:     Nucleic acid specific to SARS-CoV-2 (COVID-19) virus was not detected in  this sample by the Aptima (R) SARS-CoV-2 Assay.                SARS-CoV-2 (COVID-19) nucleic acid testing performed using [employer] Aptima (R) SARS-CoV-2 Assay or [employer] TaqPath (TM)  COVID-19 Combo Kit as indicated above under Emergency Use Authorization (EUA) from the FDA. Aptima (R) and TaqPath (TM) test performance  characteristics verified by [facility] in accordance with the FDAs Guidance Document (Policy for Diagnostic Tests for Coronavirus Disease-2019  during the Public Health Emergency) issued on March 16, 2020. The laboratory is regulated under CLIA as qualified to perform high-complexity testing  Unless otherwise noted, all testing was performed at [facility], [facility]  [id number], [place] State Licensee No. [id number]     No results found for: HGBA1C, POCGLU    XR Hip With or Without Pelvis 2 - 3 View Right  TWO-VIEW PORTABLE RIGHT HIP IN OR     HISTORY: Internal fixation of fracture     Imaging in the operating room was performed at the time of internal  fixation of a fracture of the proximal femoral shaft with long  intramedullary beatrice and intersecting screws and the alignment appears  satisfactory. 6 images were obtained and the fluoroscopy time measures 6  minutes and 30 seconds.     This report was finalized on 10/11/2021 8:48 PM by Dr. Bryce Mitchell M.D.     FL C Arm During Surgery  This procedure was auto-finalized with no dictation required.    Scheduled Medications  baclofen, 10 mg, Oral, BID  bisacodyl, 10 mg, Rectal, Daily  cholecalciferol, 1,000 Units, Oral, BID  dantrolene, 25 mg, Oral, Daily  docusate sodium, 100 mg, Oral, BID  [START ON 10/13/2021] enoxaparin, 30 mg, Subcutaneous, Q24H  fluticasone, 1 spray, Each Nare, Daily  ipratropium-albuterol, 3 mL, Nebulization, TID  isosorbide dinitrate, 20 mg, Oral, Q12H  lamoTRIgine, 100 mg, Oral, BID  levETIRAcetam, 1,000 mg, Oral, BID  levothyroxine, 125 mcg, Oral, QAM  metoprolol tartrate, 25 mg, Oral, Q12H  miconazole, , Topical, BID  montelukast, 10 mg, Oral, Nightly  pantoprazole, 40 mg, Oral, QAM  PARoxetine, 30 mg, Oral, Daily  polyethylene glycol, 17 g, Oral, Daily  senna, 3 tablet, Oral, BID  sodium chloride, 3 mL, Intravenous, Q12H  traZODone, 50 mg, Oral, Nightly  vitamin D, 50,000 Units, Oral, Q7 Days    Infusions  lactated ringers, 9 mL/hr, Last Rate: 9 mL/hr (10/11/21 1635)  lactated ringers, 125 mL/hr, Last Rate: 125 mL/hr (10/12/21 0903)  sodium chloride, 75 mL/hr, Last Rate: 75 mL/hr (10/11/21 0307)    Diet  NPO Diet       Assessment/Plan     Active Hospital Problems    Diagnosis  POA   • **Closed displaced oblique fracture of shaft of right femur (HCC) [S72.331A]  Yes   • ABLA (acute blood loss anemia) [D62]   Unknown   • Sommers's esophagus [K22.70]  Unknown   • Chronic constipation [K59.09]  Yes   • Seizure disorder (HCC) [G40.909]  Yes   • Cerebral palsy (HCC) [G80.9]  Yes   • Parkinson disease (HCC) [G20]  Yes   • Osteoporosis [M81.0]  Yes   • Chronic respiratory failure with hypoxia (HCC) [J96.11]  Yes   • Hypothyroidism [E03.9]  Yes   • Coronary artery disease [I25.10]  Yes      Resolved Hospital Problems    Diagnosis Date Resolved POA   • Leukocytosis [D72.829] 10/12/2021 Yes       65 y.o. male admitted with Closed displaced oblique fracture of shaft of right femur (HCC) after fall at [facility] facility.history of CP, Parkinson, nonverbal, seizure, GERD and chronic respiratory failure on oxygen 2 L/min    · Ortho following. S/p right intramedullary nailing on 10/11.   · ABLA- Hgb declined to 13.6-8.3. PRBC ordered by Dr. Carrillo given blood loss in OR. Repeat lab q 12 and monitor closely. Transfuse if less then 8.   · PT consult.   · Continue bowel regimen. Monitor for post op ileus  · Post procedure hypotension- continue IVF NS at 125. D/w RN- trend BP q 2hours today.   · Chronic hypoxia on 2L supplemental 02-He was cleared by pulmonary.  He is on 2 L oxygen chronically and inhaler therapy but no clear reason.  Imaging on admission with bilateral lobe atelectasis and chronically elevated left hemidiaphragm (possible paralysis) continue  Duonebs TID. Encourage IS. Minimize narcotics post op. Respiratory status stable.     Continue home seizure medications, levothyroxine, PPI,       · start lovenox tmrw per ortho for DVT prophylaxis.  · Full code.  · Discussed with patient, nursing staff and Dr Pritchard.  ·        BUTCH Bernabe  [facility]  10/12/21  09:23 EDT

## 2021-10-12 NOTE — PLAN OF CARE
Goal Outcome Evaluation:  Plan of Care Reviewed With: patient        Progress: no change  Outcome Summary: VSS-bp low but stable with IVF, 1 u PRBC today, pt nonverbal with a few words and whining when in pain, tolerating pureed diet and HTL, SNF referrals sent awaiting response, educated on bp meds and monitoring

## 2021-10-12 NOTE — PLAN OF CARE
Goal Outcome Evaluation:  Plan of Care Reviewed With: patient        Progress: no change  Outcome Summary: Bedside swallow evaluation completed with pt PM established lunch tray. Pt requires full feed assist from SLP for all consistencies. Initial cough x1 for thin liquids via straw, and is not observed with HOB elevation. Oral phase mildly impaired. Tongue thrust present following puree solids with anterior residuals and interlabial loss. Pt requires thin liquid wash to clear these residuals. No bolus holding or significant oral delay. Pharyngeal phase with no additional s/s aspiration following initial cough, improving with elevation of HOB - mildly difficult to do 2’ R femur fx.     Recommend: puree and thin liquid diet. Medications: with puree and a thin liquid wash. Compensations: ensure HOB elevation, full feed, alternate all liquids and solids, small bites/sips.     SLP available for further interventions as appropriate pending clinical course. Previous VFSS was completed in March 2020 and recommended puree and thins, however mild risk of aspiration present due to swallow delay and residuals. If difficulties are noted, would consider repeat instrumental.

## 2021-10-12 NOTE — SIGNIFICANT NOTE
10/12/21 1048   OTHER   Discipline physical therapist   Rehab Time/Intention   Session Not Performed other (see comments)  (pt is not able to participate with PT, at baseline pt is nonverbal, contractions all 4 extremities, cameron lift, no PT indicated.)

## 2021-10-12 NOTE — OP NOTE
Orthopaedic Operative Note    Facility: [facility]    Patient: Ned Basilio     Medical Record Number: [id number]     YOB: 1955     Dictating Surgeon: Josse Carrillo MD       Primary Care Physician: Seth Villanueva MD     Date of Operation: 10/12/2021     PREOPERATIVE DIAGNOSIS: Right proximal femoral shaft fracture.       POSTOPERATIVE DIAGNOSIS: Right proximal femoral shaft fracture.       PROCEDURE PERFORMED: Intramedullary fixation of right proximal femoral shaft fracture      SURGEON: Josse Carrillo MD      ASSISTANT: Josephine Griffith   Assistants were  vital to the success of the procedure which she helped positioning, prepping, draping, retraction, closure and dressings.   ANESTHESIA: General.       SPECIMENS: None.       COMPLICATIONS: None.       ESTIMATED BLOOD LOSS: 100 mL     IMPLANTS:     Implant Name Type Inv. Item Serial No.  Lot No. LRB No. Used Action   NAIL IM FEM T2 [id number] RT - [id number] Implant NAIL IM FEM T2 [id number] RT  ANGY SAMIA [id number] Right 1 Implanted   SCRW LAG RECON 6.5X80MM STRL - [id number] Implant SCRW LAG RECON 6.5X80MM STRL  ANGY SAMIA [id number] Right 1 Implanted   SCRW LAG RECON 6.5X85MM STRL - [id number] Implant SCRW LAG RECON 6.5X85MM STRL  ANGY SAMIA [id number] Right 1 Implanted   SCRW LK T2 ALPHA TI 5X32.5MM STRL - [id number] Implant SCRW LK T2 ALPHA TI 5X32.5MM STRL  ANGY SAMIA [id number] Right 1 Implanted   SCRW LK T2 ALPHA TI 5X45MM STRL - [id number] Implant SCRW LK T2 ALPHA TI 5X45MM STRL  ANGY SAMIA [id number] Right 1 Implanted           INDICATIONS: The patient is a 65 y.o. male who sustained a proximal femur shaft fracture.  Patient has cerebral palsy and is nonweightbearing or ambulatory and nonverbal.  Does have a designated POA.  The risks, benefits, and alternatives to cephalomedullary fixation were discussed with the patient's POA in detail including infection, wound healing problems, hematoma, nonunion, malunion,  failure of fixation, cutout of the compression screw, positioning related injury or neuropraxia, iatrogenic injury to major nerves or blood vessels potentially resulting in permanent dysfunction or deficits, DVT, PE, and anesthesia related complications resulting in death or the need for further surgery. The patient,s POA  acknowledged understanding of information and consented to proceed.     DESCRIPTION OF PROCEDURE: The patient and operative site were identified in the preoperative holding area. The surgical site was marked. Following this, the patient was taken to the operating room and placed in the supine position. Adequate general anesthesia was administered and then patient was repositioned on the operating table on the [place] bed. Timeout was taken. Preoperative antibiotics were administered. Following this, the non-operative leg was placed in a well-padded well leg green. The operative leg was placed into traction.  Patient was noted to have minimal knee flexion contracture we were able to get in fairly straight on the table.  Proximal femoral x-rays did show to be in an externally rotated position given the high visualization of the lesser troch.  I did compare to the contralateral side which showed similar position with the knee Pointed straight up.  So initially continuous rotation proximally and with some traction external rotation and some lateral force about the fracture site were able to get pretty close alignment both in AP and lateral planes.  With this the hip was then prepped and draped in the normal sterile fashion.  I cleaned the extremity with alcohol solution.  We then used DuraPrep x2.  I allowed those to dry for 3 minutes prior to draping the extremity.  Next approximately 3 cm incision was fashioned just proximal to the tip of the greater trochanter and slightly posterior.  I carefully dissected down through the skin and subcutaneous tissues.  Fascia was split in line with the incision.  I  attempted to place a guidepin for piriformis start however given the patient's body habitus I used an awl for my starting point.  Patient did have a fairly small bony anatomy given disuse.  I carefully position the all 4 piriformis starting positioning confirmed this on AP and lateral views and then drove in my starting wire which was found to be in satisfactory position.  The wire was driven down the intramedullary canal to the level of the lesser under fluoroscopic guidance in both AP and lateral planes.  Once at the guidewire in good position I reamed an opening and then placed the ball-tipped guidewire.  As I began passing the ball-tipped guidewire I used some manual pressure laterally and posteriorly to help with fracture alignment however I was not able to pass the wire across the fracture site.  We tried multiple maneuvers with traction internal and external rotation as well as adduction and abduction however after multiple attempts to get proper alignment I chose to make a small opening at the fracture site.  A direct lateral 4 cm incision was then made with a knife sharply to the level of the fracture site.  Carefully dissected through the subcutaneous tissues the fascia was then incised in line with the incision.  Then bluntly dissected down to the fracture site.  The bone quality was not great the femoral shaft was fairly small based on site and feel.  I was able to clear some muscle and hematoma out of the fracture site which I felt was an pending our reduction.  Also I was able to appreciate more of the fracture alignment.  With this I had to externally rotate the leg approximately 45 degrees to help the fracture site TN along with using a bone hook.  At this point we had achieved satisfactory fracture reduction in regards to length alignment and rotation.  I was able to pass the ball-tipped guidewire down the intramedullary canal.  We then measured and determined a 360 mm length nail would be needed.   At this point I think we began sequentially reaming starting with a size 8.  I had some minimal chatter with a size 8 so I jumped to a size 9 reamer where I had appreciated more chatter which I expected given the size of his femoral canal seen on x-ray in the femoral shaft that was directly visualized.  I continue to check AP and lateral planes so that we maintain fracture reduction.  I determined that a size 9 nail would be adequate.  Upon reaming to a size 11 so that we reamed to over the size 11 reamer tip got wedged inside the canal in which I had a back slap it out.  This point I called for a new set of reamers and then began reaming up again starting with a size 10 and half increments to a size 11.  I then called for a size 9 x 360 mm right recon femoral nail.  The nail was put together with the insertion guide on the back table.  Under fluoroscopic visualization I began placing the nail however it was quite a tight fit and as I got to the fracture site the fracture appeared to have some displacement.  This point I backed out the nail and then rereduced the fracture manually and with a bone hook.  Again we checked for proper rotation which aided in this.  I then placed a [place] clamp around the fracture site which was again checked in AP and lateral planes and showed to be reduced.  With this I then chose to ream again with a size 11 and we had no fracture displacement alignment is maintained.  I then again inserted our nail was able to place it past the fracture site and into good position.  I then paid tension proximally for my proximal locking cephalomedullary screws.  Guide was placed to ensure proper alignment and then a small lateral incision was made in the guide was placed through the skin to the bone and the second guide was also placed.  With this I then drove a guidepin in the inferior hole and checked for proper placement.  On the AP we had good position and on the lateral we are slightly posterior  but within acceptable limits and wearing good bone quality.  I then measured off the first guide pin and set my reamer in which I placed that in the more proximal site I reamed under imaging.  I determined a size 80 screw would be needed I then called for that and placed that under fluoroscopic visualization.  We were in good position and did not violate cortical bone on both AP and lateral views.  I then reamed the inferior pole at term a size 85 mm screw be needed.  The screw was then placed under fluoroscopic imaging.  Again we checked multiple views we were within bone throughout.  Screws appear to have good fixation.  I once again checked the fracture site which again showed fracture reduction was maintained.   I then directed my attention to placement of a distal interlock screw using a perfect [place] technique and a small percutaneous incision. The distal interlock screw was drilled, measured, and placed without complication and seemed to get good purchase in the bone. It was confirmed to traverse the nail.  Having completed the placement of the hardware, final images were taken and saved for later review. I was satisfied we had a satisfactory reduction of the fracture. The hardware appeared to be well-positioned.  The cephalomedullary screws were within the femoral head and did not violate the cortex of multiple views taken.  I therefore directed my attention to closure.  The wounds were copiously irrigated with sterile saline. The wounds were sequentially closed in a layered fashion using Vicryl for the deep tissues and staples for the skin. Sterile dressings were applied to wound and drapes withdrawn. The patient was awakened, transferred to a standard bed, and taken to the recovery room. The patient tolerated the procedure well. There were no complications. The patient was taken to the recovery room in good condition.      Josse Carrillo MD    Cc: Seth Villanueva MD  10/12/2021

## 2021-10-12 NOTE — BRIEF OP NOTE
FEMUR INTRAMEDULLARY NAILING/RODDING  Progress Note    Ned Basilio  10/11/2021    Pre-op Diagnosis:   Right Proximal femoral shaft fracture       Post-Op Diagnosis Codes:  Right  Proximal femoral shaft fracture    Procedure/CPT® Codes:        Procedure(s):  Right Femur Intramedullary Rodding    Surgeon(s):  Josse Carrillo MD    Anesthesia: General    Staff:   Circulator: Mariana Prado RN; Evelyn Campos RN  Radiology Technologist: Nain Merrill; Shazia Valencia  Scrub Person: Cyndee Burton; Korin Nelson  Vendor Representative: Sukumar Chun  Orderly: Jose Aj  Nursing Assistant: Rich Graham  Assistant: Mell Marr CSA  Assistant: Mell Marr CSA      Estimated Blood Loss: 100ml    Urine Voided: * No values recorded between 10/11/2021  4:41 PM and 10/11/2021  8:51 PM *    Specimens:                None          Drains:   External Urinary Catheter (Active)   Site Assessment Clean; Skin intact 10/11/21 0816   Application/Removal skin care provided 10/11/21 0816   Collection Container Wall suction 10/11/21 1237   Wall suction (mmHG) 120 mmHG 10/11/21 1237   Securement Method Securing device 10/11/21 1237   Output (mL) 265 mL 10/11/21 1500           Complications: none    Assistant: Mell Marr CSA  was responsible for performing the following activities: Retraction, Suction, Irrigation, Suturing, Closing and Placing Dressing and their skilled assistance was necessary for the success of this case.    Josse Carrillo MD     Date: 10/11/2021  Time: 20:55 EDT

## 2021-10-12 NOTE — PROGRESS NOTES
"Patients Name:  Ned Basilio  YOB: 1955  Medical Records Number:  [id number]    HPI: Patient was seen exam this morning.  He seems to be much better spirits we got a lot of smiles.  Nursing states he seems much more comfortable.  He is nonverbal at baseline pain appears to be controlled and he is tolerating his regular diet..    Exam:  BP 97/56 (BP Location: Right arm, Patient Position: Lying)   Pulse 86   Temp 97.4 °F (36.3 °C) (Oral)   Resp 16   Ht 152.4 cm (60\")   Wt 90.5 kg (199 lb 8 oz)   SpO2 96%   BMI 38.96 kg/m²     Incision: Clean, intact.  Extremity: Some generalized swelling to the right lower extremity but compartments are soft and compressible.  Palpable pulses.  Patient does not move this extremity upon command which seems his baseline.    Lab Results (last 24 hours)     Procedure Component Value Units Date/Time    Vitamin D 25 Hydroxy [[id number]]  (Normal) Collected: 10/12/21 1139    Specimen: Blood Updated: 10/12/21 1250     25 Hydroxy, Vitamin D 44.3 ng/ml     Narrative:      Reference Range for Total Vitamin D 25(OH)     Deficiency <20.0 ng/mL   Insufficiency 21-29 ng/mL   Sufficiency  ng/mL  Toxicity >100 ng/ml    Results may be falsely increased if patient taking Biotin.      Hemoglobin & Hematocrit, Blood [[id number]]  (Abnormal) Collected: 10/12/21 1139    Specimen: Blood Updated: 10/12/21 1203     Hemoglobin 7.9 g/dL      Hematocrit 24.9 %     Basic Metabolic Panel [[id number]]  (Abnormal) Collected: 10/12/21 0521    Specimen: Blood Updated: 10/12/21 0643     Glucose 134 mg/dL      BUN 8 mg/dL      Creatinine 0.61 mg/dL      Sodium 137 mmol/L      Potassium 4.3 mmol/L      Chloride 103 mmol/L      CO2 26.6 mmol/L      Calcium 7.7 mg/dL      eGFR Non African Amer 133 mL/min/1.73      BUN/Creatinine Ratio 13.1     Anion Gap 7.4 mmol/L     Narrative:      GFR Normal >60  Chronic Kidney Disease <60  Kidney Failure <15      CBC & Differential [[id number]]  (Abnormal) " Collected: 10/12/21 0521    Specimen: Blood Updated: 10/12/21 0638    Narrative:      The following orders were created for panel order CBC & Differential.  Procedure                               Abnormality         Status                     ---------                               -----------         ------                     CBC Auto Differential[[id number]]        Abnormal            Final result                 Please view results for these tests on the individual orders.    CBC Auto Differential [[id number]]  (Abnormal) Collected: 10/12/21 0521    Specimen: Blood Updated: 10/12/21 0638     WBC 10.49 10*3/mm3      RBC 2.57 10*6/mm3      Hemoglobin 8.3 g/dL      Hematocrit 24.8 %      MCV 96.5 fL      MCH 32.3 pg      MCHC 33.5 g/dL      RDW 12.1 %      RDW-SD 42.3 fl      MPV 10.2 fL      Platelets 201 10*3/mm3      Neutrophil % 76.6 %      Lymphocyte % 12.5 %      Monocyte % 10.3 %      Eosinophil % 0.0 %      Basophil % 0.1 %      Immature Grans % 0.5 %      Neutrophils, Absolute 8.04 10*3/mm3      Lymphocytes, Absolute 1.31 10*3/mm3      Monocytes, Absolute 1.08 10*3/mm3      Eosinophils, Absolute 0.00 10*3/mm3      Basophils, Absolute 0.01 10*3/mm3      Immature Grans, Absolute 0.05 10*3/mm3      nRBC 0.0 /100 WBC           Hemoglobin 7.9    Plan:   1.  POD# 1 s/p cephalo-medullary fixation of right proximal femoral shaft fracture   2.  Plan transfuse 1 unit and reassess labs in the morning.   3.  Begin DVT prophylaxis Adames morning planning Lovenox low dose daily for 21 doses.  Continue pain control      Please call any questions or concerns thank you    Josse Carrillo MD  10/12/21         full range of motion in all extremities

## 2021-10-13 ENCOUNTER — TELEPHONE (OUTPATIENT)
Dept: GASTROENTEROLOGY | Facility: CLINIC | Age: 66
End: 2021-10-13

## 2021-10-13 LAB
ANION GAP SERPL CALCULATED.3IONS-SCNC: 7.8 MMOL/L (ref 5–15)
BASOPHILS # BLD AUTO: 0.05 10*3/MM3 (ref 0–0.2)
BASOPHILS NFR BLD AUTO: 0.6 % (ref 0–1.5)
BH BB BLOOD EXPIRATION DATE: NORMAL
BH BB BLOOD TYPE BARCODE: NORMAL
BH BB DISPENSE STATUS: NORMAL
BH BB PRODUCT CODE: NORMAL
BH BB UNIT NUMBER: NORMAL
BUN SERPL-MCNC: 7 MG/DL (ref 8–23)
BUN/CREAT SERPL: 13.2 (ref 7–25)
CALCIUM SPEC-SCNC: 7.9 MG/DL (ref 8.6–10.5)
CHLORIDE SERPL-SCNC: 104 MMOL/L (ref 98–107)
CO2 SERPL-SCNC: 28.2 MMOL/L (ref 22–29)
CREAT SERPL-MCNC: 0.53 MG/DL (ref 0.76–1.27)
CROSSMATCH INTERPRETATION: NORMAL
DEPRECATED RDW RBC AUTO: 44.9 FL (ref 37–54)
EOSINOPHIL # BLD AUTO: 0.32 10*3/MM3 (ref 0–0.4)
EOSINOPHIL NFR BLD AUTO: 4.1 % (ref 0.3–6.2)
ERYTHROCYTE [DISTWIDTH] IN BLOOD BY AUTOMATED COUNT: 13 % (ref 12.3–15.4)
GFR SERPL CREATININE-BSD FRML MDRD: >150 ML/MIN/1.73
GLUCOSE SERPL-MCNC: 83 MG/DL (ref 65–99)
HCT VFR BLD AUTO: 22.2 % (ref 37.5–51)
HCT VFR BLD AUTO: 24.7 % (ref 37.5–51)
HGB BLD-MCNC: 7.5 G/DL (ref 13–17.7)
HGB BLD-MCNC: 8.3 G/DL (ref 13–17.7)
IMM GRANULOCYTES # BLD AUTO: 0.04 10*3/MM3 (ref 0–0.05)
IMM GRANULOCYTES NFR BLD AUTO: 0.5 % (ref 0–0.5)
LYMPHOCYTES # BLD AUTO: 2.2 10*3/MM3 (ref 0.7–3.1)
LYMPHOCYTES NFR BLD AUTO: 28.5 % (ref 19.6–45.3)
MCH RBC QN AUTO: 31.7 PG (ref 26.6–33)
MCHC RBC AUTO-ENTMCNC: 33.6 G/DL (ref 31.5–35.7)
MCV RBC AUTO: 94.3 FL (ref 79–97)
MONOCYTES # BLD AUTO: 1.15 10*3/MM3 (ref 0.1–0.9)
MONOCYTES NFR BLD AUTO: 14.9 % (ref 5–12)
NEUTROPHILS NFR BLD AUTO: 3.97 10*3/MM3 (ref 1.7–7)
NEUTROPHILS NFR BLD AUTO: 51.4 % (ref 42.7–76)
NRBC BLD AUTO-RTO: 0 /100 WBC (ref 0–0.2)
PLATELET # BLD AUTO: 155 10*3/MM3 (ref 140–450)
PMV BLD AUTO: 10.1 FL (ref 6–12)
POTASSIUM SERPL-SCNC: 3.7 MMOL/L (ref 3.5–5.2)
RBC # BLD AUTO: 2.62 10*6/MM3 (ref 4.14–5.8)
SODIUM SERPL-SCNC: 140 MMOL/L (ref 136–145)
UNIT  ABO: NORMAL
UNIT  RH: NORMAL
WBC # BLD AUTO: 7.73 10*3/MM3 (ref 3.4–10.8)

## 2021-10-13 PROCEDURE — 36415 COLL VENOUS BLD VENIPUNCTURE: CPT | Performed by: ORTHOPAEDIC SURGERY

## 2021-10-13 PROCEDURE — 85025 COMPLETE CBC W/AUTO DIFF WBC: CPT | Performed by: ORTHOPAEDIC SURGERY

## 2021-10-13 PROCEDURE — 85018 HEMOGLOBIN: CPT | Performed by: NURSE PRACTITIONER

## 2021-10-13 PROCEDURE — 94799 UNLISTED PULMONARY SVC/PX: CPT

## 2021-10-13 PROCEDURE — 85014 HEMATOCRIT: CPT | Performed by: NURSE PRACTITIONER

## 2021-10-13 PROCEDURE — 80048 BASIC METABOLIC PNL TOTAL CA: CPT | Performed by: ORTHOPAEDIC SURGERY

## 2021-10-13 PROCEDURE — 99024 POSTOP FOLLOW-UP VISIT: CPT | Performed by: ORTHOPAEDIC SURGERY

## 2021-10-13 PROCEDURE — 25010000002 ENOXAPARIN PER 10 MG: Performed by: ORTHOPAEDIC SURGERY

## 2021-10-13 RX ADMIN — TRAZODONE HYDROCHLORIDE 50 MG: 50 TABLET ORAL at 20:17

## 2021-10-13 RX ADMIN — PAROXETINE HYDROCHLORIDE 30 MG: 30 TABLET, FILM COATED ORAL at 07:40

## 2021-10-13 RX ADMIN — IPRATROPIUM BROMIDE AND ALBUTEROL SULFATE 3 ML: 2.5; .5 SOLUTION RESPIRATORY (INHALATION) at 16:03

## 2021-10-13 RX ADMIN — SODIUM CHLORIDE, PRESERVATIVE FREE 3 ML: 5 INJECTION INTRAVENOUS at 20:23

## 2021-10-13 RX ADMIN — HYDROCODONE BITARTRATE AND ACETAMINOPHEN 1 TABLET: 7.5; 325 TABLET ORAL at 07:41

## 2021-10-13 RX ADMIN — ENOXAPARIN SODIUM 30 MG: 30 INJECTION SUBCUTANEOUS at 07:41

## 2021-10-13 RX ADMIN — SENNOSIDES 3 TABLET: 8.6 TABLET, FILM COATED ORAL at 20:17

## 2021-10-13 RX ADMIN — Medication 1000 UNITS: at 20:18

## 2021-10-13 RX ADMIN — LAMOTRIGINE 100 MG: 100 TABLET ORAL at 07:40

## 2021-10-13 RX ADMIN — BACLOFEN 10 MG: 10 TABLET ORAL at 20:18

## 2021-10-13 RX ADMIN — PANTOPRAZOLE SODIUM 40 MG: 40 TABLET, DELAYED RELEASE ORAL at 05:38

## 2021-10-13 RX ADMIN — LEVETIRACETAM 1000 MG: 500 TABLET, FILM COATED ORAL at 07:39

## 2021-10-13 RX ADMIN — SODIUM CHLORIDE, POTASSIUM CHLORIDE, SODIUM LACTATE AND CALCIUM CHLORIDE 125 ML/HR: 600; 310; 30; 20 INJECTION, SOLUTION INTRAVENOUS at 13:48

## 2021-10-13 RX ADMIN — HYDROCODONE BITARTRATE AND ACETAMINOPHEN 1 TABLET: 7.5; 325 TABLET ORAL at 03:30

## 2021-10-13 RX ADMIN — DOCUSATE SODIUM 100 MG: 100 CAPSULE, LIQUID FILLED ORAL at 20:22

## 2021-10-13 RX ADMIN — Medication 1000 UNITS: at 07:41

## 2021-10-13 RX ADMIN — BISACODYL 10 MG: 10 SUPPOSITORY RECTAL at 07:39

## 2021-10-13 RX ADMIN — LEVETIRACETAM 1000 MG: 500 TABLET, FILM COATED ORAL at 20:17

## 2021-10-13 RX ADMIN — PANTOPRAZOLE SODIUM 40 MG: 40 TABLET, DELAYED RELEASE ORAL at 07:40

## 2021-10-13 RX ADMIN — MONTELUKAST SODIUM 10 MG: 10 TABLET, FILM COATED ORAL at 20:19

## 2021-10-13 RX ADMIN — LAMOTRIGINE 100 MG: 100 TABLET ORAL at 20:19

## 2021-10-13 RX ADMIN — SODIUM CHLORIDE, POTASSIUM CHLORIDE, SODIUM LACTATE AND CALCIUM CHLORIDE 125 ML/HR: 600; 310; 30; 20 INJECTION, SOLUTION INTRAVENOUS at 23:57

## 2021-10-13 RX ADMIN — IPRATROPIUM BROMIDE AND ALBUTEROL SULFATE 3 ML: 2.5; .5 SOLUTION RESPIRATORY (INHALATION) at 21:18

## 2021-10-13 RX ADMIN — LEVOTHYROXINE SODIUM 125 MCG: 0.12 TABLET ORAL at 05:38

## 2021-10-13 RX ADMIN — IPRATROPIUM BROMIDE AND ALBUTEROL SULFATE 3 ML: 2.5; .5 SOLUTION RESPIRATORY (INHALATION) at 09:08

## 2021-10-13 RX ADMIN — SENNOSIDES 3 TABLET: 8.6 TABLET, FILM COATED ORAL at 07:40

## 2021-10-13 RX ADMIN — HYDROCODONE BITARTRATE AND ACETAMINOPHEN 1 TABLET: 7.5; 325 TABLET ORAL at 14:45

## 2021-10-13 RX ADMIN — FLUTICASONE PROPIONATE 1 SPRAY: 50 SPRAY, METERED NASAL at 07:41

## 2021-10-13 RX ADMIN — BACLOFEN 10 MG: 10 TABLET ORAL at 07:39

## 2021-10-13 RX ADMIN — DANTROLENE SODIUM 25 MG: 25 CAPSULE ORAL at 07:40

## 2021-10-13 RX ADMIN — ISOSORBIDE DINITRATE 20 MG: 20 TABLET ORAL at 03:24

## 2021-10-13 RX ADMIN — POLYETHYLENE GLYCOL 3350 17 G: 17 POWDER, FOR SOLUTION ORAL at 07:39

## 2021-10-13 RX ADMIN — DOCUSATE SODIUM 100 MG: 100 CAPSULE, LIQUID FILLED ORAL at 07:39

## 2021-10-13 RX ADMIN — HYDROCODONE BITARTRATE AND ACETAMINOPHEN 1 TABLET: 7.5; 325 TABLET ORAL at 18:49

## 2021-10-13 RX ADMIN — METOPROLOL TARTRATE 25 MG: 25 TABLET, FILM COATED ORAL at 20:22

## 2021-10-13 NOTE — PLAN OF CARE
Goal Outcome Evaluation:  Plan of Care Reviewed With: patient        Progress: improving  Outcome Summary: VSS-bp low but stable, dressing c/d/i, all 4 extremities contracted at baseline d/t CP, pt mostly nonverbal, tolerating jeannette pain meds, meds crushed in pudding, voiding per brief/wrap, unable to educate d/t cognition, CTM

## 2021-10-13 NOTE — PROGRESS NOTES
"DAILY PROGRESS NOTE  [facility]    Patient Identification:  Name: Ned Basilio  Age: 65 y.o.  Sex: male  :  1955  MRN: [id number]         Primary Care Physician: Seth Villanueva MD    Subjective:  Interval History:No complaints.    Objective:    Scheduled Meds:baclofen, 10 mg, Oral, BID  bisacodyl, 10 mg, Rectal, Daily  cholecalciferol, 1,000 Units, Oral, BID  dantrolene, 25 mg, Oral, Daily  docusate sodium, 100 mg, Oral, BID  enoxaparin, 30 mg, Subcutaneous, Q24H  fluticasone, 1 spray, Each Nare, Daily  ipratropium-albuterol, 3 mL, Nebulization, TID  isosorbide dinitrate, 20 mg, Oral, Q12H  lamoTRIgine, 100 mg, Oral, BID  levETIRAcetam, 1,000 mg, Oral, BID  levothyroxine, 125 mcg, Oral, QAM  metoprolol tartrate, 25 mg, Oral, Q12H  miconazole, , Topical, BID  montelukast, 10 mg, Oral, Nightly  pantoprazole, 40 mg, Oral, QAM  PARoxetine, 30 mg, Oral, Daily  polyethylene glycol, 17 g, Oral, Daily  senna, 3 tablet, Oral, BID  sodium chloride, 3 mL, Intravenous, Q12H  traZODone, 50 mg, Oral, Nightly  vitamin D, 50,000 Units, Oral, Q7 Days      Continuous Infusions:lactated ringers, 125 mL/hr, Last Rate: 125 mL/hr (10/12/21 0903)        Vital signs in last 24 hours:  Temp:  [96.8 °F (36 °C)-98.3 °F (36.8 °C)] 98.2 °F (36.8 °C)  Heart Rate:  [] 102  Resp:  [14-18] 16  BP: ()/(54-66) 106/61    Intake/Output:    Intake/Output Summary (Last 24 hours) at 10/13/2021 1310  Last data filed at 10/13/2021 0632  Gross per 24 hour   Intake 1562 ml   Output 850 ml   Net 712 ml       Exam:  /61 (BP Location: Right arm, Patient Position: Lying)   Pulse 102   Temp 98.2 °F (36.8 °C) (Skin)   Resp 16   Ht 152.4 cm (60\")   Wt 90.5 kg (199 lb 8 oz)   SpO2 94%   BMI 38.96 kg/m²     General Appearance:    Alert, cooperative, no distress   Head:    Normocephalic, without obvious abnormality, atraumatic   Eyes:       Throat:   Lips, tongue, gums normal   Neck:   Supple, symmetrical, " trachea midline, no JVD   Lungs:     Clear to auscultation bilaterally, respirations unlabored   Chest Wall:    No tenderness or deformity    Heart:    Regular rate and rhythm, S1 and S2 normal, no murmur,no  Rub or gallop   Abdomen:     Soft, nontender, bowel sounds active, no masses, no organomegaly    Extremities:   Extremities normal, right thigh surgical changes, no cyanosis or edema   Pulses:      Skin:   Skin is warm and dry,  no rashes or palpable lesions   Neurologic:   Has CP      Lab Results (last 72 hours)     Procedure Component Value Units Date/Time    Basic Metabolic Panel [[id number]]  (Abnormal) Collected: 10/13/21 0710    Specimen: Blood Updated: 10/13/21 0832     Glucose 83 mg/dL      BUN 7 mg/dL      Creatinine 0.53 mg/dL      Sodium 140 mmol/L      Potassium 3.7 mmol/L      Chloride 104 mmol/L      CO2 28.2 mmol/L      Calcium 7.9 mg/dL      eGFR Non African Amer >150 mL/min/1.73      BUN/Creatinine Ratio 13.2     Anion Gap 7.8 mmol/L     Narrative:      GFR Normal >60  Chronic Kidney Disease <60  Kidney Failure <15      CBC & Differential [[id number]]  (Abnormal) Collected: 10/13/21 0710    Specimen: Blood Updated: 10/13/21 0806    Narrative:      The following orders were created for panel order CBC & Differential.  Procedure                               Abnormality         Status                     ---------                               -----------         ------                     CBC Auto Differential[[id number]]        Abnormal            Final result                 Please view results for these tests on the individual orders.    CBC Auto Differential [[id number]]  (Abnormal) Collected: 10/13/21 0710    Specimen: Blood Updated: 10/13/21 0806     WBC 7.73 10*3/mm3      RBC 2.62 10*6/mm3      Hemoglobin 8.3 g/dL      Hematocrit 24.7 %      MCV 94.3 fL      MCH 31.7 pg      MCHC 33.6 g/dL      RDW 13.0 %      RDW-SD 44.9 fl      MPV 10.1 fL      Platelets 155 10*3/mm3      Neutrophil % 51.4  %      Lymphocyte % 28.5 %      Monocyte % 14.9 %      Eosinophil % 4.1 %      Basophil % 0.6 %      Immature Grans % 0.5 %      Neutrophils, Absolute 3.97 10*3/mm3      Lymphocytes, Absolute 2.20 10*3/mm3      Monocytes, Absolute 1.15 10*3/mm3      Eosinophils, Absolute 0.32 10*3/mm3      Basophils, Absolute 0.05 10*3/mm3      Immature Grans, Absolute 0.04 10*3/mm3      nRBC 0.0 /100 WBC     Vitamin D 25 Hydroxy [[id number]]  (Normal) Collected: 10/12/21 1139    Specimen: Blood Updated: 10/12/21 1250     25 Hydroxy, Vitamin D 44.3 ng/ml     Narrative:      Reference Range for Total Vitamin D 25(OH)     Deficiency <20.0 ng/mL   Insufficiency 21-29 ng/mL   Sufficiency  ng/mL  Toxicity >100 ng/ml    Results may be falsely increased if patient taking Biotin.      Hemoglobin & Hematocrit, Blood [[id number]]  (Abnormal) Collected: 10/12/21 1139    Specimen: Blood Updated: 10/12/21 1203     Hemoglobin 7.9 g/dL      Hematocrit 24.9 %     Basic Metabolic Panel [[id number]]  (Abnormal) Collected: 10/12/21 0521    Specimen: Blood Updated: 10/12/21 0643     Glucose 134 mg/dL      BUN 8 mg/dL      Creatinine 0.61 mg/dL      Sodium 137 mmol/L      Potassium 4.3 mmol/L      Chloride 103 mmol/L      CO2 26.6 mmol/L      Calcium 7.7 mg/dL      eGFR Non African Amer 133 mL/min/1.73      BUN/Creatinine Ratio 13.1     Anion Gap 7.4 mmol/L     Narrative:      GFR Normal >60  Chronic Kidney Disease <60  Kidney Failure <15      CBC & Differential [[id number]]  (Abnormal) Collected: 10/12/21 0521    Specimen: Blood Updated: 10/12/21 0638    Narrative:      The following orders were created for panel order CBC & Differential.  Procedure                               Abnormality         Status                     ---------                               -----------         ------                     CBC Auto Differential[[id number]]        Abnormal            Final result                 Please view results for these tests on the individual  orders.    CBC Auto Differential [[id number]]  (Abnormal) Collected: 10/12/21 0521    Specimen: Blood Updated: 10/12/21 0638     WBC 10.49 10*3/mm3      RBC 2.57 10*6/mm3      Hemoglobin 8.3 g/dL      Hematocrit 24.8 %      MCV 96.5 fL      MCH 32.3 pg      MCHC 33.5 g/dL      RDW 12.1 %      RDW-SD 42.3 fl      MPV 10.2 fL      Platelets 201 10*3/mm3      Neutrophil % 76.6 %      Lymphocyte % 12.5 %      Monocyte % 10.3 %      Eosinophil % 0.0 %      Basophil % 0.1 %      Immature Grans % 0.5 %      Neutrophils, Absolute 8.04 10*3/mm3      Lymphocytes, Absolute 1.31 10*3/mm3      Monocytes, Absolute 1.08 10*3/mm3      Eosinophils, Absolute 0.00 10*3/mm3      Basophils, Absolute 0.01 10*3/mm3      Immature Grans, Absolute 0.05 10*3/mm3      nRBC 0.0 /100 WBC     COVID PRE-OP / PRE-PROCEDURE SCREENING ORDER (NO ISOLATION) - Swab, Nasopharynx [[id number]]  (Normal) Collected: 10/10/21 2326    Specimen: Swab from Nasopharynx Updated: 10/11/21 0222    Narrative:      The following orders were created for panel order COVID PRE-OP / PRE-PROCEDURE SCREENING ORDER (NO ISOLATION) - Swab, Nasopharynx.  Procedure                               Abnormality         Status                     ---------                               -----------         ------                     COVID-19, [facility] IN-HOUSE...[[id number]]  Normal              Final result                 Please view results for these tests on the individual orders.    COVID-19,[facility] IN-HOUSE CEPHEID/KATHRYN NP SWAB IN TRANSPORT MEDIA 8-12 HR TAT - Swab, Nasopharynx [[id number]]  (Normal) Collected: 10/10/21 2326    Specimen: Swab from Nasopharynx Updated: 10/11/21 0222     COVID19 Not Detected    Narrative:      Fact sheet for providers: https://www.fda.gov/media/639393/download     Fact sheet for patients: https://www.fda.gov/media/096973/download    Procalcitonin [[id number]]  (Normal) Collected: 10/10/21 7653    Specimen: Blood Updated: 10/11/21 1776     Procalcitonin 0.03  "ng/mL     Narrative:      As a Marker for Sepsis (Non-Neonates):     1. <0.5 ng/mL represents a low risk of severe sepsis and/or septic shock.  2. >2 ng/mL represents a high risk of severe sepsis and/or septic shock.    As a Marker for Lower Respiratory Tract Infections that require antibiotic therapy:  PCT on Admission     Antibiotic Therapy             6-12 Hrs later  >0.5                          Strongly Recommended            >0.25 - <0.5             Recommended  0.1 - 0.25                  Discouraged                       Remeasure/reassess PCT  <0.1                         Strongly Discouraged         Remeasure/reassess PCT      As 28 day mortality risk marker: \"Change in Procalcitonin Result\" (>80% or <=80%) if Day 0 (or Day 1) and Day 4 values are available. Refer to http://www.[employer]ct-calculator.com/    Change in PCT <=80 %   A decrease of PCT levels below or equal to 80% defines a positive change in PCT test result representing a higher risk for 28-day all-cause mortality of patients diagnosed with severe sepsis or septic shock.    Change in PCT >80 %   A decrease of PCT levels of more than 80% defines a negative change in PCT result representing a lower risk for 28-day all-cause mortality of patients diagnosed with severe sepsis or septic shock.              Results may be falsely decreased if patient taking Biotin.     Protime-INR [[id number]]  (Normal) Collected: 10/10/21 2218    Specimen: Blood Updated: 10/10/21 2257     Protime 13.1 Seconds      INR 1.01    Comprehensive Metabolic Panel [[id number]]  (Abnormal) Collected: 10/10/21 2218    Specimen: Blood Updated: 10/10/21 2253     Glucose 106 mg/dL      BUN 5 mg/dL      Creatinine 0.78 mg/dL      Sodium 140 mmol/L      Potassium 4.1 mmol/L      Chloride 103 mmol/L      CO2 29.2 mmol/L      Calcium 8.8 mg/dL      Total Protein 6.9 g/dL      Albumin 4.10 g/dL      ALT (SGPT) 17 U/L      AST (SGOT) 18 U/L      Alkaline Phosphatase 89 U/L      Total " Bilirubin 0.2 mg/dL      eGFR Non African Amer 100 mL/min/1.73      Globulin 2.8 gm/dL      A/G Ratio 1.5 g/dL      BUN/Creatinine Ratio 6.4     Anion Gap 7.8 mmol/L     Narrative:      GFR Normal >60  Chronic Kidney Disease <60  Kidney Failure <15      CBC & Differential [[id number]]  (Abnormal) Collected: 10/10/21 2218    Specimen: Blood Updated: 10/10/21 2242    Narrative:      The following orders were created for panel order CBC & Differential.  Procedure                               Abnormality         Status                     ---------                               -----------         ------                     CBC Auto Differential[[id number]]        Abnormal            Final result                 Please view results for these tests on the individual orders.    CBC Auto Differential [[id number]]  (Abnormal) Collected: 10/10/21 2218    Specimen: Blood Updated: 10/10/21 2242     WBC 11.23 10*3/mm3      RBC 4.24 10*6/mm3      Hemoglobin 13.6 g/dL      Hematocrit 42.2 %      MCV 99.5 fL      MCH 32.1 pg      MCHC 32.2 g/dL      RDW 12.6 %      RDW-SD 46.9 fl      MPV 10.2 fL      Platelets 245 10*3/mm3      Neutrophil % 74.3 %      Lymphocyte % 15.2 %      Monocyte % 7.8 %      Eosinophil % 2.0 %      Basophil % 0.4 %      Immature Grans % 0.3 %      Neutrophils, Absolute 8.33 10*3/mm3      Lymphocytes, Absolute 1.71 10*3/mm3      Monocytes, Absolute 0.88 10*3/mm3      Eosinophils, Absolute 0.23 10*3/mm3      Basophils, Absolute 0.05 10*3/mm3      Immature Grans, Absolute 0.03 10*3/mm3      nRBC 0.0 /100 WBC         Data Review:  Results from last 7 days   Lab Units 10/13/21  0710 10/12/21  0521 10/12/21  0521 10/10/21  2218 10/10/21  2218   SODIUM mmol/L 140  --  137  --  140   POTASSIUM mmol/L 3.7  --  4.3  --  4.1   CHLORIDE mmol/L 104  --  103  --  103   CO2 mmol/L 28.2  --  26.6  --  29.2*   BUN mg/dL 7*  --  8  --  5*   CREATININE mg/dL 0.53*  --  0.61*  --  0.78   GLUCOSE mg/dL 83   < > 134*   < > 106*    CALCIUM mg/dL 7.9*  --  7.7*  --  8.8    < > = values in this interval not displayed.     Results from last 7 days   Lab Units 10/13/21  0710 10/12/21  1139 10/12/21  0521 10/10/21  2218 10/10/21  2218   WBC 10*3/mm3 7.73  --  10.49  --  11.23*   HEMOGLOBIN g/dL 8.3* 7.9* 8.3*   < > 13.6   HEMATOCRIT % 24.7* 24.9* 24.8*   < > 42.2   PLATELETS 10*3/mm3 155  --  201  --  245    < > = values in this interval not displayed.             No results found for: TROPONINT      Results from last 7 days   Lab Units 10/10/21  2218   ALK PHOS U/L 89   BILIRUBIN mg/dL 0.2   ALT (SGPT) U/L 17   AST (SGOT) U/L 18             No results found for: POCGLU  Results from last 7 days   Lab Units 10/10/21  2218   INR  1.01       Past Medical History:   Diagnosis Date   • Anxiety    • Arthritis    • Sommers's esophagus    • Cardiomegaly    • Cerebral palsy (HCC)    • Constipation    • Disease of thyroid gland    • GERD (gastroesophageal reflux disease)    • Hyperlipidemia    • Osteoporosis    • Parkinson disease (HCC)    • Pre-operative cardiovascular examination    • Raynaud's disease    • Right hemiplegia (HCC)    • Seizures (HCC)        Assessment:  Active Hospital Problems    Diagnosis  POA   • **Closed displaced oblique fracture of shaft of right femur (HCC) [S72.331A]  Yes   • ABLA (acute blood loss anemia) [D62]  Unknown   • Sommers's esophagus [K22.70]  Unknown   • Chronic constipation [K59.09]  Yes   • Seizure disorder (HCC) [G40.909]  Yes   • Cerebral palsy (HCC) [G80.9]  Yes   • Parkinson disease (HCC) [G20]  Yes   • Osteoporosis [M81.0]  Yes   • Chronic respiratory failure with hypoxia (HCC) [J96.11]  Yes   • Hypothyroidism [E03.9]  Yes   • Coronary artery disease [I25.10]  Yes      Resolved Hospital Problems    Diagnosis Date Resolved POA   • Leukocytosis [D72.829] 10/12/2021 Yes       Plan:  Post op care.  Pain control. Will need SNU for rehab.    Sukh Martinez MD  10/13/2021  13:10 EDT

## 2021-10-13 NOTE — PROGRESS NOTES
"Patients Name:  Ned Basilio  YOB: 1955  Medical Records Number:  [id number]    HPI: Patient was seen exam this morning.  Nursing states he rested well overnight pain controlled.  He is nonverbal at baseline pain appears to be controlled and he is tolerating his regular diet..    Exam:  /63 (BP Location: Right arm, Patient Position: Lying)   Pulse 94   Temp 97.7 °F (36.5 °C) (Oral)   Resp 16   Ht 152.4 cm (60\")   Wt 90.5 kg (199 lb 8 oz)   SpO2 93%   BMI 38.96 kg/m²     Incision: Clean, intact.  Extremity: Some generalized swelling to the right lower extremity but compartments are soft and compressible.  Palpable pulses.  Patient does not move this extremity upon command which seems his baseline.    Lab Results (last 24 hours)     Procedure Component Value Units Date/Time    Vitamin D 25 Hydroxy [[id number]]  (Normal) Collected: 10/12/21 1139    Specimen: Blood Updated: 10/12/21 1250     25 Hydroxy, Vitamin D 44.3 ng/ml     Narrative:      Reference Range for Total Vitamin D 25(OH)     Deficiency <20.0 ng/mL   Insufficiency 21-29 ng/mL   Sufficiency  ng/mL  Toxicity >100 ng/ml    Results may be falsely increased if patient taking Biotin.      Hemoglobin & Hematocrit, Blood [[id number]]  (Abnormal) Collected: 10/12/21 1139    Specimen: Blood Updated: 10/12/21 1203     Hemoglobin 7.9 g/dL      Hematocrit 24.9 %           Morning labs pending    Plan:   1.  POD# 2 s/p cephalo-medullary fixation of right proximal femoral shaft fracture   2.  Patient transfused yesterday awaiting labs will need to follow-up.   3.   DVT prophylaxis  planning Lovenox low dose daily for 21 doses.  Continue pain control      Please call any questions or concerns thank you    Josse Carrillo MD  10/13/21      "

## 2021-10-13 NOTE — PLAN OF CARE
Goal Outcome Evaluation:  Plan of Care Reviewed With: patient        Progress: improving  Outcome Summary: POD 2 of right hip IM Nailing. Had a stable night. Dressing dry and intact. Voiding per purewick. Nonverbal/ baseline r/t cerebral palsy. Pain controlled with pain meds. Tolerating IV fluids. Takes meds crushed in pudding. BP better with iv fluids. Unable to educate patient due to mental retardation. Will continue to monitor.

## 2021-10-13 NOTE — CASE MANAGEMENT/SOCIAL WORK
Continued Stay Note  [facility]     Patient Name: Ned Basilio  MRN: [id number]  Today's Date: 10/13/2021    Admit Date: 10/10/2021     Discharge Plan     Row Name 10/13/21 1608       Plan    Plan [facility] -- Referral Pending.    Patient/Family in Agreement with Plan yes    Plan Comments Spoke with Deanne/Letha and Renée/Susanne who are unable to accept the pt at this time. Updated the pt's guardian/Chanell Valencia who's agreeable. CMS Compare SNF List was reviewed again and she requests [facility]. Referral sent in Epic. Spoke with Orly/Mario who will look into the patient's case.               Discharge Codes    No documentation.                     Sruthi Lopez RN

## 2021-10-13 NOTE — TELEPHONE ENCOUNTER
Jonathon called from the agency where the resides.  She said the patient was hospitalized with a femur fracture.  Because of the agency regulations she is asking if we can send a letter stating he should not start the medication until he is released.  I asked her why she would need that as he likely needs the rx even though hospitalized.  She said if not, they can call the hospital and have them give him the medication.    [phone]  Please advise.  Faxed note that patient can hold [facility] until discharged from the hospital. shireen

## 2021-10-14 LAB
ANION GAP SERPL CALCULATED.3IONS-SCNC: 6.2 MMOL/L (ref 5–15)
BUN SERPL-MCNC: 6 MG/DL (ref 8–23)
BUN/CREAT SERPL: 12.8 (ref 7–25)
CALCIUM SPEC-SCNC: 8 MG/DL (ref 8.6–10.5)
CHLORIDE SERPL-SCNC: 108 MMOL/L (ref 98–107)
CO2 SERPL-SCNC: 28.8 MMOL/L (ref 22–29)
CREAT SERPL-MCNC: 0.47 MG/DL (ref 0.76–1.27)
GFR SERPL CREATININE-BSD FRML MDRD: >150 ML/MIN/1.73
GLUCOSE SERPL-MCNC: 92 MG/DL (ref 65–99)
HCT VFR BLD AUTO: 21.4 % (ref 37.5–51)
HCT VFR BLD AUTO: 28.4 % (ref 37.5–51)
HGB BLD-MCNC: 7.1 G/DL (ref 13–17.7)
HGB BLD-MCNC: 9.7 G/DL (ref 13–17.7)
POTASSIUM SERPL-SCNC: 3.8 MMOL/L (ref 3.5–5.2)
SODIUM SERPL-SCNC: 143 MMOL/L (ref 136–145)

## 2021-10-14 PROCEDURE — 85014 HEMATOCRIT: CPT | Performed by: NURSE PRACTITIONER

## 2021-10-14 PROCEDURE — 94799 UNLISTED PULMONARY SVC/PX: CPT

## 2021-10-14 PROCEDURE — 25010000002 ENOXAPARIN PER 10 MG: Performed by: ORTHOPAEDIC SURGERY

## 2021-10-14 PROCEDURE — 86900 BLOOD TYPING SEROLOGIC ABO: CPT

## 2021-10-14 PROCEDURE — P9016 RBC LEUKOCYTES REDUCED: HCPCS

## 2021-10-14 PROCEDURE — 85018 HEMOGLOBIN: CPT | Performed by: NURSE PRACTITIONER

## 2021-10-14 PROCEDURE — 80048 BASIC METABOLIC PNL TOTAL CA: CPT | Performed by: ORTHOPAEDIC SURGERY

## 2021-10-14 PROCEDURE — 36430 TRANSFUSION BLD/BLD COMPNT: CPT

## 2021-10-14 RX ADMIN — ISOSORBIDE DINITRATE 20 MG: 20 TABLET ORAL at 02:36

## 2021-10-14 RX ADMIN — Medication 1000 UNITS: at 20:29

## 2021-10-14 RX ADMIN — MONTELUKAST SODIUM 10 MG: 10 TABLET, FILM COATED ORAL at 20:29

## 2021-10-14 RX ADMIN — LEVETIRACETAM 1000 MG: 500 TABLET, FILM COATED ORAL at 09:42

## 2021-10-14 RX ADMIN — BACLOFEN 10 MG: 10 TABLET ORAL at 20:29

## 2021-10-14 RX ADMIN — IPRATROPIUM BROMIDE AND ALBUTEROL SULFATE 3 ML: 2.5; .5 SOLUTION RESPIRATORY (INHALATION) at 10:04

## 2021-10-14 RX ADMIN — METOPROLOL TARTRATE 25 MG: 25 TABLET, FILM COATED ORAL at 09:43

## 2021-10-14 RX ADMIN — HYDROCODONE BITARTRATE AND ACETAMINOPHEN 1 TABLET: 7.5; 325 TABLET ORAL at 09:43

## 2021-10-14 RX ADMIN — FLUTICASONE PROPIONATE 1 SPRAY: 50 SPRAY, METERED NASAL at 09:43

## 2021-10-14 RX ADMIN — SENNOSIDES 3 TABLET: 8.6 TABLET, FILM COATED ORAL at 20:28

## 2021-10-14 RX ADMIN — LAMOTRIGINE 100 MG: 100 TABLET ORAL at 12:54

## 2021-10-14 RX ADMIN — HYDROCODONE BITARTRATE AND ACETAMINOPHEN 1 TABLET: 7.5; 325 TABLET ORAL at 02:36

## 2021-10-14 RX ADMIN — BISACODYL 10 MG: 10 SUPPOSITORY RECTAL at 09:42

## 2021-10-14 RX ADMIN — SODIUM CHLORIDE, POTASSIUM CHLORIDE, SODIUM LACTATE AND CALCIUM CHLORIDE 125 ML/HR: 600; 310; 30; 20 INJECTION, SOLUTION INTRAVENOUS at 17:54

## 2021-10-14 RX ADMIN — TRAZODONE HYDROCHLORIDE 50 MG: 50 TABLET ORAL at 20:28

## 2021-10-14 RX ADMIN — POLYETHYLENE GLYCOL 3350 17 G: 17 POWDER, FOR SOLUTION ORAL at 09:42

## 2021-10-14 RX ADMIN — LAMOTRIGINE 100 MG: 100 TABLET ORAL at 20:35

## 2021-10-14 RX ADMIN — PAROXETINE HYDROCHLORIDE 30 MG: 30 TABLET, FILM COATED ORAL at 09:43

## 2021-10-14 RX ADMIN — LEVETIRACETAM 1000 MG: 500 TABLET, FILM COATED ORAL at 20:35

## 2021-10-14 RX ADMIN — DOCUSATE SODIUM 100 MG: 100 CAPSULE, LIQUID FILLED ORAL at 20:29

## 2021-10-14 RX ADMIN — MICONAZOLE NITRATE: 2 POWDER TOPICAL at 20:29

## 2021-10-14 RX ADMIN — METOPROLOL TARTRATE 25 MG: 25 TABLET, FILM COATED ORAL at 20:28

## 2021-10-14 RX ADMIN — DANTROLENE SODIUM 25 MG: 25 CAPSULE ORAL at 09:43

## 2021-10-14 RX ADMIN — ENOXAPARIN SODIUM 30 MG: 30 INJECTION SUBCUTANEOUS at 09:42

## 2021-10-14 RX ADMIN — LEVOTHYROXINE SODIUM 125 MCG: 0.12 TABLET ORAL at 06:31

## 2021-10-14 RX ADMIN — SODIUM CHLORIDE, PRESERVATIVE FREE 3 ML: 5 INJECTION INTRAVENOUS at 09:51

## 2021-10-14 RX ADMIN — Medication 1000 UNITS: at 09:43

## 2021-10-14 RX ADMIN — ALBUTEROL SULFATE 2.5 MG: 2.5 SOLUTION RESPIRATORY (INHALATION) at 03:02

## 2021-10-14 RX ADMIN — IPRATROPIUM BROMIDE AND ALBUTEROL SULFATE 3 ML: 2.5; .5 SOLUTION RESPIRATORY (INHALATION) at 20:17

## 2021-10-14 RX ADMIN — MICONAZOLE NITRATE: 2 POWDER TOPICAL at 09:51

## 2021-10-14 RX ADMIN — BACLOFEN 10 MG: 10 TABLET ORAL at 09:43

## 2021-10-14 RX ADMIN — SENNOSIDES 3 TABLET: 8.6 TABLET, FILM COATED ORAL at 09:43

## 2021-10-14 RX ADMIN — PANTOPRAZOLE SODIUM 40 MG: 40 TABLET, DELAYED RELEASE ORAL at 06:31

## 2021-10-14 NOTE — CASE MANAGEMENT/SOCIAL WORK
Continued Stay Note  [facility]     Patient Name: Ned Basilio  MRN: [id number]  Today's Date: 10/14/2021    Admit Date: 10/10/2021     Discharge Plan     Row Name 10/14/21 1237       Plan    Plan SNF -- Referrals Pending.    Patient/Family in Agreement with Plan yes    Plan Comments Spoke with Orly/Mario who is unable to accept at this time. Updated the patient's guardian/Chanell who's agreeable. CMS Compare SNF List was reviewed in detail again, and she requests [facility], [facility], [facility] Referrals sent in [facility]. Spoke with Christophe/Jer[facility], Oksana/[facility] and Di/Wanda all of whom will look into the patient's case and f/u with [facility].               Discharge Codes    No documentation.                     Sruthi Lopez RN

## 2021-10-14 NOTE — PLAN OF CARE
Goal Outcome Evaluation:           Progress: no change  Outcome Summary: POD #3. VSS. PRN pain meds working well to control pain - pt doesn't seem to be in much pain. Pills crushed in apple sauce or pudding - pt not cooperative this afternoon with taking meds. Pt nonverbal most of day - few words. Still awaiting placement. Will CTM.

## 2021-10-14 NOTE — PROGRESS NOTES
"DAILY PROGRESS NOTE  [facility]    Patient Identification:  Name: Ned Basilio  Age: 65 y.o.  Sex: male  :  1955  MRN: [id number]         Primary Care Physician: Seth Villanueva MD    Subjective:  Interval History:No complaints.    Objective:    Scheduled Meds:baclofen, 10 mg, Oral, BID  bisacodyl, 10 mg, Rectal, Daily  cholecalciferol, 1,000 Units, Oral, BID  dantrolene, 25 mg, Oral, Daily  docusate sodium, 100 mg, Oral, BID  enoxaparin, 30 mg, Subcutaneous, Q24H  fluticasone, 1 spray, Each Nare, Daily  ipratropium-albuterol, 3 mL, Nebulization, TID  isosorbide dinitrate, 20 mg, Oral, Q12H  lamoTRIgine, 100 mg, Oral, BID  levETIRAcetam, 1,000 mg, Oral, BID  levothyroxine, 125 mcg, Oral, QAM  metoprolol tartrate, 25 mg, Oral, Q12H  miconazole, , Topical, BID  montelukast, 10 mg, Oral, Nightly  pantoprazole, 40 mg, Oral, QAM  PARoxetine, 30 mg, Oral, Daily  polyethylene glycol, 17 g, Oral, Daily  senna, 3 tablet, Oral, BID  sodium chloride, 3 mL, Intravenous, Q12H  traZODone, 50 mg, Oral, Nightly  vitamin D, 50,000 Units, Oral, Q7 Days      Continuous Infusions:lactated ringers, 125 mL/hr, Last Rate: 125 mL/hr (10/13/21 2357)        Vital signs in last 24 hours:  Temp:  [97.8 °F (36.6 °C)-100 °F (37.8 °C)] 99.3 °F (37.4 °C)  Heart Rate:  [] 87  Resp:  [18-20] 20  BP: ()/(56-82) 123/70    Intake/Output:    Intake/Output Summary (Last 24 hours) at 10/14/2021 1147  Last data filed at 10/14/2021 0938  Gross per 24 hour   Intake 1056 ml   Output --   Net 1056 ml       Exam:  /70 (BP Location: Right arm, Patient Position: Lying)   Pulse 87   Temp 99.3 °F (37.4 °C) (Oral)   Resp 20   Ht 152.4 cm (60\")   Wt 90.5 kg (199 lb 8 oz)   SpO2 98%   BMI 38.96 kg/m²     General Appearance:    Alert, cooperative, no distress   Head:    Normocephalic, without obvious abnormality, atraumatic   Eyes:       Throat:   Lips, tongue, gums normal   Neck:   Supple, symmetrical, trachea " midline, no JVD   Lungs:     Clear to auscultation bilaterally, respirations unlabored   Chest Wall:    No tenderness or deformity    Heart:    Regular rate and rhythm, S1 and S2 normal, no murmur,no  Rub or gallop   Abdomen:     Soft, nontender, bowel sounds active, no masses, no organomegaly    Extremities:   Extremities normal, right thigh surgical changes, no cyanosis or edema   Pulses:      Skin:   Skin is warm and dry,  no rashes or palpable lesions   Neurologic:   Has CP      Lab Results (last 72 hours)     Procedure Component Value Units Date/Time    Basic Metabolic Panel [[id number]]  (Abnormal) Collected: 10/13/21 0710    Specimen: Blood Updated: 10/13/21 0832     Glucose 83 mg/dL      BUN 7 mg/dL      Creatinine 0.53 mg/dL      Sodium 140 mmol/L      Potassium 3.7 mmol/L      Chloride 104 mmol/L      CO2 28.2 mmol/L      Calcium 7.9 mg/dL      eGFR Non African Amer >150 mL/min/1.73      BUN/Creatinine Ratio 13.2     Anion Gap 7.8 mmol/L     Narrative:      GFR Normal >60  Chronic Kidney Disease <60  Kidney Failure <15      CBC & Differential [[id number]]  (Abnormal) Collected: 10/13/21 0710    Specimen: Blood Updated: 10/13/21 0806    Narrative:      The following orders were created for panel order CBC & Differential.  Procedure                               Abnormality         Status                     ---------                               -----------         ------                     CBC Auto Differential[[id number]]        Abnormal            Final result                 Please view results for these tests on the individual orders.    CBC Auto Differential [[id number]]  (Abnormal) Collected: 10/13/21 0710    Specimen: Blood Updated: 10/13/21 0806     WBC 7.73 10*3/mm3      RBC 2.62 10*6/mm3      Hemoglobin 8.3 g/dL      Hematocrit 24.7 %      MCV 94.3 fL      MCH 31.7 pg      MCHC 33.6 g/dL      RDW 13.0 %      RDW-SD 44.9 fl      MPV 10.1 fL      Platelets 155 10*3/mm3      Neutrophil % 51.4 %       Lymphocyte % 28.5 %      Monocyte % 14.9 %      Eosinophil % 4.1 %      Basophil % 0.6 %      Immature Grans % 0.5 %      Neutrophils, Absolute 3.97 10*3/mm3      Lymphocytes, Absolute 2.20 10*3/mm3      Monocytes, Absolute 1.15 10*3/mm3      Eosinophils, Absolute 0.32 10*3/mm3      Basophils, Absolute 0.05 10*3/mm3      Immature Grans, Absolute 0.04 10*3/mm3      nRBC 0.0 /100 WBC     Vitamin D 25 Hydroxy [[id number]]  (Normal) Collected: 10/12/21 1139    Specimen: Blood Updated: 10/12/21 1250     25 Hydroxy, Vitamin D 44.3 ng/ml     Narrative:      Reference Range for Total Vitamin D 25(OH)     Deficiency <20.0 ng/mL   Insufficiency 21-29 ng/mL   Sufficiency  ng/mL  Toxicity >100 ng/ml    Results may be falsely increased if patient taking Biotin.      Hemoglobin & Hematocrit, Blood [[id number]]  (Abnormal) Collected: 10/12/21 1139    Specimen: Blood Updated: 10/12/21 1203     Hemoglobin 7.9 g/dL      Hematocrit 24.9 %     Basic Metabolic Panel [[id number]]  (Abnormal) Collected: 10/12/21 0521    Specimen: Blood Updated: 10/12/21 0643     Glucose 134 mg/dL      BUN 8 mg/dL      Creatinine 0.61 mg/dL      Sodium 137 mmol/L      Potassium 4.3 mmol/L      Chloride 103 mmol/L      CO2 26.6 mmol/L      Calcium 7.7 mg/dL      eGFR Non African Amer 133 mL/min/1.73      BUN/Creatinine Ratio 13.1     Anion Gap 7.4 mmol/L     Narrative:      GFR Normal >60  Chronic Kidney Disease <60  Kidney Failure <15      CBC & Differential [[id number]]  (Abnormal) Collected: 10/12/21 0521    Specimen: Blood Updated: 10/12/21 0638    Narrative:      The following orders were created for panel order CBC & Differential.  Procedure                               Abnormality         Status                     ---------                               -----------         ------                     CBC Auto Differential[[id number]]        Abnormal            Final result                 Please view results for these tests on the individual  orders.    CBC Auto Differential [[id number]]  (Abnormal) Collected: 10/12/21 0521    Specimen: Blood Updated: 10/12/21 0638     WBC 10.49 10*3/mm3      RBC 2.57 10*6/mm3      Hemoglobin 8.3 g/dL      Hematocrit 24.8 %      MCV 96.5 fL      MCH 32.3 pg      MCHC 33.5 g/dL      RDW 12.1 %      RDW-SD 42.3 fl      MPV 10.2 fL      Platelets 201 10*3/mm3      Neutrophil % 76.6 %      Lymphocyte % 12.5 %      Monocyte % 10.3 %      Eosinophil % 0.0 %      Basophil % 0.1 %      Immature Grans % 0.5 %      Neutrophils, Absolute 8.04 10*3/mm3      Lymphocytes, Absolute 1.31 10*3/mm3      Monocytes, Absolute 1.08 10*3/mm3      Eosinophils, Absolute 0.00 10*3/mm3      Basophils, Absolute 0.01 10*3/mm3      Immature Grans, Absolute 0.05 10*3/mm3      nRBC 0.0 /100 WBC     COVID PRE-OP / PRE-PROCEDURE SCREENING ORDER (NO ISOLATION) - Swab, Nasopharynx [[id number]]  (Normal) Collected: 10/10/21 2326    Specimen: Swab from Nasopharynx Updated: 10/11/21 0222    Narrative:      The following orders were created for panel order COVID PRE-OP / PRE-PROCEDURE SCREENING ORDER (NO ISOLATION) - Swab, Nasopharynx.  Procedure                               Abnormality         Status                     ---------                               -----------         ------                     COVID-19, [facility] IN-HOUSE...[[id number]]  Normal              Final result                 Please view results for these tests on the individual orders.    COVID-19,[facility] IN-HOUSE CEPHEID/KATHRYN NP SWAB IN TRANSPORT MEDIA 8-12 HR TAT - Swab, Nasopharynx [[id number]]  (Normal) Collected: 10/10/21 2326    Specimen: Swab from Nasopharynx Updated: 10/11/21 0222     COVID19 Not Detected    Narrative:      Fact sheet for providers: https://www.fda.gov/media/704229/download     Fact sheet for patients: https://www.fda.gov/media/444547/download    Procalcitonin [[id number]]  (Normal) Collected: 10/10/21 4162    Specimen: Blood Updated: 10/11/21 0756     Procalcitonin 0.03  "ng/mL     Narrative:      As a Marker for Sepsis (Non-Neonates):     1. <0.5 ng/mL represents a low risk of severe sepsis and/or septic shock.  2. >2 ng/mL represents a high risk of severe sepsis and/or septic shock.    As a Marker for Lower Respiratory Tract Infections that require antibiotic therapy:  PCT on Admission     Antibiotic Therapy             6-12 Hrs later  >0.5                          Strongly Recommended            >0.25 - <0.5             Recommended  0.1 - 0.25                  Discouraged                       Remeasure/reassess PCT  <0.1                         Strongly Discouraged         Remeasure/reassess PCT      As 28 day mortality risk marker: \"Change in Procalcitonin Result\" (>80% or <=80%) if Day 0 (or Day 1) and Day 4 values are available. Refer to http://www.ProgrammerMeetDesigner.comJim [facility]iafer[facility] [place]37mhealthpct-calculator.com/    Change in PCT <=80 %   A decrease of PCT levels below or equal to 80% defines a positive change in PCT test result representing a higher risk for 28-day all-cause mortality of patients diagnosed with severe sepsis or septic shock.    Change in PCT >80 %   A decrease of PCT levels of more than 80% defines a negative change in PCT result representing a lower risk for 28-day all-cause mortality of patients diagnosed with severe sepsis or septic shock.              Results may be falsely decreased if patient taking Biotin.     Protime-INR [[id number]]  (Normal) Collected: 10/10/21 2218    Specimen: Blood Updated: 10/10/21 2257     Protime 13.1 Seconds      INR 1.01    Comprehensive Metabolic Panel [[id number]]  (Abnormal) Collected: 10/10/21 2218    Specimen: Blood Updated: 10/10/21 2253     Glucose 106 mg/dL      BUN 5 mg/dL      Creatinine 0.78 mg/dL      Sodium 140 mmol/L      Potassium 4.1 mmol/L      Chloride 103 mmol/L      CO2 29.2 mmol/L      Calcium 8.8 mg/dL      Total Protein 6.9 g/dL      Albumin 4.10 g/dL      ALT (SGPT) 17 U/L      AST (SGOT) 18 U/L      Alkaline Phosphatase 89 U/L      Total " Bilirubin 0.2 mg/dL      eGFR Non African Amer 100 mL/min/1.73      Globulin 2.8 gm/dL      A/G Ratio 1.5 g/dL      BUN/Creatinine Ratio 6.4     Anion Gap 7.8 mmol/L     Narrative:      GFR Normal >60  Chronic Kidney Disease <60  Kidney Failure <15      CBC & Differential [[id number]]  (Abnormal) Collected: 10/10/21 2218    Specimen: Blood Updated: 10/10/21 2242    Narrative:      The following orders were created for panel order CBC & Differential.  Procedure                               Abnormality         Status                     ---------                               -----------         ------                     CBC Auto Differential[[id number]]        Abnormal            Final result                 Please view results for these tests on the individual orders.    CBC Auto Differential [[id number]]  (Abnormal) Collected: 10/10/21 2218    Specimen: Blood Updated: 10/10/21 2242     WBC 11.23 10*3/mm3      RBC 4.24 10*6/mm3      Hemoglobin 13.6 g/dL      Hematocrit 42.2 %      MCV 99.5 fL      MCH 32.1 pg      MCHC 32.2 g/dL      RDW 12.6 %      RDW-SD 46.9 fl      MPV 10.2 fL      Platelets 245 10*3/mm3      Neutrophil % 74.3 %      Lymphocyte % 15.2 %      Monocyte % 7.8 %      Eosinophil % 2.0 %      Basophil % 0.4 %      Immature Grans % 0.3 %      Neutrophils, Absolute 8.33 10*3/mm3      Lymphocytes, Absolute 1.71 10*3/mm3      Monocytes, Absolute 0.88 10*3/mm3      Eosinophils, Absolute 0.23 10*3/mm3      Basophils, Absolute 0.05 10*3/mm3      Immature Grans, Absolute 0.03 10*3/mm3      nRBC 0.0 /100 WBC         Data Review:  Results from last 7 days   Lab Units 10/14/21  0504 10/13/21  0710 10/13/21  0710 10/12/21  0521 10/12/21  0521   SODIUM mmol/L 143  --  140  --  137   POTASSIUM mmol/L 3.8  --  3.7  --  4.3   CHLORIDE mmol/L 108*  --  104  --  103   CO2 mmol/L 28.8  --  28.2  --  26.6   BUN mg/dL 6*  --  7*  --  8   CREATININE mg/dL 0.47*  --  0.53*  --  0.61*   GLUCOSE mg/dL 92   < > 83   < > 134*    CALCIUM mg/dL 8.0*  --  7.9*  --  7.7*    < > = values in this interval not displayed.     Results from last 7 days   Lab Units 10/14/21  0504 10/13/21  1819 10/13/21  0710 10/12/21  1139 10/12/21  0521 10/10/21  2218 10/10/21  2218   WBC 10*3/mm3  --   --  7.73  --  10.49  --  11.23*   HEMOGLOBIN g/dL 7.1* 7.5* 8.3*   < > 8.3*   < > 13.6   HEMATOCRIT % 21.4* 22.2* 24.7*   < > 24.8*   < > 42.2   PLATELETS 10*3/mm3  --   --  155  --  201  --  245    < > = values in this interval not displayed.             No results found for: TROPONINT      Results from last 7 days   Lab Units 10/10/21  2218   ALK PHOS U/L 89   BILIRUBIN mg/dL 0.2   ALT (SGPT) U/L 17   AST (SGOT) U/L 18             No results found for: POCGLU  Results from last 7 days   Lab Units 10/10/21  2218   INR  1.01       Past Medical History:   Diagnosis Date   • Anxiety    • Arthritis    • Sommers's esophagus    • Cardiomegaly    • Cerebral palsy (HCC)    • Constipation    • Disease of thyroid gland    • GERD (gastroesophageal reflux disease)    • Hyperlipidemia    • Osteoporosis    • Parkinson disease (HCC)    • Pre-operative cardiovascular examination    • Raynaud's disease    • Right hemiplegia (HCC)    • Seizures (HCC)        Assessment:  Active Hospital Problems    Diagnosis  POA   • **Closed displaced oblique fracture of shaft of right femur (HCC) [S72.331A]  Yes   • ABLA (acute blood loss anemia) [D62]  Unknown   • Sommers's esophagus [K22.70]  Unknown   • Chronic constipation [K59.09]  Yes   • Seizure disorder (HCC) [G40.909]  Yes   • Cerebral palsy (HCC) [G80.9]  Yes   • Parkinson disease (HCC) [G20]  Yes   • Osteoporosis [M81.0]  Yes   • Chronic respiratory failure with hypoxia (HCC) [J96.11]  Yes   • Hypothyroidism [E03.9]  Yes   • Coronary artery disease [I25.10]  Yes      Resolved Hospital Problems    Diagnosis Date Resolved POA   • Leukocytosis [D72.829] 10/12/2021 Yes       Plan:  Post op care.  Pain control. Will need SNU for rehab.  Follow lab and transfuse 1 unit packed red blood cells today.    Sukh Martinez MD  10/14/2021  11:47 EDT

## 2021-10-14 NOTE — PLAN OF CARE
Goal Outcome Evaluation:  Plan of Care Reviewed With: patient        Progress: improving  Outcome Summary: POD#3 OF RIGHT HIP NAILING. VSS. BP LOW SOMETIMES. CONTRACTED ON ALL FOUR EXTREMITIES. PILLS CRUSHED IN APPLE SAUCE OR PUDDING. PO PAIN MEDICATION HELPING WITH PAIN. PATIENT IS NONVERBAL MOST OF THE TIME. EDUCATION PROVIDED ON BP MONITORING. NO EVIDENCE OF LEARNING.

## 2021-10-14 NOTE — CASE MANAGEMENT/SOCIAL WORK
Continued Stay Note  [facility]     Patient Name: Ned Basilio  MRN: [id number]  Today's Date: 10/14/2021    Admit Date: 10/10/2021     Discharge Plan     Row Name 10/14/21 1530       Plan    Plan SNF -- Referrals Pending.    Patient/Family in Agreement with Plan yes    Plan Comments CCP spoke with Christophe/Jerome who is unable to accept the patient at this time. Still waiting on determinations from [facility] and [email or web address].    Row Name 10/14/21 1237       Plan    Plan SNF -- Referrals Pending.    Patient/Family in Agreement with Plan yes    Plan Comments Spoke with Orly/Mario who is unable to accept at this time. Updated the patient's guardian/Chanell who's agreeable. CMS Compare SNF List was reviewed in detail again, and she requests [facility], [facility], and [facility]. Referrals sent in [facility]. Spoke with Christophe/Jer[facility], Oksana/[facility] and Di/Wanda all of whom will look into the patient's case and f/u with CCP.               Discharge Codes    No documentation.                     Sruthi Lopez RN

## 2021-10-15 LAB
ANION GAP SERPL CALCULATED.3IONS-SCNC: 9.6 MMOL/L (ref 5–15)
BASOPHILS # BLD AUTO: 0.04 10*3/MM3 (ref 0–0.2)
BASOPHILS NFR BLD AUTO: 0.6 % (ref 0–1.5)
BH BB BLOOD EXPIRATION DATE: NORMAL
BH BB BLOOD TYPE BARCODE: 6200
BH BB DISPENSE STATUS: NORMAL
BH BB PRODUCT CODE: NORMAL
BH BB UNIT NUMBER: NORMAL
BUN SERPL-MCNC: 6 MG/DL (ref 8–23)
BUN/CREAT SERPL: 13 (ref 7–25)
CALCIUM SPEC-SCNC: 8.2 MG/DL (ref 8.6–10.5)
CHLORIDE SERPL-SCNC: 105 MMOL/L (ref 98–107)
CO2 SERPL-SCNC: 26.4 MMOL/L (ref 22–29)
CREAT SERPL-MCNC: 0.46 MG/DL (ref 0.76–1.27)
CROSSMATCH INTERPRETATION: NORMAL
DEPRECATED RDW RBC AUTO: 45.9 FL (ref 37–54)
EOSINOPHIL # BLD AUTO: 0.31 10*3/MM3 (ref 0–0.4)
EOSINOPHIL NFR BLD AUTO: 4.7 % (ref 0.3–6.2)
ERYTHROCYTE [DISTWIDTH] IN BLOOD BY AUTOMATED COUNT: 13.2 % (ref 12.3–15.4)
GFR SERPL CREATININE-BSD FRML MDRD: >150 ML/MIN/1.73
GLUCOSE SERPL-MCNC: 75 MG/DL (ref 65–99)
HCT VFR BLD AUTO: 26.1 % (ref 37.5–51)
HGB BLD-MCNC: 8.7 G/DL (ref 13–17.7)
IMM GRANULOCYTES # BLD AUTO: 0.04 10*3/MM3 (ref 0–0.05)
IMM GRANULOCYTES NFR BLD AUTO: 0.6 % (ref 0–0.5)
LYMPHOCYTES # BLD AUTO: 1.74 10*3/MM3 (ref 0.7–3.1)
LYMPHOCYTES NFR BLD AUTO: 26.2 % (ref 19.6–45.3)
MCH RBC QN AUTO: 31.8 PG (ref 26.6–33)
MCHC RBC AUTO-ENTMCNC: 33.3 G/DL (ref 31.5–35.7)
MCV RBC AUTO: 95.3 FL (ref 79–97)
MONOCYTES # BLD AUTO: 0.78 10*3/MM3 (ref 0.1–0.9)
MONOCYTES NFR BLD AUTO: 11.7 % (ref 5–12)
NEUTROPHILS NFR BLD AUTO: 3.74 10*3/MM3 (ref 1.7–7)
NEUTROPHILS NFR BLD AUTO: 56.2 % (ref 42.7–76)
NRBC BLD AUTO-RTO: 0.5 /100 WBC (ref 0–0.2)
PLATELET # BLD AUTO: 166 10*3/MM3 (ref 140–450)
PMV BLD AUTO: 10.3 FL (ref 6–12)
POTASSIUM SERPL-SCNC: 3.7 MMOL/L (ref 3.5–5.2)
RBC # BLD AUTO: 2.74 10*6/MM3 (ref 4.14–5.8)
SODIUM SERPL-SCNC: 141 MMOL/L (ref 136–145)
UNIT  ABO: NORMAL
UNIT  RH: NORMAL
WBC # BLD AUTO: 6.65 10*3/MM3 (ref 3.4–10.8)

## 2021-10-15 PROCEDURE — 94799 UNLISTED PULMONARY SVC/PX: CPT

## 2021-10-15 PROCEDURE — 25010000002 ENOXAPARIN PER 10 MG: Performed by: ORTHOPAEDIC SURGERY

## 2021-10-15 PROCEDURE — 36415 COLL VENOUS BLD VENIPUNCTURE: CPT | Performed by: ORTHOPAEDIC SURGERY

## 2021-10-15 PROCEDURE — 85025 COMPLETE CBC W/AUTO DIFF WBC: CPT | Performed by: HOSPITALIST

## 2021-10-15 PROCEDURE — 80048 BASIC METABOLIC PNL TOTAL CA: CPT | Performed by: ORTHOPAEDIC SURGERY

## 2021-10-15 RX ORDER — ASPIRIN 81 MG/1
81 TABLET ORAL DAILY
Status: DISCONTINUED | OUTPATIENT
Start: 2021-10-16 | End: 2021-10-20 | Stop reason: HOSPADM

## 2021-10-15 RX ADMIN — POLYETHYLENE GLYCOL 3350 17 G: 17 POWDER, FOR SOLUTION ORAL at 09:11

## 2021-10-15 RX ADMIN — DOCUSATE SODIUM 100 MG: 100 CAPSULE, LIQUID FILLED ORAL at 09:07

## 2021-10-15 RX ADMIN — BISACODYL 10 MG: 10 SUPPOSITORY RECTAL at 09:29

## 2021-10-15 RX ADMIN — MONTELUKAST SODIUM 10 MG: 10 TABLET, FILM COATED ORAL at 22:32

## 2021-10-15 RX ADMIN — IPRATROPIUM BROMIDE AND ALBUTEROL SULFATE 3 ML: 2.5; .5 SOLUTION RESPIRATORY (INHALATION) at 09:56

## 2021-10-15 RX ADMIN — LEVETIRACETAM 1000 MG: 500 TABLET, FILM COATED ORAL at 22:30

## 2021-10-15 RX ADMIN — SODIUM CHLORIDE, POTASSIUM CHLORIDE, SODIUM LACTATE AND CALCIUM CHLORIDE 125 ML/HR: 600; 310; 30; 20 INJECTION, SOLUTION INTRAVENOUS at 02:05

## 2021-10-15 RX ADMIN — LEVOTHYROXINE SODIUM 125 MCG: 0.12 TABLET ORAL at 06:09

## 2021-10-15 RX ADMIN — Medication 1000 UNITS: at 09:12

## 2021-10-15 RX ADMIN — LAMOTRIGINE 100 MG: 100 TABLET ORAL at 09:18

## 2021-10-15 RX ADMIN — PANTOPRAZOLE SODIUM 40 MG: 40 TABLET, DELAYED RELEASE ORAL at 06:09

## 2021-10-15 RX ADMIN — METOPROLOL TARTRATE 25 MG: 25 TABLET, FILM COATED ORAL at 09:14

## 2021-10-15 RX ADMIN — METOPROLOL TARTRATE 25 MG: 25 TABLET, FILM COATED ORAL at 22:37

## 2021-10-15 RX ADMIN — DANTROLENE SODIUM 25 MG: 25 CAPSULE ORAL at 09:15

## 2021-10-15 RX ADMIN — Medication 1000 UNITS: at 22:31

## 2021-10-15 RX ADMIN — TRAZODONE HYDROCHLORIDE 50 MG: 50 TABLET ORAL at 22:31

## 2021-10-15 RX ADMIN — IPRATROPIUM BROMIDE AND ALBUTEROL SULFATE 3 ML: 2.5; .5 SOLUTION RESPIRATORY (INHALATION) at 20:29

## 2021-10-15 RX ADMIN — LAMOTRIGINE 100 MG: 100 TABLET ORAL at 22:34

## 2021-10-15 RX ADMIN — SENNOSIDES 3 TABLET: 8.6 TABLET, FILM COATED ORAL at 09:10

## 2021-10-15 RX ADMIN — BACLOFEN 10 MG: 10 TABLET ORAL at 09:11

## 2021-10-15 RX ADMIN — SENNOSIDES 3 TABLET: 8.6 TABLET, FILM COATED ORAL at 22:33

## 2021-10-15 RX ADMIN — PAROXETINE HYDROCHLORIDE 30 MG: 30 TABLET, FILM COATED ORAL at 09:17

## 2021-10-15 RX ADMIN — HYDROCODONE BITARTRATE AND ACETAMINOPHEN 1 TABLET: 7.5; 325 TABLET ORAL at 10:48

## 2021-10-15 RX ADMIN — ENOXAPARIN SODIUM 30 MG: 30 INJECTION SUBCUTANEOUS at 08:44

## 2021-10-15 RX ADMIN — MICONAZOLE NITRATE 1 APPLICATION: 2 POWDER TOPICAL at 22:32

## 2021-10-15 RX ADMIN — FLUTICASONE PROPIONATE 1 SPRAY: 50 SPRAY, METERED NASAL at 09:20

## 2021-10-15 RX ADMIN — BACLOFEN 10 MG: 10 TABLET ORAL at 22:32

## 2021-10-15 RX ADMIN — MICONAZOLE NITRATE 1 APPLICATION: 2 POWDER TOPICAL at 09:09

## 2021-10-15 RX ADMIN — HYDROCODONE BITARTRATE AND ACETAMINOPHEN 1 TABLET: 7.5; 325 TABLET ORAL at 17:06

## 2021-10-15 RX ADMIN — LEVETIRACETAM 1000 MG: 500 TABLET, FILM COATED ORAL at 09:09

## 2021-10-15 RX ADMIN — SODIUM CHLORIDE, POTASSIUM CHLORIDE, SODIUM LACTATE AND CALCIUM CHLORIDE 125 ML/HR: 600; 310; 30; 20 INJECTION, SOLUTION INTRAVENOUS at 10:12

## 2021-10-15 RX ADMIN — SODIUM CHLORIDE, PRESERVATIVE FREE 3 ML: 5 INJECTION INTRAVENOUS at 08:41

## 2021-10-15 NOTE — CASE MANAGEMENT/SOCIAL WORK
Continued Stay Note  [facility]     Patient Name: Ned Basilio  MRN: [id number]  Today's Date: 10/15/2021    Admit Date: 10/10/2021     Discharge Plan     Row Name 10/15/21 1326       Plan    Plan SNF -- Referral Pending.    Patient/Family in Agreement with Plan yes    Plan Comments [facility] is unable to accept. Called and left a message for [facility] to f/u on referral. Await her call back.               Discharge Codes    No documentation.                     Sruthi Lopez RN

## 2021-10-15 NOTE — PLAN OF CARE
Goal Outcome Evaluation:  Plan of Care Reviewed With: patient        Progress: no change  Outcome Summary: vss, verbalizes non-intelligible utterances, extremities are contracted, rt femur dsg intact, refuses scd, on o2 at 2 liters, voids incontinently, takes pills crushed in applesauce or pudding, no seizure activity this shift, discharge plans are for snf

## 2021-10-15 NOTE — CASE MANAGEMENT/SOCIAL WORK
Continued Stay Note  [facility]     Patient Name: Ned Basilio  MRN: [id number]  Today's Date: 10/15/2021    Admit Date: 10/10/2021     Discharge Plan     Row Name 10/15/21 9119       Plan    Plan [facility].    Patient/Family in Agreement with Plan yes    Plan Comments Spoke with Major/Brit who said he spoke with his nurse and the patient will need to be completely off Oxygen, off Lovenox and be able to Sit-to-Stand transfer via their [facility]. CCP updated Major that the patient is nonweightbearing at this time per Ortho. Major said nonetheless, the patient needs to be able to sit-to-stand transfer due to their limited staffing. Discussed case with Maryjane Craig RN. Spoke with Christophe/Jer[facility] who does not have any Medicaid SNF bed available at any of her facilities. Updated doreen/Chanell who requests [facility]. Referrals sent in [facility]. Spoke with Oksana/Moe who believes she has Medicaid SNF beds available at both facilities - she will look into the pt's case. Await their determination.    Row Name 10/15/21 2648       Plan    Plan Return to [facility] with [facility] .    Patient/Family in Agreement with Plan yes    Plan Comments Spoke with Di[facility]An[facility] who is unable to accept and [facility] who is also unable to accept. Per Physical Therapy, the patient has reached his baseline PTA. Discussed with doreen/Chanell who plans for the patient to return to the [facility] with HH. She requests [facility] . Referral sent in [facility]. Updated the patient's /Judith Garrido ([phone]) who's agreeable with the d/c plan. Updated Major Soni/Brit who said the patient needs to return to his complete baseline prior to returning home. CCP inquired whether the patient is allowed to have home health services visit with him and Major is unsure - he is reaching out to his nurse to see if HH would be allowed and will f/u with CCP  shortly. Major did say that the patient cannot return on Oxygen - Oxygen is being weaned per nursing. Updated Rosa, APRN/LHA.               Discharge Codes    No documentation.                     Sruthi Lopez RN

## 2021-10-15 NOTE — DISCHARGE PLACEMENT REQUEST
"Dillon Basilio (65 y.o. Male)             Date of Birth Social Security Number Address Home Phone MRN    1955  [address]   [place] [id number] [phone] [id number]    Orthodoxy Marital Status             None Single       Admission Date Admission Type Admitting Provider Attending Provider Department, Room/Bed    10/10/21 Emergency Ray, MD Dylan Lance Samer H, MD [facility] [address], P877/1    Discharge Date Discharge Disposition Discharge Destination                         Attending Provider: Desean Richardson MD    Allergies: Lovastatin    Isolation: None   Infection: None   Code Status: No CPR   Advance Care Planning Activity    Ht: 152.4 cm (60\")   Wt: 90.5 kg (199 lb 8 oz)    Admission Cmt: None   Principal Problem: Closed displaced oblique fracture of shaft of right femur (HCC) [S72.331A]                 Active Insurance as of 10/10/2021     Primary Coverage     Payor Plan Insurance Group Employer/Plan Group    MEDICARE MEDICARE A & B      Payor Plan Address Payor Plan Phone Number Payor Plan Fax Number Effective Dates    [address] [phone]  11/1/1976 - None Entered    [facility] [id number]       Subscriber Name Subscriber Birth Date Member ID       DILLON BASILIO 1955 [id number]           Secondary Coverage     Payor Plan Insurance Group Employer/Plan Group    [place]      Payor Plan Address Payor Plan Phone Number Payor Plan Fax Number Effective Dates    [address] [phone]  3/4/2019 - None Entered    [place] [address]       Subscriber Name Subscriber Birth Date Member ID       DILLON BASILIO 1955 [id number]                 Emergency Contacts      (Rel.) Home Phone Work Phone Mobile Phone    Chanell Valencia (Guardian) [phone] -- [phone]    REJI GREGORIO () () [phone] -- --    Sweta Rdz (Guardian) [phone] -- --    EMANUAL,UNKNOWN (Care Giver) -- -- " [phone]    Jemal Castano (Other) -- [phone] --

## 2021-10-15 NOTE — PLAN OF CARE
Goal Outcome Evaluation:  Plan of Care Reviewed With: patient        Progress: no change  Outcome Summary: Patient stable thoughout shift. Voiding incontinently and x2 BMs. Skin intact, turned Q2 to prevent skin breakdown. Medicated x2 for significant groaning/grimaces. Awaiting d/c plan, snf vs return to group home.

## 2021-10-15 NOTE — PROGRESS NOTES
Name: Ned Basilio ADMIT: 10/10/2021   : 1955  PCP: Seth Villanueva MD    MRN: [id number] LOS: 4 days   AGE/SEX: 65 y.o. male  ROOM: [facility]     Subjective   Subjective   Nonverbal. Eating breakfast w/assistance. D/W RN. No nursing concerns. +BM yesterday    Review of Systems   Unable to perform ROS: Patient nonverbal        Objective   Objective   Vital Signs  Temp:  [97.5 °F (36.4 °C)-99.1 °F (37.3 °C)] 97.5 °F (36.4 °C)  Heart Rate:  [59-84] 59  Resp:  [18-20] 18  BP: (105-126)/(62-76) 108/63  SpO2:  [95 %-100 %] 96 %  on  Flow (L/min):  [2] 2;   Device (Oxygen Therapy): nasal cannula  Body mass index is 38.96 kg/m².  Physical Exam  Vitals and nursing note reviewed.   Constitutional:       General: He is not in acute distress.     Appearance: He is obese. He is ill-appearing. He is not toxic-appearing.   HENT:      Head: Normocephalic.      Mouth/Throat:      Mouth: Mucous membranes are moist.   Eyes:      Conjunctiva/sclera: Conjunctivae normal.   Cardiovascular:      Rate and Rhythm: Normal rate and regular rhythm.   Pulmonary:      Effort: Pulmonary effort is normal. No respiratory distress.      Breath sounds: No wheezing or rales.   Abdominal:      General: Bowel sounds are normal. There is distension.      Tenderness: There is no abdominal tenderness.   Musculoskeletal:      Cervical back: Neck supple.      Right lower leg: Edema present.      Left lower leg: Edema present.      Comments: Trace pedal edema   Neurological:      Mental Status: He is alert. Mental status is at baseline.   Psychiatric:         Speech: He is noncommunicative.         Results Review     I reviewed the patient's new clinical results.  Results from last 7 days   Lab Units 10/15/21  0522 10/14/21  1941 10/14/21  0504 10/13/21  1819 10/13/21  0710 10/13/21  0710 10/12/21  1139 10/12/21  0521 10/10/21  2218 10/10/21  2218   WBC 10*3/mm3 6.65  --   --   --   --  7.73  --  10.49  --  11.23*   HEMOGLOBIN g/dL 8.7*  9.7* 7.1* 7.5*   < > 8.3*   < > 8.3*   < > 13.6   PLATELETS 10*3/mm3 166  --   --   --   --  155  --  201  --  245    < > = values in this interval not displayed.     Results from last 7 days   Lab Units 10/15/21  0522 10/14/21  0504 10/13/21  0710 10/12/21  0521   SODIUM mmol/L 141 143 140 137   POTASSIUM mmol/L 3.7 3.8 3.7 4.3   CHLORIDE mmol/L 105 108* 104 103   CO2 mmol/L 26.4 28.8 28.2 26.6   BUN mg/dL 6* 6* 7* 8   CREATININE mg/dL 0.46* 0.47* 0.53* 0.61*   GLUCOSE mg/dL 75 92 83 134*   Estimated Creatinine Clearance: 151.9 mL/min (A) (by C-G formula based on SCr of 0.46 mg/dL (L)).  Results from last 7 days   Lab Units 10/10/21  2218   ALBUMIN g/dL 4.10   BILIRUBIN mg/dL 0.2   ALK PHOS U/L 89   AST (SGOT) U/L 18   ALT (SGPT) U/L 17     Results from last 7 days   Lab Units 10/15/21  0522 10/14/21  0504 10/13/21  0710 10/12/21  0521 10/10/21  2218 10/10/21  2218   CALCIUM mg/dL 8.2* 8.0* 7.9* 7.7*   < > 8.8   ALBUMIN g/dL  --   --   --   --   --  4.10    < > = values in this interval not displayed.     Results from last 7 days   Lab Units 10/10/21  2218   PROCALCITONIN ng/mL 0.03     COVID19   Date Value Ref Range Status   10/10/2021 Not Detected Not Detected - Ref. Range Final     SARS-CoV-2 PCR   Date Value Ref Range Status   05/14/2021 Not Detected Not Detected Final     Comment:     Nucleic acid specific to SARS-CoV-2 (COVID-19) virus was not detected in  this sample by the Aptima (R) SARS-CoV-2 Assay.                SARS-CoV-2 (COVID-19) nucleic acid testing performed using [employer] Aptima (R) SARS-CoV-2 Assay or [employer] TaqPath (TM)  COVID-19 Combo Kit as indicated above under Emergency Use Authorization (EUA) from the FDA. Aptima (R) and TaqPath (TM) test performance  characteristics verified by [employer] in accordance with the FDAs Guidance Document (Policy for Diagnostic Tests for Coronavirus Disease-2019  during the Public Health Emergency) issued on March 16, 2020. The laboratory  is regulated under CLIA as qualified to perform high-complexity testing  Unless otherwise noted, all testing was performed at [facility], CLIA No. [id number], [place] State Licensee No. [id number]     No results found for: HGBA1C, POCGLU    XR Hip With or Without Pelvis 2 - 3 View Right  TWO-VIEW PORTABLE RIGHT HIP IN OR     HISTORY: Internal fixation of fracture     Imaging in the operating room was performed at the time of internal  fixation of a fracture of the proximal femoral shaft with long  intramedullary beatrice and intersecting screws and the alignment appears  satisfactory. 6 images were obtained and the fluoroscopy time measures 6  minutes and 30 seconds.     This report was finalized on 10/11/2021 8:48 PM by Dr. Bryce Mitchell M.D.     FL C Arm During Surgery  This procedure was auto-finalized with no dictation required.    Scheduled Medications  baclofen, 10 mg, Oral, BID  bisacodyl, 10 mg, Rectal, Daily  cholecalciferol, 1,000 Units, Oral, BID  dantrolene, 25 mg, Oral, Daily  docusate sodium, 100 mg, Oral, BID  enoxaparin, 30 mg, Subcutaneous, Q24H  fluticasone, 1 spray, Each Nare, Daily  ipratropium-albuterol, 3 mL, Nebulization, TID  isosorbide dinitrate, 20 mg, Oral, Q12H  lamoTRIgine, 100 mg, Oral, BID  levETIRAcetam, 1,000 mg, Oral, BID  levothyroxine, 125 mcg, Oral, QAM  metoprolol tartrate, 25 mg, Oral, Q12H  miconazole, , Topical, BID  montelukast, 10 mg, Oral, Nightly  pantoprazole, 40 mg, Oral, QAM  PARoxetine, 30 mg, Oral, Daily  polyethylene glycol, 17 g, Oral, Daily  senna, 3 tablet, Oral, BID  sodium chloride, 3 mL, Intravenous, Q12H  traZODone, 50 mg, Oral, Nightly  vitamin D, 50,000 Units, Oral, Q7 Days    Infusions  lactated ringers, 125 mL/hr, Last Rate: 125 mL/hr (10/15/21 1012)    Diet  Diet Pureed       Assessment/Plan     Active Hospital Problems    Diagnosis  POA   • **Closed displaced oblique fracture of shaft of right femur (HCC) [S72.331A]  Yes   • ABLA (acute blood loss anemia) [D62]   Unknown   • Sommers's esophagus [K22.70]  Unknown   • Chronic constipation [K59.09]  Yes   • Seizure disorder (HCC) [G40.909]  Yes   • Cerebral palsy (HCC) [G80.9]  Yes   • Parkinson disease (HCC) [G20]  Yes   • Osteoporosis [M81.0]  Yes   • Chronic respiratory failure with hypoxia (HCC) [J96.11]  Yes   • Hypothyroidism [E03.9]  Yes   • Coronary artery disease [I25.10]  Yes      Resolved Hospital Problems    Diagnosis Date Resolved POA   • Leukocytosis [D72.829] 10/12/2021 Yes       65 y.o. male admitted with Closed displaced oblique fracture of shaft of right femur (HCC).    -S/P cephalo-medullary fixation rt prox fem shaft fx. Low dose lovenox for 21 doses for VTE ppx  -S/P 1 unit PRBC. Appropriate Hgb response. Continue to monitor  -Continue bowel regimen. +BM 10/14  -Tolerating modified diet. Requires assistance w/meals  -Contractures/spasticity: baclofen, dantrium  -Electrolytes stable  -Wean O2 as tolerated. Per CCP, pt was not on O2 prior to admission and would need to be weaned to RA to return  -Further recommendations based on hospital course    · Lovenox 30 mg SC daily for DVT prophylaxis.  · Limited code (no CPR, no intubation).  · Discussed with patient and nursing staff.  · Anticipate discharge home with home health possibly this weekend. Pt is returning to his group home w/home health if he can be weaned off O2. CCP following      BUTCH Fenton  [facility]  10/15/21  13:42 EDT

## 2021-10-15 NOTE — CASE MANAGEMENT/SOCIAL WORK
Continued Stay Note  [facility]     Patient Name: Ned Basilio  MRN: [id number]  Today's Date: 10/15/2021    Admit Date: 10/10/2021     Discharge Plan     Row Name 10/15/21 1405       Plan    Plan Return to [facility] with [facility].    Patient/Family in Agreement with Plan yes    Plan Comments Spoke with [facility] who is unable to accept and [facility] who is also unable to accept. Per Physical Therapy, the patient has reached his baseline PTA. Discussed with guardian/Chanell who plans for the patient to return to the [facility] with HH. She requests [facility]. Referral sent in [facility]. Updated the patient's /Judith Garrido ([phone]) who's agreeable with the d/c plan. [facility] said the patient needs to return to his complete baseline prior to returning home. CCP inquired whether the patient is allowed to have home health services visit with him and Major is unsure - he is reaching out to his nurse to see if HH would be allowed and will f/u with CCP shortly. Major did say that the patient cannot return on Oxygen - Oxygen is being weaned per nursing. Updated BUTCH Lee/SYDNEE.    Row Name 10/15/21 7124       Plan    Plan SNF -- Referral Pending.    Patient/Family in Agreement with Plan yes    Plan Comments [facility] is unable to accept. Called and left a message for [facility] to f/u on referral. Await her call back.               Discharge Codes    No documentation.                     Sruthi Lopez RN

## 2021-10-16 LAB
BASOPHILS # BLD AUTO: 0.04 10*3/MM3 (ref 0–0.2)
BASOPHILS # BLD AUTO: 0.05 10*3/MM3 (ref 0–0.2)
BASOPHILS NFR BLD AUTO: 0.7 % (ref 0–1.5)
BASOPHILS NFR BLD AUTO: 0.7 % (ref 0–1.5)
DEPRECATED RDW RBC AUTO: 43 FL (ref 37–54)
DEPRECATED RDW RBC AUTO: 43.4 FL (ref 37–54)
EOSINOPHIL # BLD AUTO: 0.26 10*3/MM3 (ref 0–0.4)
EOSINOPHIL # BLD AUTO: 0.35 10*3/MM3 (ref 0–0.4)
EOSINOPHIL NFR BLD AUTO: 4.2 % (ref 0.3–6.2)
EOSINOPHIL NFR BLD AUTO: 4.9 % (ref 0.3–6.2)
ERYTHROCYTE [DISTWIDTH] IN BLOOD BY AUTOMATED COUNT: 12.6 % (ref 12.3–15.4)
ERYTHROCYTE [DISTWIDTH] IN BLOOD BY AUTOMATED COUNT: 12.7 % (ref 12.3–15.4)
HCT VFR BLD AUTO: 24.9 % (ref 37.5–51)
HCT VFR BLD AUTO: 30.7 % (ref 37.5–51)
HGB BLD-MCNC: 10 G/DL (ref 13–17.7)
HGB BLD-MCNC: 8.2 G/DL (ref 13–17.7)
IMM GRANULOCYTES # BLD AUTO: 0.05 10*3/MM3 (ref 0–0.05)
IMM GRANULOCYTES # BLD AUTO: 0.06 10*3/MM3 (ref 0–0.05)
IMM GRANULOCYTES NFR BLD AUTO: 0.7 % (ref 0–0.5)
IMM GRANULOCYTES NFR BLD AUTO: 1 % (ref 0–0.5)
LYMPHOCYTES # BLD AUTO: 1.73 10*3/MM3 (ref 0.7–3.1)
LYMPHOCYTES # BLD AUTO: 1.78 10*3/MM3 (ref 0.7–3.1)
LYMPHOCYTES NFR BLD AUTO: 25 % (ref 19.6–45.3)
LYMPHOCYTES NFR BLD AUTO: 28.2 % (ref 19.6–45.3)
MCH RBC QN AUTO: 31.2 PG (ref 26.6–33)
MCH RBC QN AUTO: 31.3 PG (ref 26.6–33)
MCHC RBC AUTO-ENTMCNC: 32.6 G/DL (ref 31.5–35.7)
MCHC RBC AUTO-ENTMCNC: 32.9 G/DL (ref 31.5–35.7)
MCV RBC AUTO: 94.7 FL (ref 79–97)
MCV RBC AUTO: 96.2 FL (ref 79–97)
MONOCYTES # BLD AUTO: 0.72 10*3/MM3 (ref 0.1–0.9)
MONOCYTES # BLD AUTO: 0.94 10*3/MM3 (ref 0.1–0.9)
MONOCYTES NFR BLD AUTO: 11.7 % (ref 5–12)
MONOCYTES NFR BLD AUTO: 13.2 % (ref 5–12)
NEUTROPHILS NFR BLD AUTO: 3.33 10*3/MM3 (ref 1.7–7)
NEUTROPHILS NFR BLD AUTO: 3.96 10*3/MM3 (ref 1.7–7)
NEUTROPHILS NFR BLD AUTO: 54.2 % (ref 42.7–76)
NEUTROPHILS NFR BLD AUTO: 55.5 % (ref 42.7–76)
NRBC BLD AUTO-RTO: 0.4 /100 WBC (ref 0–0.2)
NRBC BLD AUTO-RTO: 0.5 /100 WBC (ref 0–0.2)
PLATELET # BLD AUTO: 181 10*3/MM3 (ref 140–450)
PLATELET # BLD AUTO: 195 10*3/MM3 (ref 140–450)
PMV BLD AUTO: 10.1 FL (ref 6–12)
PMV BLD AUTO: 10.4 FL (ref 6–12)
RBC # BLD AUTO: 2.63 10*6/MM3 (ref 4.14–5.8)
RBC # BLD AUTO: 3.19 10*6/MM3 (ref 4.14–5.8)
WBC # BLD AUTO: 6.14 10*3/MM3 (ref 3.4–10.8)
WBC # BLD AUTO: 7.13 10*3/MM3 (ref 3.4–10.8)

## 2021-10-16 PROCEDURE — 94799 UNLISTED PULMONARY SVC/PX: CPT

## 2021-10-16 PROCEDURE — 85025 COMPLETE CBC W/AUTO DIFF WBC: CPT | Performed by: INTERNAL MEDICINE

## 2021-10-16 PROCEDURE — 25010000002 ENOXAPARIN PER 10 MG: Performed by: NURSE PRACTITIONER

## 2021-10-16 PROCEDURE — 80053 COMPREHEN METABOLIC PANEL: CPT | Performed by: INTERNAL MEDICINE

## 2021-10-16 RX ADMIN — ISOSORBIDE DINITRATE 20 MG: 20 TABLET ORAL at 15:17

## 2021-10-16 RX ADMIN — MICONAZOLE NITRATE: 2 POWDER TOPICAL at 09:46

## 2021-10-16 RX ADMIN — LEVETIRACETAM 1000 MG: 500 TABLET, FILM COATED ORAL at 20:01

## 2021-10-16 RX ADMIN — METOPROLOL TARTRATE 25 MG: 25 TABLET, FILM COATED ORAL at 09:43

## 2021-10-16 RX ADMIN — DANTROLENE SODIUM 25 MG: 25 CAPSULE ORAL at 09:43

## 2021-10-16 RX ADMIN — ISOSORBIDE DINITRATE 20 MG: 20 TABLET ORAL at 02:41

## 2021-10-16 RX ADMIN — LEVETIRACETAM 1000 MG: 500 TABLET, FILM COATED ORAL at 09:43

## 2021-10-16 RX ADMIN — MICONAZOLE NITRATE 1 APPLICATION: 2 POWDER TOPICAL at 20:22

## 2021-10-16 RX ADMIN — Medication 1000 UNITS: at 09:44

## 2021-10-16 RX ADMIN — BACLOFEN 10 MG: 10 TABLET ORAL at 20:01

## 2021-10-16 RX ADMIN — PANTOPRAZOLE SODIUM 40 MG: 40 TABLET, DELAYED RELEASE ORAL at 06:37

## 2021-10-16 RX ADMIN — IPRATROPIUM BROMIDE AND ALBUTEROL SULFATE 3 ML: 2.5; .5 SOLUTION RESPIRATORY (INHALATION) at 19:36

## 2021-10-16 RX ADMIN — IPRATROPIUM BROMIDE AND ALBUTEROL SULFATE 3 ML: 2.5; .5 SOLUTION RESPIRATORY (INHALATION) at 16:05

## 2021-10-16 RX ADMIN — LEVOTHYROXINE SODIUM 125 MCG: 0.12 TABLET ORAL at 06:37

## 2021-10-16 RX ADMIN — ASPIRIN 81 MG: 81 TABLET, COATED ORAL at 09:44

## 2021-10-16 RX ADMIN — MONTELUKAST SODIUM 10 MG: 10 TABLET, FILM COATED ORAL at 20:00

## 2021-10-16 RX ADMIN — LAMOTRIGINE 100 MG: 100 TABLET ORAL at 20:01

## 2021-10-16 RX ADMIN — IPRATROPIUM BROMIDE AND ALBUTEROL SULFATE 3 ML: 2.5; .5 SOLUTION RESPIRATORY (INHALATION) at 08:47

## 2021-10-16 RX ADMIN — LAMOTRIGINE 100 MG: 100 TABLET ORAL at 09:43

## 2021-10-16 RX ADMIN — TRAZODONE HYDROCHLORIDE 50 MG: 50 TABLET ORAL at 20:01

## 2021-10-16 RX ADMIN — PAROXETINE HYDROCHLORIDE 30 MG: 30 TABLET, FILM COATED ORAL at 09:43

## 2021-10-16 RX ADMIN — ENOXAPARIN SODIUM 30 MG: 30 INJECTION SUBCUTANEOUS at 09:43

## 2021-10-16 RX ADMIN — Medication 1000 UNITS: at 20:01

## 2021-10-16 RX ADMIN — HYDROCODONE BITARTRATE AND ACETAMINOPHEN 1 TABLET: 7.5; 325 TABLET ORAL at 15:17

## 2021-10-16 RX ADMIN — METOPROLOL TARTRATE 25 MG: 25 TABLET, FILM COATED ORAL at 20:01

## 2021-10-16 RX ADMIN — SENNOSIDES 3 TABLET: 8.6 TABLET, FILM COATED ORAL at 20:02

## 2021-10-16 RX ADMIN — BACLOFEN 10 MG: 10 TABLET ORAL at 09:43

## 2021-10-16 NOTE — PLAN OF CARE
Goal Outcome Evaluation:              Outcome Summary: Pt POD #5 R. Femur IM [place]. Pt nonverbal. VSS. Incontinent of bowel and bladder. Contractions of extremities. Pain given once on this shift for nonverbal cues of pain, moaning and groaning. Medications crushed and given in vanilla pudding, pt indicates preference by smiling and finishing pudding whole. Plans to d/c pending. Will continue to monitor.

## 2021-10-16 NOTE — PLAN OF CARE
Goal Outcome Evaluation:  Plan of Care Reviewed With: patient        Progress: no change  Outcome Summary: vss, rt femur dsgs intact, has contractions of extremities, turned every 2 hours, incontinent of urine, mostly nonverbal, medicated for pain once, discharge plan is for SNF

## 2021-10-16 NOTE — PROGRESS NOTES
Name: Ned Basilio ADMIT: 10/10/2021   : 1955  PCP: Seth Villanueva MD    MRN: [id number] LOS: 5 days   AGE/SEX: 65 y.o. male  ROOM: [facility]     Subjective   Subjective   Patient nonverbal.  There are no reports of fever or chills.  Tolerating modified diet well.  No reports of pain.  No reports of seizures       Objective   Objective   Vital Signs  Temp:  [97.5 °F (36.4 °C)-98.5 °F (36.9 °C)] 98.5 °F (36.9 °C)  Heart Rate:  [66-79] 79  Resp:  [18] 18  BP: (146-182)/(80-93) 182/93  SpO2:  [92 %-100 %] 98 %  on  Flow (L/min):  [1-4] 4;   Device (Oxygen Therapy): room air    Intake/Output Summary (Last 24 hours) at 10/16/2021 1515  Last data filed at 10/16/2021 1216  Gross per 24 hour   Intake 1260 ml   Output --   Net 1260 ml     Body mass index is 38.96 kg/m².      10/11/21  0145   Weight: 90.5 kg (199 lb 8 oz)     Physical Exam  General.  Middle-aged gentleman.  Obese.  Alert.  Cannot  on orientation.  Eyes.  No pallor or jaundice.  Pupils equal round and reactive.  Oral cavity.  Patient does not open the mouth for examination.  Neck.  No JVD or lymphadenopathy.  Cardiovascular.  Regular rate and rhythm.  No gallops or murmurs.  Chest.  Poor but clear to auscultation bilaterally with no added sounds  Abdomen.  Distended.  Soft lax.  No tenderness.  No organomegaly.  No guarding or rebound.  Extremities.  +1 bilateral lower/upper extremity edema.  Dressed right thigh wound.    Results Review:      Results from last 7 days   Lab Units 10/15/21  0522 10/14/21  0504 10/13/21  0710 10/12/21  0521 10/10/21  2218   SODIUM mmol/L 141 143 140 137 140   POTASSIUM mmol/L 3.7 3.8 3.7 4.3 4.1   CHLORIDE mmol/L 105 108* 104 103 103   CO2 mmol/L 26.4 28.8 28.2 26.6 29.2*   BUN mg/dL 6* 6* 7* 8 5*   CREATININE mg/dL 0.46* 0.47* 0.53* 0.61* 0.78   GLUCOSE mg/dL 75 92 83 134* 106*   CALCIUM mg/dL 8.2* 8.0* 7.9* 7.7* 8.8   AST (SGOT) U/L  --   --   --   --  18   ALT (SGPT) U/L  --   --   --   --  17      Estimated Creatinine Clearance: 151.9 mL/min (A) (by C-G formula based on SCr of 0.46 mg/dL (L)).                            Invalid input(s):  PHOS        Invalid input(s): LDLCALC  Results from last 7 days   Lab Units 10/16/21  0502 10/15/21  0522 10/15/21  0522 10/14/21  1941 10/14/21  0504 10/13/21  1819 10/13/21  0710 10/13/21  0710 10/12/21  1139 10/12/21  0521 10/12/21  0521 10/10/21  2218 10/10/21  2218   WBC 10*3/mm3 7.13  --  6.65  --   --   --   --  7.73  --   --  10.49  --  11.23*   HEMOGLOBIN g/dL 10.0*  --  8.7* 9.7* 7.1* 7.5*  --  8.3* 7.9*   < > 8.3*   < > 13.6   HEMATOCRIT % 30.7*  --  26.1* 28.4* 21.4* 22.2*  --  24.7* 24.9*   < > 24.8*   < > 42.2   PLATELETS 10*3/mm3 181  --  166  --   --   --   --  155  --   --  201  --  245   MCV fL 96.2   < > 95.3  --   --   --    < > 94.3  --    < > 96.5   < > 99.5*   MCH pg 31.3   < > 31.8  --   --   --    < > 31.7  --    < > 32.3   < > 32.1   MCHC g/dL 32.6   < > 33.3  --   --   --    < > 33.6  --    < > 33.5   < > 32.2   RDW % 12.6   < > 13.2  --   --   --    < > 13.0  --    < > 12.1*   < > 12.6   RDW-SD fl 43.4   < > 45.9  --   --   --    < > 44.9  --    < > 42.3   < > 46.9   MPV fL 10.4   < > 10.3  --   --   --    < > 10.1  --    < > 10.2   < > 10.2   NEUTROPHIL % % 55.5   < > 56.2  --   --   --    < > 51.4  --    < > 76.6*   < > 74.3   LYMPHOCYTE % % 25.0   < > 26.2  --   --   --    < > 28.5  --    < > 12.5*   < > 15.2*   MONOCYTES % % 13.2*   < > 11.7  --   --   --    < > 14.9*  --    < > 10.3   < > 7.8   EOSINOPHIL % % 4.9   < > 4.7  --   --   --    < > 4.1  --    < > 0.0*   < > 2.0   BASOPHIL % % 0.7   < > 0.6  --   --   --    < > 0.6  --    < > 0.1   < > 0.4   IMM GRAN % % 0.7*   < > 0.6*  --   --   --    < > 0.5  --    < > 0.5   < > 0.3   NEUTROS ABS 10*3/mm3 3.96   < > 3.74  --   --   --    < > 3.97  --    < > 8.04*   < > 8.33*   LYMPHS ABS 10*3/mm3 1.78   < > 1.74  --   --   --    < > 2.20  --    < > 1.31   < > 1.71   MONOS ABS 10*3/mm3 0.94*    < > 0.78  --   --   --    < > 1.15*  --    < > 1.08*   < > 0.88   EOS ABS 10*3/mm3 0.35   < > 0.31  --   --   --    < > 0.32  --    < > 0.00   < > 0.23   BASOS ABS 10*3/mm3 0.05   < > 0.04  --   --   --    < > 0.05  --    < > 0.01   < > 0.05   IMMATURE GRANS (ABS) 10*3/mm3 0.05   < > 0.04  --   --   --    < > 0.04  --    < > 0.05   < > 0.03   NRBC /100 WBC 0.4*   < > 0.5*  --   --   --    < > 0.0  --    < > 0.0   < > 0.0    < > = values in this interval not displayed.     Results from last 7 days   Lab Units 10/10/21  2218   INR  1.01         Results from last 7 days   Lab Units 10/10/21  2218   PROCALCITONIN ng/mL 0.03                               Imaging:  Imaging Results (Last 24 Hours)     ** No results found for the last 24 hours. **             I reviewed the patient's new clinical results / labs / tests / procedures      Assessment/Plan     Active Hospital Problems    Diagnosis  POA   • **Closed displaced oblique fracture of shaft of right femur ([facility]) [S72.331A]  Yes   • ABLA (acute blood loss anemia) [D62]  Unknown   • Sommers's esophagus [K22.70]  Unknown   • Chronic constipation [K59.09]  Yes   • Seizure disorder ([facility]) [G40.909]  Yes   • Cerebral palsy ([facility]) [G80.9]  Yes   • Parkinson disease ([facility]) [G20]  Yes   • Osteoporosis [M81.0]  Yes   • Chronic respiratory failure with hypoxia ([facility]) [J96.11]  Yes   • Hypothyroidism [E03.9]  Yes   • Coronary artery disease [I25.10]  Yes      Resolved Hospital Problems    Diagnosis Date Resolved POA   • Leukocytosis [D72.829] 10/12/2021 Yes     CMP is pending      1.  Right proximal femur shaft fracture status post open reduction internal fixation.  On VTE prophylaxis with Lovenox.  Will require a total of 21 days of Lovenox.  2.  Acute postoperative blood loss anemia.  Status post transfusion.    Appropriate hemoglobin response continue to monitor.    3.  Chronic constipation/Sommers's esophagus/dysphagia.  Stable.  Benign GI examination.  Continue bowel regimen.   Continue Protonix.  Continue modified diet.  4.  History of cerebral palsy/seizure disorder/Parkinson's disease/muscle contractures.  Appears to be clinically stable.  On baclofen/Dantrium/Lamictal/Keppra/trazodone  4.  Chronic respiratory failure with hypoxemia continue oxygen/Singulair/DuoNeb's.  Stable respiratory status.  5.  History of coronary artery disease.  Patient is clinically stable on Isordil/metoprolol.  Will initiate aspirin.  5.  Hypothyroidism.  Continue current replacement.  6.  Disposition.  Awaiting placement to a skilled facility.             Desaen Richardson MD  [facility]  10/16/21  15:15 EDT

## 2021-10-17 LAB
ALBUMIN SERPL-MCNC: 3.1 G/DL (ref 3.5–5.2)
ALBUMIN/GLOB SERPL: 1.3 G/DL
ALP SERPL-CCNC: 62 U/L (ref 39–117)
ALT SERPL W P-5'-P-CCNC: 16 U/L (ref 1–41)
ANION GAP SERPL CALCULATED.3IONS-SCNC: 9.7 MMOL/L (ref 5–15)
AST SERPL-CCNC: 20 U/L (ref 1–40)
BILIRUB SERPL-MCNC: 0.5 MG/DL (ref 0–1.2)
BUN SERPL-MCNC: 7 MG/DL (ref 8–23)
BUN/CREAT SERPL: 14 (ref 7–25)
CALCIUM SPEC-SCNC: 8.5 MG/DL (ref 8.6–10.5)
CHLORIDE SERPL-SCNC: 104 MMOL/L (ref 98–107)
CO2 SERPL-SCNC: 28.3 MMOL/L (ref 22–29)
CREAT SERPL-MCNC: 0.5 MG/DL (ref 0.76–1.27)
GFR SERPL CREATININE-BSD FRML MDRD: >150 ML/MIN/1.73
GLOBULIN UR ELPH-MCNC: 2.4 GM/DL
GLUCOSE SERPL-MCNC: 97 MG/DL (ref 65–99)
POTASSIUM SERPL-SCNC: 3.6 MMOL/L (ref 3.5–5.2)
PROT SERPL-MCNC: 5.5 G/DL (ref 6–8.5)
SODIUM SERPL-SCNC: 142 MMOL/L (ref 136–145)

## 2021-10-17 PROCEDURE — 94799 UNLISTED PULMONARY SVC/PX: CPT

## 2021-10-17 PROCEDURE — 25010000002 ENOXAPARIN PER 10 MG: Performed by: NURSE PRACTITIONER

## 2021-10-17 RX ADMIN — IPRATROPIUM BROMIDE AND ALBUTEROL SULFATE 3 ML: 2.5; .5 SOLUTION RESPIRATORY (INHALATION) at 23:11

## 2021-10-17 RX ADMIN — LAMOTRIGINE 100 MG: 100 TABLET ORAL at 20:28

## 2021-10-17 RX ADMIN — LAMOTRIGINE 100 MG: 100 TABLET ORAL at 09:11

## 2021-10-17 RX ADMIN — POLYETHYLENE GLYCOL 3350 17 G: 17 POWDER, FOR SOLUTION ORAL at 09:10

## 2021-10-17 RX ADMIN — SODIUM CHLORIDE, POTASSIUM CHLORIDE, SODIUM LACTATE AND CALCIUM CHLORIDE 75 ML/HR: 600; 310; 30; 20 INJECTION, SOLUTION INTRAVENOUS at 14:10

## 2021-10-17 RX ADMIN — ENOXAPARIN SODIUM 30 MG: 30 INJECTION SUBCUTANEOUS at 09:11

## 2021-10-17 RX ADMIN — MICONAZOLE NITRATE: 2 POWDER TOPICAL at 09:12

## 2021-10-17 RX ADMIN — PANTOPRAZOLE SODIUM 40 MG: 40 TABLET, DELAYED RELEASE ORAL at 05:57

## 2021-10-17 RX ADMIN — Medication 1000 UNITS: at 09:11

## 2021-10-17 RX ADMIN — DANTROLENE SODIUM 25 MG: 25 CAPSULE ORAL at 09:12

## 2021-10-17 RX ADMIN — PAROXETINE HYDROCHLORIDE 30 MG: 30 TABLET, FILM COATED ORAL at 09:11

## 2021-10-17 RX ADMIN — LEVETIRACETAM 1000 MG: 500 TABLET, FILM COATED ORAL at 20:28

## 2021-10-17 RX ADMIN — ASPIRIN 81 MG: 81 TABLET, COATED ORAL at 09:09

## 2021-10-17 RX ADMIN — LEVOTHYROXINE SODIUM 125 MCG: 0.12 TABLET ORAL at 05:57

## 2021-10-17 RX ADMIN — HYDROCODONE BITARTRATE AND ACETAMINOPHEN 1 TABLET: 7.5; 325 TABLET ORAL at 20:28

## 2021-10-17 RX ADMIN — LEVETIRACETAM 1000 MG: 500 TABLET, FILM COATED ORAL at 09:10

## 2021-10-17 RX ADMIN — BISACODYL 10 MG: 10 SUPPOSITORY RECTAL at 09:10

## 2021-10-17 RX ADMIN — MONTELUKAST SODIUM 10 MG: 10 TABLET, FILM COATED ORAL at 20:28

## 2021-10-17 RX ADMIN — BACLOFEN 10 MG: 10 TABLET ORAL at 20:28

## 2021-10-17 RX ADMIN — TRAZODONE HYDROCHLORIDE 50 MG: 50 TABLET ORAL at 20:27

## 2021-10-17 RX ADMIN — MICONAZOLE NITRATE: 2 POWDER TOPICAL at 21:00

## 2021-10-17 RX ADMIN — FLUTICASONE PROPIONATE 1 SPRAY: 50 SPRAY, METERED NASAL at 09:10

## 2021-10-17 RX ADMIN — SENNOSIDES 3 TABLET: 8.6 TABLET, FILM COATED ORAL at 20:27

## 2021-10-17 RX ADMIN — HYDROCODONE BITARTRATE AND ACETAMINOPHEN 1 TABLET: 7.5; 325 TABLET ORAL at 09:19

## 2021-10-17 RX ADMIN — SENNOSIDES 3 TABLET: 8.6 TABLET, FILM COATED ORAL at 09:10

## 2021-10-17 RX ADMIN — Medication 1000 UNITS: at 20:28

## 2021-10-17 RX ADMIN — METOPROLOL TARTRATE 25 MG: 25 TABLET, FILM COATED ORAL at 09:10

## 2021-10-17 RX ADMIN — IPRATROPIUM BROMIDE AND ALBUTEROL SULFATE 3 ML: 2.5; .5 SOLUTION RESPIRATORY (INHALATION) at 16:24

## 2021-10-17 RX ADMIN — ISOSORBIDE DINITRATE 20 MG: 20 TABLET ORAL at 02:12

## 2021-10-17 RX ADMIN — METOPROLOL TARTRATE 25 MG: 25 TABLET, FILM COATED ORAL at 20:28

## 2021-10-17 RX ADMIN — HYDROCODONE BITARTRATE AND ACETAMINOPHEN 1 TABLET: 7.5; 325 TABLET ORAL at 14:12

## 2021-10-17 RX ADMIN — HYDROCODONE BITARTRATE AND ACETAMINOPHEN 1 TABLET: 7.5; 325 TABLET ORAL at 02:15

## 2021-10-17 RX ADMIN — ISOSORBIDE DINITRATE 20 MG: 20 TABLET ORAL at 14:12

## 2021-10-17 RX ADMIN — BACLOFEN 10 MG: 10 TABLET ORAL at 09:11

## 2021-10-17 RX ADMIN — SODIUM CHLORIDE, POTASSIUM CHLORIDE, SODIUM LACTATE AND CALCIUM CHLORIDE 75 ML/HR: 600; 310; 30; 20 INJECTION, SOLUTION INTRAVENOUS at 02:11

## 2021-10-17 NOTE — PLAN OF CARE
Goal Outcome Evaluation:               POD #6 R femur IM nailing after fall from lift at group home, CDI, IV fluids continue at 75/cc hr, voiding incontinently per brief, lungs diminished (pulmonology following), attempted to wean from O2 this shift, but unable to keep sats above 90, purred diet, meds crushed and given in pudding, pain meds x 2, awaiting rehab placement

## 2021-10-17 NOTE — PROGRESS NOTES
Name: Ned Basilio ADMIT: 10/10/2021   : 1955  PCP: Seth Villanueva MD    MRN: [id number] LOS: 6 days   AGE/SEX: 65 y.o. male  ROOM: [facility]/     Subjective   Subjective   Patient nonverbal.  History from the nurse.  Normal fever or chills.  Tolerating modified diet well.  No reports of chest pain/shortness of breath/cough/abdominal pain/nausea or vomiting.  Patient is incontinent of the bowel and the urine.  Positive bowel movement without fresh bright blood per rectum or melena.  No reports of seizures       Objective   Objective   Vital Signs  Temp:  [96.7 °F (35.9 °C)-98.5 °F (36.9 °C)] 97.7 °F (36.5 °C)  Heart Rate:  [63-86] 86  Resp:  [16-18] 16  BP: (123-182)/(67-93) 143/81  SpO2:  [89 %-99 %] 89 %  on  Flow (L/min):  [1.5-4] 1.5;   Device (Oxygen Therapy): nasal cannula    Intake/Output Summary (Last 24 hours) at 10/17/2021 1251  Last data filed at 10/17/2021 0900  Gross per 24 hour   Intake 1792.25 ml   Output --   Net 1792.25 ml     Body mass index is 38.96 kg/m².      10/11/21  0145   Weight: 90.5 kg (199 lb 8 oz)     Physical Exam    General.  Middle-aged gentleman.  Obese.  Somnolent today.  Nonverbal. Cannot  on orientation.  Eyes.  No pallor or jaundice.  Pupils equal round and reactive.  Oral cavity.  Patient does not open the mouth for examination.  Neck.  No JVD or lymphadenopathy.  Cardiovascular.  Regular rate and rhythm.  No gallops or murmurs.  Chest.  Poor but clear to auscultation bilaterally with no added sounds  Abdomen.  Distended.  Soft lax.  No tenderness.  No organomegaly.  No guarding or rebound.  Extremities.  +1 bilateral lower/upper extremity edema.  Dressed right thigh wound.  CNS.  Quadriplegia    Results Review:      Results from last 7 days   Lab Units 10/16/21  2258 10/15/21  0522 10/14/21  0504 10/13/21  0710 10/12/21  0521 10/10/21  2218   SODIUM mmol/L 142 141 143 140 137 140   POTASSIUM mmol/L 3.6 3.7 3.8 3.7 4.3 4.1   CHLORIDE mmol/L 104 105 108*  104 103 103   CO2 mmol/L 28.3 26.4 28.8 28.2 26.6 29.2*   BUN mg/dL 7* 6* 6* 7* 8 5*   CREATININE mg/dL 0.50* 0.46* 0.47* 0.53* 0.61* 0.78   GLUCOSE mg/dL 97 75 92 83 134* 106*   CALCIUM mg/dL 8.5* 8.2* 8.0* 7.9* 7.7* 8.8   AST (SGOT) U/L 20  --   --   --   --  18   ALT (SGPT) U/L 16  --   --   --   --  17     Estimated Creatinine Clearance: 139.8 mL/min (A) (by C-G formula based on SCr of 0.5 mg/dL (L)).                            Invalid input(s):  PHOS        Invalid input(s): LDLCALC  Results from last 7 days   Lab Units 10/16/21  2258 10/16/21  0502 10/16/21  0502 10/15/21  0522 10/15/21  0522 10/14/21  1941 10/14/21  0504 10/13/21  1819 10/13/21  0710 10/13/21  0710 10/12/21  1139 10/12/21  0521 10/10/21  2218 10/10/21  2218   WBC 10*3/mm3 6.14  --  7.13  --  6.65  --   --   --   --  7.73  --  10.49  --  11.23*   HEMOGLOBIN g/dL 8.2*  --  10.0*  --  8.7* 9.7* 7.1* 7.5*  --  8.3*   < > 8.3*   < > 13.6   HEMATOCRIT % 24.9*  --  30.7*  --  26.1* 28.4* 21.4* 22.2*  --  24.7*   < > 24.8*   < > 42.2   PLATELETS 10*3/mm3 195  --  181  --  166  --   --   --   --  155  --  201  --  245   MCV fL 94.7   < > 96.2   < > 95.3  --   --   --    < > 94.3  --  96.5   < > 99.5*   MCH pg 31.2   < > 31.3   < > 31.8  --   --   --    < > 31.7  --  32.3   < > 32.1   MCHC g/dL 32.9   < > 32.6   < > 33.3  --   --   --    < > 33.6  --  33.5   < > 32.2   RDW % 12.7   < > 12.6   < > 13.2  --   --   --    < > 13.0  --  12.1*   < > 12.6   RDW-SD fl 43.0   < > 43.4   < > 45.9  --   --   --    < > 44.9  --  42.3   < > 46.9   MPV fL 10.1   < > 10.4   < > 10.3  --   --   --    < > 10.1  --  10.2   < > 10.2   NEUTROPHIL % % 54.2   < > 55.5   < > 56.2  --   --   --    < > 51.4  --  76.6*   < > 74.3   LYMPHOCYTE % % 28.2   < > 25.0   < > 26.2  --   --   --    < > 28.5  --  12.5*   < > 15.2*   MONOCYTES % % 11.7   < > 13.2*   < > 11.7  --   --   --    < > 14.9*  --  10.3   < > 7.8   EOSINOPHIL % % 4.2   < > 4.9   < > 4.7  --   --   --    < > 4.1   --  0.0*   < > 2.0   BASOPHIL % % 0.7   < > 0.7   < > 0.6  --   --   --    < > 0.6  --  0.1   < > 0.4   IMM GRAN % % 1.0*   < > 0.7*   < > 0.6*  --   --   --    < > 0.5  --  0.5   < > 0.3   NEUTROS ABS 10*3/mm3 3.33   < > 3.96   < > 3.74  --   --   --    < > 3.97  --  8.04*   < > 8.33*   LYMPHS ABS 10*3/mm3 1.73   < > 1.78   < > 1.74  --   --   --    < > 2.20  --  1.31   < > 1.71   MONOS ABS 10*3/mm3 0.72   < > 0.94*   < > 0.78  --   --   --    < > 1.15*  --  1.08*   < > 0.88   EOS ABS 10*3/mm3 0.26   < > 0.35   < > 0.31  --   --   --    < > 0.32  --  0.00   < > 0.23   BASOS ABS 10*3/mm3 0.04   < > 0.05   < > 0.04  --   --   --    < > 0.05  --  0.01   < > 0.05   IMMATURE GRANS (ABS) 10*3/mm3 0.06*   < > 0.05   < > 0.04  --   --   --    < > 0.04  --  0.05   < > 0.03   NRBC /100 WBC 0.5*   < > 0.4*   < > 0.5*  --   --   --    < > 0.0  --  0.0   < > 0.0    < > = values in this interval not displayed.     Results from last 7 days   Lab Units 10/10/21  2218   INR  1.01         Results from last 7 days   Lab Units 10/10/21  2218   PROCALCITONIN ng/mL 0.03                               Imaging:  Imaging Results (Last 24 Hours)     ** No results found for the last 24 hours. **             I reviewed the patient's new clinical results / labs / tests / procedures      Assessment/Plan     Active Hospital Problems    Diagnosis  POA   • **Closed displaced oblique fracture of shaft of right femur (HCC) [S72.331A]  Yes   • ABLA (acute blood loss anemia) [D62]  Unknown   • Sommers's esophagus [K22.70]  Unknown   • Chronic constipation [K59.09]  Yes   • Seizure disorder (HCC) [G40.909]  Yes   • Cerebral palsy (HCC) [G80.9]  Yes   • Parkinson disease (HCC) [G20]  Yes   • Osteoporosis [M81.0]  Yes   • Chronic respiratory failure with hypoxia (HCC) [J96.11]  Yes   • Hypothyroidism [E03.9]  Yes   • Coronary artery disease [I25.10]  Yes      Resolved Hospital Problems    Diagnosis Date Resolved POA   • Leukocytosis [D72.829] 10/12/2021  Yes     CMP is pending      1.  Right proximal femur shaft fracture status post open reduction internal fixation.  On VTE prophylaxis with Lovenox.  Will require a total of 21 days of Lovenox.  2.  Acute postoperative blood loss anemia.  Status post transfusion.    Appropriate hemoglobin response continue to monitor.  Hemoglobin is stable.  3.  Chronic constipation/Sommers's esophagus/dysphagia.  Stable.  Benign GI examination.  Continue bowel regimen.  Continue Protonix.  Continue modified diet.  4.  History of cerebral palsy/seizure disorder/Parkinson's disease/muscle contractures.  Appears to be clinically stable.  On baclofen/Dantrium/Lamictal/Keppra/trazodone  4.  Chronic respiratory failure with hypoxemia continue oxygen/Singulair/DuoNeb's.  Stable respiratory status.  5.  History of coronary artery disease.  Patient is clinically stable on Isordil/metoprolol.  Will initiate aspirin.  5.  Hypothyroidism.  Continue current replacement.  6.  Disposition.  Awaiting placement to a skilled facility.     Discussed with the patient nurse        Desean Richardson MD  [facility]  10/17/21  12:51 EDT        Review of Systems

## 2021-10-17 NOTE — PLAN OF CARE
Goal Outcome Evaluation:  Plan of Care Reviewed With: patient        Progress: no change  Outcome Summary: vss, rt femur dsg c/d/i, neurovascular status wnl, voiding incontinently, good pain control with analgesic, takes meds crushed in pudding, mostly non-verbal, pt incontinent, discharge plans are pending ,

## 2021-10-18 ENCOUNTER — APPOINTMENT (OUTPATIENT)
Dept: GENERAL RADIOLOGY | Facility: HOSPITAL | Age: 66
End: 2021-10-18

## 2021-10-18 LAB
DEPRECATED RDW RBC AUTO: 47.5 FL (ref 37–54)
ERYTHROCYTE [DISTWIDTH] IN BLOOD BY AUTOMATED COUNT: 13.5 % (ref 12.3–15.4)
HCT VFR BLD AUTO: 26.8 % (ref 37.5–51)
HGB BLD-MCNC: 8.6 G/DL (ref 13–17.7)
MCH RBC QN AUTO: 31 PG (ref 26.6–33)
MCHC RBC AUTO-ENTMCNC: 32.1 G/DL (ref 31.5–35.7)
MCV RBC AUTO: 96.8 FL (ref 79–97)
PLATELET # BLD AUTO: 249 10*3/MM3 (ref 140–450)
PMV BLD AUTO: 10.4 FL (ref 6–12)
RBC # BLD AUTO: 2.77 10*6/MM3 (ref 4.14–5.8)
WBC # BLD AUTO: 8.31 10*3/MM3 (ref 3.4–10.8)

## 2021-10-18 PROCEDURE — 94799 UNLISTED PULMONARY SVC/PX: CPT

## 2021-10-18 PROCEDURE — 74018 RADEX ABDOMEN 1 VIEW: CPT

## 2021-10-18 PROCEDURE — 25010000002 ENOXAPARIN PER 10 MG: Performed by: NURSE PRACTITIONER

## 2021-10-18 PROCEDURE — 99024 POSTOP FOLLOW-UP VISIT: CPT | Performed by: ORTHOPAEDIC SURGERY

## 2021-10-18 PROCEDURE — 85027 COMPLETE CBC AUTOMATED: CPT | Performed by: INTERNAL MEDICINE

## 2021-10-18 RX ORDER — LACTULOSE 10 G/15ML
10 SOLUTION ORAL ONCE AS NEEDED
Status: DISCONTINUED | OUTPATIENT
Start: 2021-10-18 | End: 2021-10-20 | Stop reason: HOSPADM

## 2021-10-18 RX ADMIN — Medication 1000 UNITS: at 21:37

## 2021-10-18 RX ADMIN — LEVETIRACETAM 1000 MG: 500 TABLET, FILM COATED ORAL at 08:51

## 2021-10-18 RX ADMIN — SENNOSIDES 3 TABLET: 8.6 TABLET, FILM COATED ORAL at 21:38

## 2021-10-18 RX ADMIN — FLUTICASONE PROPIONATE 1 SPRAY: 50 SPRAY, METERED NASAL at 08:52

## 2021-10-18 RX ADMIN — METOPROLOL TARTRATE 25 MG: 25 TABLET, FILM COATED ORAL at 08:53

## 2021-10-18 RX ADMIN — PANTOPRAZOLE SODIUM 40 MG: 40 TABLET, DELAYED RELEASE ORAL at 06:46

## 2021-10-18 RX ADMIN — SODIUM CHLORIDE, PRESERVATIVE FREE 3 ML: 5 INJECTION INTRAVENOUS at 21:44

## 2021-10-18 RX ADMIN — IPRATROPIUM BROMIDE AND ALBUTEROL SULFATE 3 ML: 2.5; .5 SOLUTION RESPIRATORY (INHALATION) at 16:12

## 2021-10-18 RX ADMIN — Medication 1000 UNITS: at 08:51

## 2021-10-18 RX ADMIN — MICONAZOLE NITRATE: 2 POWDER TOPICAL at 21:42

## 2021-10-18 RX ADMIN — HYDROCODONE BITARTRATE AND ACETAMINOPHEN 1 TABLET: 7.5; 325 TABLET ORAL at 11:42

## 2021-10-18 RX ADMIN — LAMOTRIGINE 100 MG: 100 TABLET ORAL at 08:51

## 2021-10-18 RX ADMIN — LAMOTRIGINE 100 MG: 100 TABLET ORAL at 21:38

## 2021-10-18 RX ADMIN — ENOXAPARIN SODIUM 30 MG: 30 INJECTION SUBCUTANEOUS at 08:51

## 2021-10-18 RX ADMIN — MICONAZOLE NITRATE: 2 POWDER TOPICAL at 08:52

## 2021-10-18 RX ADMIN — ISOSORBIDE DINITRATE 20 MG: 20 TABLET ORAL at 14:45

## 2021-10-18 RX ADMIN — IPRATROPIUM BROMIDE AND ALBUTEROL SULFATE 3 ML: 2.5; .5 SOLUTION RESPIRATORY (INHALATION) at 10:00

## 2021-10-18 RX ADMIN — LEVETIRACETAM 1000 MG: 500 TABLET, FILM COATED ORAL at 21:38

## 2021-10-18 RX ADMIN — DOCUSATE SODIUM 100 MG: 100 CAPSULE, LIQUID FILLED ORAL at 08:51

## 2021-10-18 RX ADMIN — POLYETHYLENE GLYCOL 3350 17 G: 17 POWDER, FOR SOLUTION ORAL at 08:51

## 2021-10-18 RX ADMIN — LEVOTHYROXINE SODIUM 125 MCG: 0.12 TABLET ORAL at 06:46

## 2021-10-18 RX ADMIN — IPRATROPIUM BROMIDE AND ALBUTEROL SULFATE 3 ML: 2.5; .5 SOLUTION RESPIRATORY (INHALATION) at 21:18

## 2021-10-18 RX ADMIN — PAROXETINE HYDROCHLORIDE 30 MG: 30 TABLET, FILM COATED ORAL at 08:51

## 2021-10-18 RX ADMIN — ASPIRIN 81 MG: 81 TABLET, COATED ORAL at 08:51

## 2021-10-18 RX ADMIN — BACLOFEN 10 MG: 10 TABLET ORAL at 21:38

## 2021-10-18 RX ADMIN — HYDROCODONE BITARTRATE AND ACETAMINOPHEN 1 TABLET: 7.5; 325 TABLET ORAL at 22:57

## 2021-10-18 RX ADMIN — SODIUM CHLORIDE, PRESERVATIVE FREE 3 ML: 5 INJECTION INTRAVENOUS at 09:30

## 2021-10-18 RX ADMIN — DANTROLENE SODIUM 25 MG: 25 CAPSULE ORAL at 08:51

## 2021-10-18 RX ADMIN — TRAZODONE HYDROCHLORIDE 50 MG: 50 TABLET ORAL at 21:38

## 2021-10-18 RX ADMIN — BACLOFEN 10 MG: 10 TABLET ORAL at 08:51

## 2021-10-18 RX ADMIN — MONTELUKAST SODIUM 10 MG: 10 TABLET, FILM COATED ORAL at 21:38

## 2021-10-18 RX ADMIN — SENNOSIDES 3 TABLET: 8.6 TABLET, FILM COATED ORAL at 08:51

## 2021-10-18 RX ADMIN — METOPROLOL TARTRATE 25 MG: 25 TABLET, FILM COATED ORAL at 21:38

## 2021-10-18 RX ADMIN — HYDROCODONE BITARTRATE AND ACETAMINOPHEN 1 TABLET: 7.5; 325 TABLET ORAL at 18:15

## 2021-10-18 RX ADMIN — BISACODYL 10 MG: 10 SUPPOSITORY RECTAL at 08:51

## 2021-10-18 RX ADMIN — DOCUSATE SODIUM 100 MG: 100 CAPSULE, LIQUID FILLED ORAL at 21:38

## 2021-10-18 NOTE — PROGRESS NOTES
Orthopedic Progress Note      Patient: Ned Basilio    YOB: 1955    Medical Record Number: [id number]    Attending Physician: Desean Richardson MD    Date of Admission: 10/10/2021  8:24 PM    Admitting Dx:  Closed displaced oblique fracture of shaft of right femur, initial encounter ([facility]) [S72.331A]  Cerebral palsy, unspecified type ([facility]) [G80.9]    Status Post: Intramedullary fixation of right proximal femoral shaft fracture    Post Operative Day Number: 6    Current Problem List:   Patient Active Problem List   Diagnosis    History of colon polyps    Colon cancer screening    Complicated UTI (urinary tract infection)    Coronary artery disease    Fever    HCAP (healthcare-associated pneumonia)    HLD (hyperlipidemia)    Hypothyroidism    Septic shock ([facility])    Tracheal compression    Closed displaced oblique fracture of shaft of right femur ([facility])    Cerebral palsy ([facility])    Parkinson disease ([facility])    Osteoporosis    Chronic respiratory failure with hypoxia ([facility])    Chronic constipation    Seizure disorder ([facility])    ABLA (acute blood loss anemia)    Sommers's esophagus         Past Medical History:   Diagnosis Date    Anxiety     Arthritis     Sommers's esophagus     Cardiomegaly     Cerebral palsy ([facility])     Constipation     Disease of thyroid gland     GERD (gastroesophageal reflux disease)     Hyperlipidemia     Osteoporosis     Parkinson disease ([facility])     Pre-operative cardiovascular examination     Raynaud's disease     Right hemiplegia ([facility])     Seizures ([facility])        Current Medications:  Scheduled Meds:aspirin, 81 mg, Oral, Daily  baclofen, 10 mg, Oral, BID  bisacodyl, 10 mg, Rectal, Daily  cholecalciferol, 1,000 Units, Oral, BID  dantrolene, 25 mg, Oral, Daily  docusate sodium, 100 mg, Oral, BID  enoxaparin, 30 mg, Subcutaneous, Q24H  fluticasone, 1 spray, Each Nare, Daily  ipratropium-albuterol, 3 mL, Nebulization, TID  isosorbide dinitrate, 20 mg, Oral, Q12H  lamoTRIgine, 100 mg, Oral,  BID  levETIRAcetam, 1,000 mg, Oral, BID  levothyroxine, 125 mcg, Oral, QAM  metoprolol tartrate, 25 mg, Oral, Q12H  miconazole, , Topical, BID  montelukast, 10 mg, Oral, Nightly  pantoprazole, 40 mg, Oral, QAM  PARoxetine, 30 mg, Oral, Daily  polyethylene glycol, 17 g, Oral, Daily  senna, 3 tablet, Oral, BID  sodium chloride, 3 mL, Intravenous, Q12H  traZODone, 50 mg, Oral, Nightly  vitamin D, 50,000 Units, Oral, Q7 Days      PRN Meds:.  acetaminophen    acetaminophen    albuterol    bisacodyl    HYDROcodone-acetaminophen    HYDROcodone-acetaminophen    melatonin    [] HYDROmorphone **AND** naloxone    ondansetron    ondansetron **OR** ondansetron    [COMPLETED] Insert peripheral IV **AND** sodium chloride    [COMPLETED] Insert peripheral IV **AND** sodium chloride    sodium chloride    SUBJECTIVE: 65 y.o.  male.  Awake, nonverbal and does not appear to be in any acute distress    OBJECTIVE:   Vitals:    10/18/21 0252 10/18/21 0700 10/18/21 1000 10/18/21 1005   BP: 123/69 130/75     BP Location: Left arm Right arm     Patient Position: Lying Lying     Pulse: 78 83 73 71   Resp: 16 16 16 16   Temp: 97.1 °F (36.2 °C) 96.9 °F (36.1 °C)     TempSrc: Skin Skin     SpO2: 97% 93% 97%    Weight:       Height:         I/O last 3 completed shifts:  In: 1517.3 [P.O.:605; I.V.:912.3]  Out: -     Diagnostic Tests:   Lab Results (last 24 hours)       Procedure Component Value Units Date/Time    CBC (No Diff) [[id number]]  (Abnormal) Collected: 10/18/21 0614    Specimen: Blood Updated: 10/18/21 0643     WBC 8.31 10*3/mm3      RBC 2.77 10*6/mm3      Hemoglobin 8.6 g/dL      Hematocrit 26.8 %      MCV 96.8 fL      MCH 31.0 pg      MCHC 32.1 g/dL      RDW 13.5 %      RDW-SD 47.5 fl      MPV 10.4 fL      Platelets 249 10*3/mm3             PHYSICAL EXAM:  Dressings to right hip and thigh are dry and intact.  Calf soft and nontender.  Hg 8.6.  Remains on oxygen.  Lungs clear with decreased BS throughout.  Appears comfortable at  present.       ASSESSMENT & PLAN:  Group home cannot accept him back until he able to bear weight for transfers and off oxygen.  Have spoken with CCP and they are working on SNF with Medicaid bed.      Hopefully bed will become available in the next few days.          Date: 10/18/2021    Christal Otero RN      I have reviewed and agree with the above.    Josse Carrillo MD

## 2021-10-18 NOTE — PLAN OF CARE
Goal Outcome Evaluation:  Plan of Care Reviewed With: patient        Progress: improving  Outcome Summary: vss. 2L. dsg cdi. q2turn. baseline bedridden and nonverbal. voiding per pw/brief. med crushed in applesauce/pudding. pain tolerated with po prn meds. plan to dc to snf when appropriate.

## 2021-10-18 NOTE — PROGRESS NOTES
Name: Ned Basilio ADMIT: 10/10/2021   : 1955  PCP: Seth Villanueva MD    MRN: [id number] LOS: 7 days   AGE/SEX: 65 y.o. male  ROOM: [facility]     Subjective   Subjective   Patient appears chronically ill, obese, contracted, relatively comfortable, and in no apparent distress.  Grimaces with abdominal palpitation.  Nonverbal.  Discussed with RN--tolerating p.o. albeit decreased intake.    Review of Systems     Objective   Objective   Vital Signs  Temp:  [93.7 °F (34.3 °C)-97.3 °F (36.3 °C)] 97.3 °F (36.3 °C)  Heart Rate:  [63-83] 74  Resp:  [16-18] 16  BP: (114-157)/(63-82) 157/82  SpO2:  [93 %-98 %] 93 %  on  Flow (L/min):  [1.5-2.5] 2;   Device (Oxygen Therapy): nasal cannula  Body mass index is 38.96 kg/m².     Physical Exam  Constitutional:       General: He is not in acute distress.     Appearance: He is ill-appearing (Chronic). He is not toxic-appearing.      Comments: Morbidly obese, generally weak, toyin, bedbound   HENT:      Head: Normocephalic and atraumatic.   Eyes:      Conjunctiva/sclera: Conjunctivae normal.   Cardiovascular:      Rate and Rhythm: Normal rate.      Heart sounds: Normal heart sounds.   Pulmonary:      Effort: Pulmonary effort is normal.      Comments: Diminished on expiration throughout lung fields  Abdominal:      General: There is distension.      Palpations: Abdomen is soft.      Tenderness: There is abdominal tenderness (Generalized). There is no guarding.   Musculoskeletal:         General: Tenderness (Proximal previous fracture with surgical repair) present.      Cervical back: Normal range of motion and neck supple.      Right lower leg: Edema (Dependent, nonpitting edema BLE) present.      Left lower leg: Edema present.   Skin:     General: Skin is warm and dry.      Coloration: Skin is pale.   Neurological:      Mental Status: He is alert.      Comments: Unable to ascertain secondary to nonverbal   Psychiatric:      Comments: Unable to ascertain  secondary to nonverbal; does not pose risk for elopement         Results Review     I reviewed the patient's new clinical results.  Results from last 7 days   Lab Units 10/18/21  0614 10/16/21  2258 10/16/21  0502 10/15/21  0522   WBC 10*3/mm3 8.31 6.14 7.13 6.65   HEMOGLOBIN g/dL 8.6* 8.2* 10.0* 8.7*   PLATELETS 10*3/mm3 249 195 181 166     Results from last 7 days   Lab Units 10/16/21  2258 10/15/21  0522 10/14/21  0504 10/13/21  0710   SODIUM mmol/L 142 141 143 140   POTASSIUM mmol/L 3.6 3.7 3.8 3.7   CHLORIDE mmol/L 104 105 108* 104   CO2 mmol/L 28.3 26.4 28.8 28.2   BUN mg/dL 7* 6* 6* 7*   CREATININE mg/dL 0.50* 0.46* 0.47* 0.53*   GLUCOSE mg/dL 97 75 92 83   Estimated Creatinine Clearance: 139.8 mL/min (A) (by C-G formula based on SCr of 0.5 mg/dL (L)).  Results from last 7 days   Lab Units 10/16/21  2258   ALBUMIN g/dL 3.10*   BILIRUBIN mg/dL 0.5   ALK PHOS U/L 62   AST (SGOT) U/L 20   ALT (SGPT) U/L 16     Results from last 7 days   Lab Units 10/16/21  2258 10/15/21  0522 10/14/21  0504 10/13/21  0710   CALCIUM mg/dL 8.5* 8.2* 8.0* 7.9*   ALBUMIN g/dL 3.10*  --   --   --        COVID19   Date Value Ref Range Status   10/10/2021 Not Detected Not Detected - Ref. Range Final     SARS-CoV-2 PCR   Date Value Ref Range Status   05/14/2021 Not Detected Not Detected Final     Comment:     Nucleic acid specific to SARS-CoV-2 (COVID-19) virus was not detected in  this sample by the Aptima (R) SARS-CoV-2 Assay.                SARS-CoV-2 (COVID-19) nucleic acid testing performed using [employer] Aptima (R) SARS-CoV-2 Assay or [employer] TaqPath (TM)  COVID-19 Combo Kit as indicated above under Emergency Use Authorization (EUA) from the FDA. Aptima (R) and TaqPath (TM) test performance  characteristics verified by [employer] in accordance with the FDAs Guidance Document (Policy for Diagnostic Tests for Coronavirus Disease-2019  during the Public Health Emergency) issued on March 16, 2020. The laboratory is  regulated under CLIA as qualified to perform high-complexity testing  Unless otherwise noted, all testing was performed at [facility], CLIA No. [id number], [place] State Licensee No. [id number]     No results found for: HGBA1C, POCGLU    XR Hip With or Without Pelvis 2 - 3 View Right  TWO-VIEW PORTABLE RIGHT HIP IN OR     HISTORY: Internal fixation of fracture     Imaging in the operating room was performed at the time of internal  fixation of a fracture of the proximal femoral shaft with long  intramedullary beatrice and intersecting screws and the alignment appears  satisfactory. 6 images were obtained and the fluoroscopy time measures 6  minutes and 30 seconds.     This report was finalized on 10/11/2021 8:48 PM by Dr. Bryce Mitchell M.D.     FL C Arm During Surgery  This procedure was auto-finalized with no dictation required.    Scheduled Medications  aspirin, 81 mg, Oral, Daily  baclofen, 10 mg, Oral, BID  bisacodyl, 10 mg, Rectal, Daily  cholecalciferol, 1,000 Units, Oral, BID  dantrolene, 25 mg, Oral, Daily  docusate sodium, 100 mg, Oral, BID  enoxaparin, 30 mg, Subcutaneous, Q24H  fluticasone, 1 spray, Each Nare, Daily  ipratropium-albuterol, 3 mL, Nebulization, TID  isosorbide dinitrate, 20 mg, Oral, Q12H  lamoTRIgine, 100 mg, Oral, BID  levETIRAcetam, 1,000 mg, Oral, BID  levothyroxine, 125 mcg, Oral, QAM  metoprolol tartrate, 25 mg, Oral, Q12H  miconazole, , Topical, BID  montelukast, 10 mg, Oral, Nightly  pantoprazole, 40 mg, Oral, QAM  PARoxetine, 30 mg, Oral, Daily  polyethylene glycol, 17 g, Oral, Daily  senna, 3 tablet, Oral, BID  sodium chloride, 3 mL, Intravenous, Q12H  traZODone, 50 mg, Oral, Nightly  vitamin D, 50,000 Units, Oral, Q7 Days    Infusions  lactated ringers, 75 mL/hr, Last Rate: 75 mL/hr (10/17/21 1410)    Diet  Diet Pureed       Assessment/Plan     Active Hospital Problems    Diagnosis  POA   • **Closed displaced oblique fracture of shaft of right femur (HCC) [U81.797A]  Yes   • ABLA (acute  blood loss anemia) [D62]  Unknown   • Sommers's esophagus [K22.70]  Unknown   • Chronic constipation [K59.09]  Yes   • Seizure disorder (HCC) [G40.909]  Yes   • Cerebral palsy (HCC) [G80.9]  Yes   • Parkinson disease (HCC) [G20]  Yes   • Osteoporosis [M81.0]  Yes   • Chronic respiratory failure with hypoxia (HCC) [J96.11]  Yes   • Hypothyroidism [E03.9]  Yes   • Coronary artery disease [I25.10]  Yes      Resolved Hospital Problems    Diagnosis Date Resolved POA   • Leukocytosis [D72.829] 10/12/2021 Yes       65 y.o. male admitted with Closed displaced oblique fracture of shaft of right femur (HCC).      Closed displaced oblique fracture of shaft of right femur (HCC)  S/p Intramedullary fixation of right proximal femoral shaft fracture on 10/12  Enoxaparin 21 days total for VTE PPx      Chronic constipation  Persist despite current bowel regimen  Ordering KUB, lactulose once as needed for constipation, tapwater enema until clear      ABLA (acute blood loss anemia)  Serum Hgb stable (plateaued) s/p 2 units pRBC transfusion      Coronary artery disease / HTN  ASA, Isordil, metoprolol tartrate      Hypothyroidism  Thyroid supplement      Cerebral palsy (HCC) /   Parkinson disease (HCC)  Dantrolene, baclofen      Osteoporosis  Vitamin D      Chronic respiratory failure with hypoxia (HCC)  93% on 2 L NC  Duonebs, flonase      Seizure disorder (HCC)  Keppra, lamictal      Sommers's esophagus  PPI    · Enoxaparin for DVT prophylaxis.  · No CPR  · Discussed with Dr. Richardson.  · Anticipate discharge pending clinical course / CCP following--plan SNF pending acceptance      BUTCH Herr  [facility]  10/18/21  16:08 EDT

## 2021-10-18 NOTE — CASE MANAGEMENT/SOCIAL WORK
Continued Stay Note  [facility]     Patient Name: Ned Basilio  MRN: [id number]  Today's Date: 10/18/2021    Admit Date: 10/10/2021     Discharge Plan     Row Name 10/18/21 1657       Plan    Plan SNF -- Referrals Pending.    Patient/Family in Agreement with Plan yes    Plan Comments Received a call back from Oksana/Elias who is unable to accept the patient at this time. Updated the patient's guardian/Chanell who's agreeable. She is also agreeable with broad SNF referrals. Referrals sent in [facility] ([facility], [facility], [facility], [facility] Spoke with Dayana/Jessenia, Charleen/Linh, Christophe/Jerome & Arcenoi/Klaus who will all look into the patient's case for a SNF Medicaid bed. Await their determinations.               Discharge Codes    No documentation.                     Sruthi Lopez RN

## 2021-10-18 NOTE — PLAN OF CARE
Goal Outcome Evaluation:           Progress: no change   Pt is a post op day 7 of a rt hip IM nailing. Dressing is clean dry and intact. Pt continues with the gauze and Tegaderm. Pt has a history of CP and Park[place]. Pt is non verbal, but moans at times. BUE/BLE are contracted. Pt tolerated meds crushed in pudding with increased encouragement. Pt continues with the specialty mattress an Q2 turns. Seizure precaution in place. Pt resting at this time, will continue to monitor.

## 2021-10-18 NOTE — CASE MANAGEMENT/SOCIAL WORK
Continued Stay Note  [facility]     Patient Name: Ned Basilio  MRN: [id number]  Today's Date: 10/18/2021    Admit Date: 10/10/2021     Discharge Plan     Row Name 10/18/21 1149       Plan    Plan SNF -- Referral Pending.    Patient/Family in Agreement with Plan yes    Plan Comments Spoke with Cb who said they do not have [facility] SNF beds available at [place] at this time. She is waiting on a response from [place] and will f/u with CCP.               Discharge Codes    No documentation.                     Sruthi Lopez RN

## 2021-10-18 NOTE — DISCHARGE PLACEMENT REQUEST
"Dillon Basilio (65 y.o. Male)             Date of Birth Social Security Number Address Home Phone MRN    1955  [address]   [place] [id number] [phone] [id number]    Anglican Marital Status             None Single       Admission Date Admission Type Admitting Provider Attending Provider Department, Room/Bed    10/10/21 Emergency Ray, MD Dylan Lance Samer H, MD [facility] [address], P877/1    Discharge Date Discharge Disposition Discharge Destination                         Attending Provider: Desean Richardson MD    Allergies: Lovastatin    Isolation: None   Infection: None   Code Status: No CPR   Advance Care Planning Activity    Ht: 152.4 cm (60\")   Wt: 90.5 kg (199 lb 8 oz)    Admission Cmt: None   Principal Problem: Closed displaced oblique fracture of shaft of right femur (HCC) [S72.331A]                 Active Insurance as of 10/10/2021     Primary Coverage     Payor Plan Insurance Group Employer/Plan Group    MEDICARE MEDICARE A & B      Payor Plan Address Payor Plan Phone Number Payor Plan Fax Number Effective Dates    [address] [phone]  11/1/1976 - None Entered    [facility] [id number]       Subscriber Name Subscriber Birth Date Member ID       DILLON BASILIO 1955 [id number]           Secondary Coverage     Payor Plan Insurance Group Employer/Plan Group    [place]      Payor Plan Address Payor Plan Phone Number Payor Plan Fax Number Effective Dates    [address] [phone]  3/4/2019 - None Entered    [place] [address]       Subscriber Name Subscriber Birth Date Member ID       DILLON BASILIO 1955 [id number]                 Emergency Contacts      (Rel.) Home Phone Work Phone Mobile Phone    Chanell Valencia (Guardian) [phone] -- [phone]    REJI GREGORIO () () [phone] -- --    Sweta Rdz (Guardian) [phone] -- --    EMANUAL,UNKNOWN (Care Giver) -- -- " [phone]    Jemal Castano (Other) -- [phone] --

## 2021-10-19 VITALS
RESPIRATION RATE: 18 BRPM | OXYGEN SATURATION: 97 % | TEMPERATURE: 96.9 F | SYSTOLIC BLOOD PRESSURE: 161 MMHG | HEART RATE: 71 BPM | HEIGHT: 60 IN | WEIGHT: 199.5 LBS | BODY MASS INDEX: 39.17 KG/M2 | DIASTOLIC BLOOD PRESSURE: 88 MMHG

## 2021-10-19 LAB
ANION GAP SERPL CALCULATED.3IONS-SCNC: 10.1 MMOL/L (ref 5–15)
BUN SERPL-MCNC: 5 MG/DL (ref 8–23)
BUN/CREAT SERPL: 11.4 (ref 7–25)
CALCIUM SPEC-SCNC: 8.3 MG/DL (ref 8.6–10.5)
CHLORIDE SERPL-SCNC: 101 MMOL/L (ref 98–107)
CO2 SERPL-SCNC: 28.9 MMOL/L (ref 22–29)
CREAT SERPL-MCNC: 0.44 MG/DL (ref 0.76–1.27)
DEPRECATED RDW RBC AUTO: 44.7 FL (ref 37–54)
ERYTHROCYTE [DISTWIDTH] IN BLOOD BY AUTOMATED COUNT: 13.1 % (ref 12.3–15.4)
GFR SERPL CREATININE-BSD FRML MDRD: >150 ML/MIN/1.73
GLUCOSE SERPL-MCNC: 75 MG/DL (ref 65–99)
HCT VFR BLD AUTO: 26 % (ref 37.5–51)
HGB BLD-MCNC: 8.7 G/DL (ref 13–17.7)
MCH RBC QN AUTO: 31.8 PG (ref 26.6–33)
MCHC RBC AUTO-ENTMCNC: 33.5 G/DL (ref 31.5–35.7)
MCV RBC AUTO: 94.9 FL (ref 79–97)
PLATELET # BLD AUTO: 243 10*3/MM3 (ref 140–450)
PMV BLD AUTO: 9.9 FL (ref 6–12)
POTASSIUM SERPL-SCNC: 3.5 MMOL/L (ref 3.5–5.2)
RBC # BLD AUTO: 2.74 10*6/MM3 (ref 4.14–5.8)
SODIUM SERPL-SCNC: 140 MMOL/L (ref 136–145)
WBC # BLD AUTO: 6.21 10*3/MM3 (ref 3.4–10.8)

## 2021-10-19 PROCEDURE — 25010000002 ENOXAPARIN PER 10 MG: Performed by: NURSE PRACTITIONER

## 2021-10-19 PROCEDURE — 80048 BASIC METABOLIC PNL TOTAL CA: CPT | Performed by: NURSE PRACTITIONER

## 2021-10-19 PROCEDURE — 94760 N-INVAS EAR/PLS OXIMETRY 1: CPT

## 2021-10-19 PROCEDURE — 85027 COMPLETE CBC AUTOMATED: CPT | Performed by: NURSE PRACTITIONER

## 2021-10-19 PROCEDURE — 94799 UNLISTED PULMONARY SVC/PX: CPT

## 2021-10-19 RX ORDER — ASPIRIN 81 MG/1
81 TABLET ORAL DAILY
Qty: 30 TABLET | Refills: 0 | Status: SHIPPED | OUTPATIENT
Start: 2021-10-20

## 2021-10-19 RX ORDER — LEVETIRACETAM 100 MG/ML
1000 SOLUTION ORAL EVERY 12 HOURS SCHEDULED
Status: DISCONTINUED | OUTPATIENT
Start: 2021-10-19 | End: 2021-10-20 | Stop reason: HOSPADM

## 2021-10-19 RX ORDER — ERGOCALCIFEROL 1.25 MG/1
50000 CAPSULE ORAL
Qty: 5 CAPSULE | Refills: 0 | Status: SHIPPED | OUTPATIENT
Start: 2021-10-19

## 2021-10-19 RX ADMIN — Medication 1000 UNITS: at 11:27

## 2021-10-19 RX ADMIN — LAMOTRIGINE 100 MG: 100 TABLET ORAL at 11:27

## 2021-10-19 RX ADMIN — MICONAZOLE NITRATE: 2 POWDER TOPICAL at 09:46

## 2021-10-19 RX ADMIN — TRAZODONE HYDROCHLORIDE 50 MG: 50 TABLET ORAL at 20:06

## 2021-10-19 RX ADMIN — BACLOFEN 10 MG: 10 TABLET ORAL at 11:27

## 2021-10-19 RX ADMIN — BACLOFEN 10 MG: 10 TABLET ORAL at 20:07

## 2021-10-19 RX ADMIN — IPRATROPIUM BROMIDE AND ALBUTEROL SULFATE 3 ML: 2.5; .5 SOLUTION RESPIRATORY (INHALATION) at 15:35

## 2021-10-19 RX ADMIN — ENOXAPARIN SODIUM 30 MG: 30 INJECTION SUBCUTANEOUS at 09:49

## 2021-10-19 RX ADMIN — ASPIRIN 81 MG: 81 TABLET, COATED ORAL at 09:45

## 2021-10-19 RX ADMIN — POLYETHYLENE GLYCOL 3350 17 G: 17 POWDER, FOR SOLUTION ORAL at 09:49

## 2021-10-19 RX ADMIN — LEVOTHYROXINE SODIUM 125 MCG: 0.12 TABLET ORAL at 11:27

## 2021-10-19 RX ADMIN — LEVETIRACETAM 1000 MG: 100 SOLUTION ORAL at 20:01

## 2021-10-19 RX ADMIN — Medication 1000 UNITS: at 20:06

## 2021-10-19 RX ADMIN — MICONAZOLE NITRATE: 2 POWDER TOPICAL at 19:56

## 2021-10-19 RX ADMIN — LAMOTRIGINE 100 MG: 100 TABLET ORAL at 20:07

## 2021-10-19 RX ADMIN — SODIUM CHLORIDE, POTASSIUM CHLORIDE, SODIUM LACTATE AND CALCIUM CHLORIDE 75 ML/HR: 600; 310; 30; 20 INJECTION, SOLUTION INTRAVENOUS at 09:22

## 2021-10-19 RX ADMIN — ISOSORBIDE DINITRATE 20 MG: 20 TABLET ORAL at 02:47

## 2021-10-19 RX ADMIN — PANTOPRAZOLE SODIUM 40 MG: 40 TABLET, DELAYED RELEASE ORAL at 06:52

## 2021-10-19 RX ADMIN — DANTROLENE SODIUM 25 MG: 25 CAPSULE ORAL at 09:45

## 2021-10-19 RX ADMIN — METOPROLOL TARTRATE 25 MG: 25 TABLET, FILM COATED ORAL at 19:55

## 2021-10-19 RX ADMIN — LEVETIRACETAM 1000 MG: 100 SOLUTION ORAL at 15:54

## 2021-10-19 RX ADMIN — PAROXETINE HYDROCHLORIDE 30 MG: 30 TABLET, FILM COATED ORAL at 09:45

## 2021-10-19 RX ADMIN — DOCUSATE SODIUM 100 MG: 100 CAPSULE, LIQUID FILLED ORAL at 09:50

## 2021-10-19 RX ADMIN — IPRATROPIUM BROMIDE AND ALBUTEROL SULFATE 3 ML: 2.5; .5 SOLUTION RESPIRATORY (INHALATION) at 08:18

## 2021-10-19 RX ADMIN — METOPROLOL TARTRATE 25 MG: 25 TABLET, FILM COATED ORAL at 09:45

## 2021-10-19 RX ADMIN — HYDROCODONE BITARTRATE AND ACETAMINOPHEN 1 TABLET: 7.5; 325 TABLET ORAL at 11:40

## 2021-10-19 RX ADMIN — ISOSORBIDE DINITRATE 20 MG: 20 TABLET ORAL at 19:54

## 2021-10-19 RX ADMIN — FLUTICASONE PROPIONATE 1 SPRAY: 50 SPRAY, METERED NASAL at 09:46

## 2021-10-19 RX ADMIN — MONTELUKAST SODIUM 10 MG: 10 TABLET, FILM COATED ORAL at 20:07

## 2021-10-19 RX ADMIN — HYDROCODONE BITARTRATE AND ACETAMINOPHEN 1 TABLET: 7.5; 325 TABLET ORAL at 19:54

## 2021-10-19 NOTE — DISCHARGE SUMMARY
Patient Name: Ned Basilio  : 1955  MRN: [id number]    Date of Admission: 10/10/2021  Date of Discharge:  10/19/2021  Primary Care Physician: Seth Villanueva MD      Chief Complaint:   Fall      Discharge Diagnoses     Active Hospital Problems    Diagnosis  POA   • **Closed displaced oblique fracture of shaft of right femur (HCC) [S72.331A]  Yes   • ABLA (acute blood loss anemia) [D62]  Unknown   • Sommers's esophagus [K22.70]  Unknown   • Chronic constipation [K59.09]  Yes   • Seizure disorder (HCC) [G40.909]  Yes   • Cerebral palsy (HCC) [G80.9]  Yes   • Parkinson disease (HCC) [G20]  Yes   • Osteoporosis [M81.0]  Yes   • Chronic respiratory failure with hypoxia (HCC) [J96.11]  Yes   • Hypothyroidism [E03.9]  Yes   • Coronary artery disease [I25.10]  Yes      Resolved Hospital Problems    Diagnosis Date Resolved POA   • Leukocytosis [D72.829] 10/12/2021 Yes        Hospital Course     Mr. Basilio is a 65 y.o. male with a history of Raynaud's disease, seizures, Parkinson's disease, hyperlipidemia, cerebral palsy--chronic immobility syndrome and nonverbal at baseline (with reported guardian in the state of [place]), hemiplegia, Sommers's esophagus, obesity, constipation, hypothyroidism, hyperlipidemia, CAD who presented to [facility] initially complaining of fall.  Please see the admitting history and physical for further details.  He was found to have closed displaced oblique fracture of shaft of right femur and was admitted to the hospital for further evaluation and treatment.      Nursing home resident sent to [facility] ER for reported attempted transfer with Oksana Lift & subsequent fall with closed displaced oblique fracture of shaft of right femur.  Patient underwent intramedullary fixation of right proximal femoral shaft fracture on 10/12 per orthopedic surgery.  Recommendations include enoxaparin 21 days total for VTE prophylaxis and follow-up orthopedic surgery in 1  month.    Received 2 units packed RBC for acute blood loss anemia following surgery.  Serum hemoglobin stable prior to discharge.    Patient requiring skilled nursing facility for rehab accepted on 10/19/2021 and appears medically stable for discharge on the same date.    Day of Discharge       Physical Exam:  Temp:  [96.8 °F (36 °C)-97.5 °F (36.4 °C)] 96.9 °F (36.1 °C)  Heart Rate:  [65-81] 69  Resp:  [16-20] 18  BP: (136-176)/(71-86) 137/86  Body mass index is 38.96 kg/m².     Physical Exam   Constitutional:       General: He is not in acute distress.     Appearance: He is ill-appearing (Chronic). He is not toxic-appearing.      Comments: Morbidly obese, generally weak, toyin, bedbound   HENT:      Head: Normocephalic and atraumatic.   Eyes:      Conjunctiva/sclera: Conjunctivae normal.   Cardiovascular:      Rate and Rhythm: Normal rate.      Heart sounds: Normal heart sounds.   Pulmonary:      Effort: Pulmonary effort is normal.      Comments: Diminished on expiration throughout lung fields  Abdominal:      General: Negative distension.      Palpations: Abdomen is soft.      Tenderness: Negative abdominal tenderness. There is no guarding.   Musculoskeletal:         General: Tenderness (Proximal previous fracture with surgical repair) present.      Cervical back: Normal range of motion and neck supple.      Right lower leg: Edema (Dependent, nonpitting edema BLE) present.      Left lower leg: Edema present.   Skin:     General: Skin is warm and dry.      Coloration: Skin is pale.   Neurological:      Mental Status: He is alert.      Comments: Unable to ascertain secondary to nonverbal (baseline).  Psychiatric:      Comments: Unable to ascertain secondary to nonverbal; does not pose risk for elopement        Consultants     Consult Orders (all) (From admission, onward)     Start     Ordered    10/12/21 0000  Inpatient Case Management  Consult  Once        Provider:  (Not yet assigned)     10/11/21 0036    10/11/21 2314  Inpatient Case Management  Consult  Once        Provider:  (Not yet assigned)    10/11/21 2313    10/11/21 0037  Inpatient Orthopedic Surgery Consult  Once        Specialty:  Orthopedic Surgery  Provider:  Manuelito Oquendo MD    10/11/21 0036    10/11/21 0035  Inpatient Pulmonology Consult  Once        Specialty:  Pulmonary Disease  Provider:  Jorge Riggins MD    10/11/21 0036    10/11/21 0031  Inpatient Orthopedic Surgery Consult  Once,   Status:  Canceled        Specialty:  Orthopedic Surgery  Provider:  Manuelito Oquendo MD    10/11/21 0036    10/10/21 2322  [facility] (on-call MD unless specified) Details  Once        Specialty:  Hospitalist  Provider:  (Not yet assigned)    10/10/21 2321    10/10/21 2322  Ortho (on-call MD unless specified)  Once        Specialty:  Orthopedic Surgery  Provider:  (Not yet assigned)    10/10/21 2321              Procedures     Right Femur Intramedullary Rodding      Imaging Results (All)     Procedure Component Value Units Date/Time    XR Abdomen KUB [[id number]] Collected: 10/18/21 1826     Updated: 10/18/21 1830    Narrative:      ABDOMEN KUB     CLINICAL HISTORY: Abdominal pain. Constipation.     4 supine views of the abdomen were obtained.     Compared to the CT scan of the abdomen and pelvis dated 10/10/2021     The bowel gas pattern is within normal limits. There is no evidence of  obstruction. Only a small amount of formed stool is identified within  the colon. There is marked thoracolumbar scoliosis. No pathologic  intra-abdominal calcifications are identified. Internal fixation of an  old healed fracture of the right femoral neck is noted.     This report was finalized on 10/18/2021 6:27 PM by Dr. Ned Shaikh M.D.       XR Hip With or Without Pelvis 2 - 3 View Right [[id number]] Collected: 10/11/21 2047     Updated: 10/11/21 2051    Narrative:      TWO-VIEW PORTABLE RIGHT HIP IN OR     HISTORY: Internal fixation of fracture      Imaging in the operating room was performed at the time of internal  fixation of a fracture of the proximal femoral shaft with long  intramedullary beatrice and intersecting screws and the alignment appears  satisfactory. 6 images were obtained and the fluoroscopy time measures 6  minutes and 30 seconds.     This report was finalized on 10/11/2021 8:48 PM by Dr. Bryce Mitchell M.D.       FL C Arm During Surgery [[id number]] Resulted: 10/11/21 2042     Updated: 10/11/21 2042    Narrative:      This procedure was auto-finalized with no dictation required.    CT Abdomen Pelvis Without Contrast [[id number]] Collected: 10/10/21 2325     Updated: 10/10/21 2325    Narrative:        Patient: DILLON GUZMAN  Time Out: 23:25  Exam(s): CT ABDOMEN + PELVIS Without Contrast     EXAM:    CT Abdomen and Pelvis Without Intravenous Contrast    CLINICAL HISTORY:     Reason for exam: Fall from Oksana lift onto right hip and leg.    TECHNIQUE:    Axial computed tomography images of the abdomen and pelvis without   intravenous contrast.  CTDI is 27.9 mGy and DLP is 1633.5 mGy-cm.  This   CT exam was performed according to the principle of ALARA (As Low As   Reasonably Achievable) by using one or more of the following dose   reduction techniques: automated exposure control, adjustment of the mA   and or kV according to patient size, and or use of iterative   reconstruction technique.    COMPARISON:    09 28 2021.    FINDINGS:    Lung bases:  Bilateral lower lobe atelectasis infiltrates.     ABDOMEN:    Liver:  Unremarkable.    Gallbladder and bile ducts:  Unremarkable.  No calcified stones.    Pancreas:  Unremarkable.    Spleen:  Unremarkable.    Adrenals:  Unremarkable.    Kidneys and ureters:  No hydronephrosis or ureteral calculus.  Small   right nephrolithiasis.    Stomach and bowel:  Unremarkable.  No obstruction.     PELVIS:    Appendix:  No findings to suggest acute appendicitis.    Bladder:  Mildly thickened bladder which may be  underdistention,   chronic, versus cystitis.    Reproductive:  Unremarkable as visualized.     ABDOMEN and PELVIS:    Intraperitoneal space:  No free air.    Bones joints:  Right proximal femoral diaphysis displaced angulated   fracture.  Scoliosis.    Soft tissues:  Unremarkable.    Vasculature:  Unremarkable.    Lymph nodes:  Unremarkable.    IMPRESSION:       1.  Right proximal femoral diaphysis displaced angulated fracture.  2.  Bilateral lower lobe atelectasis infiltrates.      Impression:          Electronically signed by Dawson Ann M.D. on 10-10-21 at 2325    XR Chest 1 View [[id number]] Collected: 10/10/21 2304     Updated: 10/10/21 2304    Narrative:        Patient: DILLON GUZMAN  Time Out: 23:04  Exam(s): FILM CXR 1 VIEW     EXAM:    XR Chest, 1 View    CLINICAL HISTORY:     Reason for exam: Fall from Oksana lift to right leg.    TECHNIQUE:    Frontal view of the chest.    COMPARISON:    03 04 2019.    FINDINGS:    Lungs:  Possible mild pulmonary vascular congestion.  No focal   consolidation.    Pleural space:  Unremarkable.  No pneumothorax.    Heart:  Cardiomegaly.    Mediastinum:  Widened mediastinum which may be projectional versus   dissection or other etiology.    Bones joints:  Unremarkable.    IMPRESSION:       1.  Possible mild pulmonary vascular congestion.  No focal consolidation.  2.  Widened mediastinum which may be projectional versus dissection or   other etiology.      Impression:          Electronically signed by Dawson Ann M.D. on 10-10-21 at 2304    XR Femur 2 View Right [[id number]] Collected: 10/10/21 2301     Updated: 10/10/21 2301    Narrative:        Patient: DILLON GUZMAN  Time Out: 23:00  Exam(s): FILM RIGHT FEMUR     EXAM:    XR Right Femur, 2 Views    CLINICAL HISTORY:     Reason for exam: Fall from Oksana lift to right leg.    TECHNIQUE:    Frontal and lateral views of the right femur.    COMPARISON:    No relevant prior studies available.    FINDINGS:    Bones joints:  Proximal  femoral diaphysis displaced angulated fracture.    No acute dislocation.    Soft tissues:  Unremarkable.    IMPRESSION:         Proximal femoral diaphysis displaced angulated fracture.      Impression:          Electronically signed by Dawson Ann M.D. on 10-10-21 at 2300    CT Head Without Contrast [[id number]] Collected: 10/10/21 2235     Updated: 10/10/21 2235    Narrative:        Patient: DILLON GUZMAN  Time Out: 22:34  Exam(s): CT HEAD Without Contrast     EXAM:    CT Head Without Intravenous Contrast    CLINICAL HISTORY:     Reason for exam: Fall from wire left onto right leg.    TECHNIQUE:    Axial computed tomography images of the head brain without intravenous   contrast.  CTDI is 54.8 mGy and DLP is 1138.8 mGy-cm.  This CT exam was   performed according to the principle of ALARA (As Low As Reasonably   Achievable) by using one or more of the following dose reduction   techniques: automated exposure control, adjustment of the mA and or kV   according to patient size, and or use of iterative reconstruction   technique.    COMPARISON:    No relevant prior studies available.    FINDINGS:    Limitations:  Limited due to motion.    Brain:  No acute intracranial hemorrhage or cortical ischemia.  Chronic   small vessel ischemic changes.    Ventricles:  Unremarkable.    Bones joints:  Unremarkable.  No acute fracture.    Soft tissues:  Unremarkable.    Sinuses:  Paranasal sinus mucosal thickening.    Mastoid air cells:  Unremarkable as visualized.    IMPRESSION:         No acute intracranial hemorrhage or skull fracture.      Impression:          Electronically signed by Dawson Ann M.D. on 10-10-21 at 2234            Pertinent Labs     Results from last 7 days   Lab Units 10/19/21  0602 10/18/21  0614 10/16/21  2258 10/16/21  0502   WBC 10*3/mm3 6.21 8.31 6.14 7.13   HEMOGLOBIN g/dL 8.7* 8.6* 8.2* 10.0*   PLATELETS 10*3/mm3 243 249 195 181     Results from last 7 days   Lab Units 10/19/21  0602 10/16/21  2258  10/15/21  0522 10/14/21  0504   SODIUM mmol/L 140 142 141 143   POTASSIUM mmol/L 3.5 3.6 3.7 3.8   CHLORIDE mmol/L 101 104 105 108*   CO2 mmol/L 28.9 28.3 26.4 28.8   BUN mg/dL 5* 7* 6* 6*   CREATININE mg/dL 0.44* 0.50* 0.46* 0.47*   GLUCOSE mg/dL 75 97 75 92   Estimated Creatinine Clearance: 158.8 mL/min (A) (by C-G formula based on SCr of 0.44 mg/dL (L)).  Results from last 7 days   Lab Units 10/16/21  2258   ALBUMIN g/dL 3.10*   BILIRUBIN mg/dL 0.5   ALK PHOS U/L 62   AST (SGOT) U/L 20   ALT (SGPT) U/L 16     Results from last 7 days   Lab Units 10/19/21  0602 10/16/21  2258 10/15/21  0522 10/14/21  0504   CALCIUM mg/dL 8.3* 8.5* 8.2* 8.0*   ALBUMIN g/dL  --  3.10*  --   --                Invalid input(s): LDLCALC          Test Results Pending at Discharge       Discharge Details        Discharge Medications      New Medications      Instructions Start Date   aspirin 81 MG EC tablet  Replaces: aspirin 81 MG chewable tablet   81 mg, Oral, Daily   Start Date: October 20, 2021     enoxaparin 30 MG/0.3ML solution syringe  Commonly known as: LOVENOX   30 mg, Subcutaneous, Every 24 Hours      HYDROcodone-acetaminophen 7.5-325 MG per tablet  Commonly known as: NORCO   Take 1-2 tabs po q 4 hours prn pain      vitamin D 1.25 MG (09855 UT) capsule capsule  Commonly known as: ERGOCALCIFEROL   50,000 Units, Oral, Every 7 Days         Changes to Medications      Instructions Start Date   polyethylene glycol 17 g packet  Commonly known as: MIRALAX  What changed:   · how much to take  · how to take this  · when to take this   17 g, Oral, Daily         Continue These Medications      Instructions Start Date   albuterol (2.5 MG/3ML) 0.083% nebulizer solution  Commonly known as: PROVENTIL   2.5 mg, Nebulization, Every 4 Hours PRN      baclofen 10 MG tablet  Commonly known as: LIORESAL   10 mg, Oral, 2 Times Daily      CALCIUM CITRATE + D PO   1 tablet, Oral, Daily      chlorhexidine 0.12 % solution  Commonly known as: PERIDEX   No  dose, route, or frequency recorded.      dantrolene 25 MG capsule  Commonly known as: DANTRIUM   25 mg, Oral, Daily      Desitin 13 % cream cream  Generic drug: zinc oxide   No dose, route, or frequency recorded.      ezetimibe 10 MG tablet  Commonly known as: ZETIA   10 mg, Oral, Daily      fluticasone 50 MCG/ACT nasal spray  Commonly known as: FLONASE   No dose, route, or frequency recorded.      isosorbide dinitrate 20 MG tablet  Commonly known as: ISORDIL   20 mg, Oral, Every 12 Hours      lamoTRIgine 100 MG tablet  Commonly known as: LaMICtal   100 mg, Oral, 2 Times Daily      Laxative 10 MG suppository  Generic drug: bisacodyl   10 mg, Rectal, Daily      levETIRAcetam 1000 MG tablet  Commonly known as: KEPPRA   1,000 mg, Oral, 2 Times Daily      levothyroxine 125 MCG tablet  Commonly known as: SYNTHROID, LEVOTHROID   125 mcg, Oral, Daily      Linzess 145 MCG capsule capsule  Generic drug: linaclotide   145 mcg, Every Morning Before Breakfast      metoprolol tartrate 25 MG tablet  Commonly known as: LOPRESSOR   25 mg, Oral, 2 Times Daily      Mi-Acid Gas Relief 80 MG chewable tablet  Generic drug: simethicone   CHEW 2 TABLETS 2 (TWO) TIMES A DAY FOR 30 DAYS.      miconazole 2 % powder  Commonly known as: MICOTIN   Topical, 2 Times Daily, Apply powder to intertriginous rash two times daily       montelukast 10 MG tablet  Commonly known as: SINGULAIR   10 mg, Oral, Nightly      multivitamin with minerals tablet tablet   No dose, route, or frequency recorded.      Nasonex 50 MCG/ACT nasal spray  Generic drug: mometasone   2 sprays, Daily      omeprazole 40 MG capsule  Commonly known as: priLOSEC   40 mg, Oral, Every 12 Hours      PARoxetine 30 MG tablet  Commonly known as: PAXIL   No dose, route, or frequency recorded.      Plenvu 140 g reconstituted solution solution  Generic drug: PEG-KCl-NaCl-NaSulf-Na Asc-C   TAKE 19,600 G BY MOUTH TAKE AS DIRECTED. PLEASE FOLLOW PREP INSTRUCTIONS.      senna 8.6 MG  tablet  Commonly known as: SENOKOT   3 tablets, Oral, 2 Times Daily      TAB-A-KATE/IRON PO   1 tablet, Oral, Daily      traZODone 100 MG tablet  Commonly known as: DESYREL   No dose, route, or frequency recorded.      Trulance 3 MG tablet  Generic drug: Plecanatide   3 mg, Oral, Daily      Vitamin D-3 25 MCG (1000 UT) capsule   1,000 Units, Oral, 2 Times Daily         Stop These Medications    aspirin 81 MG chewable tablet  Replaced by: aspirin 81 MG EC tablet            Allergies   Allergen Reactions   • Lovastatin Anaphylaxis     Unknown reaction; Facility did not provide information.        Discharge Disposition:  Skilled Nursing Facility (DC - External)      Discharge Diet:  Diet Order   Procedures   • Diet Pureed       Discharge Activity:   Activity Instructions     Discharge Activity      1.  Ice to right hip and thigh prn  2.  Reposition patient q 2 hours to prevent skin breakdown.  3.  May be up in chair BID using Oksana lift.    Other Activity Instructions      Activity Instructions: Previously bed bound requiring Oksana Lift for transfer.          CODE STATUS:    Code Status and Medical Interventions:   Ordered at: 10/12/21 1127     Limited Support to NOT Include:    Artificial Nutrition    Cardioversion/Defibrillation    Intubation     Code Status:    No CPR     Medical Interventions (Level of Support Prior to Arrest):    Limited       No future appointments.  Additional Instructions for the Follow-ups that You Need to Schedule     Discharge Follow-up with PCP   As directed       Currently Documented PCP:    Seth Villanueva MD    PCP Phone Number:    [phone]     Follow Up Details: Please call to schedule one week or earliest available follow-up appointment PCP; FELISA         Discharge Follow-up with Specialty: orthopedics; 1 Month   As directed      Specialty: orthopedics    Follow Up: 1 Month    Follow Up Details: Return to the office to see Dr. Josse Carrillo         Dressing Change  Instructions   As directed      Dressing change: Leave dressing intact until POD #5 unless saturated.   Please D/C staples on POD #14 and apply steri strips if incision without erythema or drainage.  If erythema or drainage, please notify office prior to staple removal.  Cover incisions with sterile dry dressing daily.    Order Comments: Dressing change: Leave dressing intact until POD #5 unless saturated.   Please D/C staples on POD #14 and apply steri strips if incision without erythema or drainage.  If erythema or drainage, please notify office prior to staple removal.  Cover incisions with sterile dry dressing daily.             Follow-up Information     Seth Villanueva MD .    Specialty: Internal Medicine  Why: Please call to schedule one week or earliest available follow-up appointment PCP; [facility]  Contact information:  [address] [place], [place] [id number]  [phone]                         Additional Instructions for the Follow-ups that You Need to Schedule     Discharge Follow-up with PCP   As directed       Currently Documented PCP:    Seth Villanueva MD    PCP Phone Number:    [phone]     Follow Up Details: Please call to schedule one week or earliest available follow-up appointment PCP; [facility]         Discharge Follow-up with Specialty: orthopedics; 1 Month   As directed      Specialty: orthopedics    Follow Up: 1 Month    Follow Up Details: Return to the office to see Dr. Josse Carrillo         Dressing Change Instructions   As directed      Dressing change: Leave dressing intact until POD #5 unless saturated.   Please D/C staples on POD #14 and apply steri strips if incision without erythema or drainage.  If erythema or drainage, please notify office prior to staple removal.  Cover incisions with sterile dry dressing daily.    Order Comments: Dressing change: Leave dressing intact until POD #5 unless saturated.   Please D/C staples on POD #14 and apply steri strips if incision  without erythema or drainage.  If erythema or drainage, please notify office prior to staple removal.  Cover incisions with sterile dry dressing daily.            Time Spent on Discharge:  Greater than 30 minutes      BUTCH Herr  [facility]  10/19/21  11:41 EDT

## 2021-10-19 NOTE — PLAN OF CARE
Goal Outcome Evaluation:  Plan of Care Reviewed With: patient        Progress: improving  Outcome Summary: . 66 yo male POD 8 R Hip IM nailing. Dsg CDI. Pt nonverbal, but moans at times. BLE,BUE are contracted. Pt tolerated meds in pudding/apple sauce crushed with encouragement. Pt on specialty mattress with Q 2 turns. Seizure precautions in place. Pain controlled with prn po meds. Voiding per male purewick/ breif. BM on shift. Pt currently on 2L O2 nasal cannula. Will continue to monitor.

## 2021-10-19 NOTE — CASE MANAGEMENT/SOCIAL WORK
"Physicians Statement of Medical Necessity for  Ambulance Transportation    GENERAL INFORMATION     Name: Ned Basilio  YOB: 1955  Medicare #: [id number] (See Facesheet)  Transport Date: 10/19/2021 (Valid for round trips this date, or for scheduled repetitive trips for 60 days from the date signed below.)  Origin: [facility]  Destination: [facility].  Is the Patient's stay covered under Medicare Part A (PPS/DRG?)Yes  Closest appropriate facility? Yes  If this a hosp-hosp transfer? No  Is this a hospice patient? No    MEDICAL NECESSITY QUESTIONAIRE    Ambulance Transportation is medically necessary only if other means of transportation are contraindicated or would be potentially harmful to the patient.  To meet this requirement, the patient must be either \"bed confined\" or suffer from a condition such that transport by means other than an ambulance is contraindicated by the patient's condition.  The following questions must be answered by the healthcare professional signing below for this form to be valid:     1) Describe the MEDICAL CONDITION (physical and/or mental) of this patient AT THE TIME OF AMBULANCE TRANSPORT that requires the patient to be transported in an ambulance, and why transport by other means is contraindicated by the patient's condition:   Past Medical History:   Diagnosis Date   • Anxiety    • Arthritis    • Sommers's esophagus    • Cardiomegaly    • Cerebral palsy (HCC)    • Constipation    • Disease of thyroid gland    • GERD (gastroesophageal reflux disease)    • Hyperlipidemia    • Osteoporosis    • Parkinson disease (HCC)    • Pre-operative cardiovascular examination    • Raynaud's disease    • Right hemiplegia (HCC)    • Seizures (HCC)       Past Surgical History:   Procedure Laterality Date   • FEMUR IM NAILING/RODDING Right 10/11/2021    Procedure: Right Femur Intramedullary Rodding;  Surgeon: Josse Carrillo MD;  Location: [facility] OR;  Service: " "Orthopedics;  Laterality: Right;      2) Is this patient \"bed confined\" as defined below?Yes   To be \"bed confined\" the patient must satisfy all three of the following criteria:  (1) unable to get up from bed without assistance; AND (2) unable to ambulate;  AND (3) unable to sit in a chair or wheelchair.  3) Can this patient safely be transported by car or wheelchair van (I.e., may safely sit during transport, without an attendant or monitoring?)No   4. In addition to completing questions 1-3 above, please check any of the following conditions that apply*:          *Note: supporting documentation for any boxes checked must be maintained in the patient's medical records Contractures, Requires oxygen - unable to self administer and Unable to tolerate seated position for time needed to transport      SIGNATURE OF PHYSICIAN OR OTHER AUTHORIZED HEALTHCARE PROFESSIONAL    I certify that the above information is true and correct based on my evaluation of this patient, and represent that the patient requires transport by ambulance and that other forms of transport are contraindicated.  I understand that this information will be used by the Centers for Medicare and Medicaid Services (CMS) to support the determiniation of medical necessity for ambulance services, and I represent that I have personal knowledge of the patient's condition at the time of transport.       If this box is checked, I also certify that the patient is physically or mentally incapable of signing the ambulance service's claim form and that the institution with which I am affiliated has furnished care, services or assistance to the patient.  My signature below is made on behalf of the patient pursuant to 42 .36(b)(4). In accordance with 42 .37, the specific reason(s) that the patient is physically or mentally incapable of signing the claim for is as follows:     Signature of Physician or Healthcare Professional  Date/Time:        (For " Scheduled repetitive transport, this form is not valid for transports performed more than 60 days after this date).                                                                                                                                            --------------------------------------------------------------------------------------------  Printed Name and Credentials of Physician or Authorized Healthcare Professional     *Form must be signed by patient's attending physician for scheduled, repetitive transports,.  For non-repetitive ambulance transports, if unable to obtain the signature of the attending physician, any of the following may sign (please select below):     Physician  Clinical Nurse Specialist  Registered Nurse     Physician Assistant  Discharge Planner  Licensed Practical Nurse     Nurse Practitioner

## 2021-10-20 NOTE — PLAN OF CARE
Goal Outcome Evaluation:  Plan of Care Reviewed With: patient        Progress: no change  Outcome Summary: vss, rt hip dsg c/d/i, neurovascular status wnl, voiding incontinently, on oxygen at 2l, transferred to [facility] via ambulance in stable condition

## 2021-10-20 NOTE — PLAN OF CARE
Goal Outcome Evaluation:  Plan of Care Reviewed With: patient        Progress: no change  Outcome Summary: Stable througout shift. VSS and voiding incontinently. x1 BM this shift. Meds crushed in puree. Q2 turns to maintain skin integrity. Patient ready for transport to SNF tonight via ambulance.

## 2021-10-20 NOTE — CASE MANAGEMENT/SOCIAL WORK
Case Management Discharge Note      Final Note: [facility] SNF.    Provided Post Acute Provider List?: Yes  Post Acute Provider List: Nursing Home (SNF.)  Provided Post Acute Provider Quality & Resource List?: Yes  Post Acute Provider Quality and Resource List: Nursing Home (SNF.)  Delivered To: Support Person  Support Person: Deedeean/Chanell Valencia.  Method of Delivery: Telephone    Selected Continued Care - Discharged on 10/19/2021 Admission date: 10/10/2021 - Discharge disposition: Skilled Nursing Facility (DC - External)    Destination Coordination complete.    Service Provider Selected Services Address Phone Fax Patient Preferred    [facility] Nursing [address] [address] [phone] --          Durable Medical Equipment    No services have been selected for the patient.              Dialysis/Infusion    No services have been selected for the patient.              Home Medical Care    No services have been selected for the patient.              Therapy    No services have been selected for the patient.              Community Resources    No services have been selected for the patient.              Community & DME    No services have been selected for the patient.                       Final Discharge Disposition Code: 03 - skilled nursing facility (SNF)

## 2021-11-17 ENCOUNTER — OFFICE VISIT (OUTPATIENT)
Dept: ORTHOPEDIC SURGERY | Facility: CLINIC | Age: 66
End: 2021-11-17

## 2021-11-17 VITALS — TEMPERATURE: 96.9 F

## 2021-11-17 DIAGNOSIS — S72.331A CLOSED DISPLACED OBLIQUE FRACTURE OF SHAFT OF RIGHT FEMUR, INITIAL ENCOUNTER (HCC): Primary | ICD-10-CM

## 2021-11-17 PROCEDURE — 99024 POSTOP FOLLOW-UP VISIT: CPT | Performed by: ORTHOPAEDIC SURGERY

## 2021-11-17 PROCEDURE — 73552 X-RAY EXAM OF FEMUR 2/>: CPT | Performed by: ORTHOPAEDIC SURGERY

## 2021-11-17 NOTE — PROGRESS NOTES
Ned Basilio     : 1955     MRN: [id number]     DATE: 2021    CC:  6 weeks status post cephalo-medullary fixation of femur fracture    HPI: Patient is approximately 6 weeks out from surgery.  He is here with caregiver and is nonverbal at baseline.  Has cerebral palsy and is nonambulatory at baseline as well.  Caregiver states he has been doing fine and they have been able to move him with a Oksana lift as needed.  There is noted about Lovenox discontinuation however do not see it in his active medicines from his rehab but I will dress this and the plan.  No other concerns or complaints noted    Vitals:    21 0900   Temp: 96.9 °F (36.1 °C)       Physical exam:  Incisions are healed.  No erythema or drainage.  Gentle hip range of motion did not seem to cause the patient discomfort.  Motor and sensory at baseline good pedal pulses with brisk cap refill.      Imaging  4 views of the right hip and knee are ordered and reviewed for comparison purposes and to evaluate healing.  X-rays show alignment unchanged.  Evidence of callus formation.    Impression:  6 weeks s/p cephalo-medullary fixation of right  femur fracture    Plan:    1.  Continue PT as needed.  Patient is nonambulatory at baseline.  2.  OK to discontinue DVT prophylaxis per orthopedics.  3. Continue home vitamin D and calcium.  4.  Follow up in 6-8 weeks with repeat x-rays of Right hip and knee.    Josse Carrillo MD    2021

## 2021-11-17 NOTE — PATIENT INSTRUCTIONS
Impression:  6 weeks s/p cephalo-medullary fixation of right  femur fracture    Plan:    1.  Continue PT as needed with gentle motion.  Patient is nonambulatory at baseline.  2.  OK to discontinue DVT prophylaxis per orthopedics.  3. Continue home vitamin D and calcium.  4.  Follow up in 6-8 weeks with repeat x-rays of Right hip and knee.    Please call any questions or concerns thank you

## 2022-02-01 NOTE — PROGRESS NOTES
"Chief Complaint   Patient presents with   • Heartburn   • Constipation         History of Present Illness  Patient today for a follow-up.  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  He also has history of Sommers's esophagus.  He is currently due for surveillance of Sommers's esophagus. He does not have any difficulty eating. He has not been complaining of any abdominal pain per his caregiver today, who has accompanied him on his visit.     He does continue to experience constipation.        Result Review :       ENDOSCOPY, INT (01/24/2019)  SCANNED - COLONOSCOPY (01/24/2019)  XR Abdomen KUB (10/18/2021 16:56)  Progress Notes by Mary Montano APRN (08/23/2021 10:30)      Vital Signs:   /82   Pulse 68   Temp 97.7 °F (36.5 °C)   Ht 152.4 cm (60\")   SpO2 100%   BMI 38.96 kg/m²     Body mass index is 38.96 kg/m².     Physical Exam  Vitals reviewed.   Constitutional:       Appearance: Normal appearance.   Abdominal:      General: Bowel sounds are normal. There is no distension.      Palpations: Abdomen is soft. Abdomen is not rigid. There is no mass or pulsatile mass.      Tenderness: There is no abdominal tenderness. There is no guarding or rebound.           Assessment and Plan    Diagnoses and all orders for this visit:    1. Sommers's esophagus without dysplasia (Primary)    2. Chronic constipation    3. Bloating    4. History of colon polyps    5. Gastroesophageal reflux disease without esophagitis       Documentation by Bianca RAE acting as a scribe in the following sections (HPI, Plan) for the undersigned provider.     BRIEF SUMMARY  Patient today for a follow-up.  He has history of GERD as well as Sommers's esophagus and is due for EGD for surveillance.  Also has chronic constipation with prior colonoscopy showing melanosis coli.    I have reviewed and confirmed the accuracy of the HPI and Assessment and Plan as documented by the " BUTCH Valdez        Follow Up   No follow-ups on file.    Patient Instructions   1. Due to your history of Sommers's esophagus, we will schedule an EGD.    2. Continue omeprazole.

## 2022-02-02 ENCOUNTER — PREP FOR SURGERY (OUTPATIENT)
Dept: OTHER | Facility: HOSPITAL | Age: 67
End: 2022-02-02

## 2022-02-02 ENCOUNTER — OFFICE VISIT (OUTPATIENT)
Dept: GASTROENTEROLOGY | Facility: CLINIC | Age: 67
End: 2022-02-02

## 2022-02-02 VITALS
OXYGEN SATURATION: 100 % | HEIGHT: 60 IN | BODY MASS INDEX: 38.96 KG/M2 | HEART RATE: 68 BPM | DIASTOLIC BLOOD PRESSURE: 82 MMHG | TEMPERATURE: 97.7 F | SYSTOLIC BLOOD PRESSURE: 120 MMHG

## 2022-02-02 DIAGNOSIS — K22.70 BARRETT'S ESOPHAGUS WITHOUT DYSPLASIA: Primary | ICD-10-CM

## 2022-02-02 DIAGNOSIS — K59.09 CHRONIC CONSTIPATION: ICD-10-CM

## 2022-02-02 DIAGNOSIS — Z86.010 HISTORY OF COLON POLYPS: ICD-10-CM

## 2022-02-02 DIAGNOSIS — R14.0 BLOATING: ICD-10-CM

## 2022-02-02 DIAGNOSIS — K21.9 GASTROESOPHAGEAL REFLUX DISEASE WITHOUT ESOPHAGITIS: ICD-10-CM

## 2022-02-02 PROCEDURE — 99214 OFFICE O/P EST MOD 30 MIN: CPT | Performed by: INTERNAL MEDICINE

## 2022-02-02 NOTE — PATIENT INSTRUCTIONS
1. Due to your history of Sommers's esophagus, we will schedule an EGD.    2. Continue omeprazole.

## 2022-02-10 ENCOUNTER — PREP FOR SURGERY (OUTPATIENT)
Dept: OTHER | Facility: HOSPITAL | Age: 67
End: 2022-02-10

## 2022-02-10 DIAGNOSIS — K22.70 BARRETT'S ESOPHAGUS WITHOUT DYSPLASIA: Primary | ICD-10-CM

## 2022-02-10 DIAGNOSIS — Z12.11 COLON CANCER SCREENING: ICD-10-CM

## 2022-02-10 DIAGNOSIS — Z86.010 HISTORY OF COLON POLYPS: ICD-10-CM

## 2022-02-10 NOTE — ADDENDUM NOTE
Addended by: ALBERTO CHRISTY on: 2/10/2022 08:49 AM     Modules accepted: Level of Service, SmartSet

## 2022-02-18 ENCOUNTER — TELEPHONE (OUTPATIENT)
Dept: GASTROENTEROLOGY | Facility: CLINIC | Age: 67
End: 2022-02-18

## 2022-02-18 NOTE — TELEPHONE ENCOUNTER
This patient is a resident at a group [facility].  Pharmacist is requesting script for prep for EGD on 3/9/22 be faxed for   Dulcolax tablets and/or Miralax powder     Pharmacy# is correct in chart

## 2022-03-07 ENCOUNTER — LAB (OUTPATIENT)
Dept: LAB | Facility: HOSPITAL | Age: 67
End: 2022-03-07

## 2022-03-07 DIAGNOSIS — Z12.11 COLON CANCER SCREENING: ICD-10-CM

## 2022-03-07 DIAGNOSIS — Z86.010 HISTORY OF COLON POLYPS: ICD-10-CM

## 2022-03-07 DIAGNOSIS — K22.70 BARRETT'S ESOPHAGUS WITHOUT DYSPLASIA: ICD-10-CM

## 2022-03-07 LAB — SARS-COV-2 ORF1AB RESP QL NAA+PROBE: NOT DETECTED

## 2022-03-07 PROCEDURE — C9803 HOPD COVID-19 SPEC COLLECT: HCPCS

## 2022-03-07 PROCEDURE — U0004 COV-19 TEST NON-CDC HGH THRU: HCPCS

## 2022-03-07 PROCEDURE — U0005 INFEC AGEN DETEC AMPLI PROBE: HCPCS

## 2022-03-08 ENCOUNTER — APPOINTMENT (OUTPATIENT)
Dept: CT IMAGING | Facility: HOSPITAL | Age: 67
End: 2022-03-08

## 2022-03-08 ENCOUNTER — HOSPITAL ENCOUNTER (INPATIENT)
Facility: HOSPITAL | Age: 67
LOS: 7 days | Discharge: HOME | End: 2022-03-16
Attending: EMERGENCY MEDICINE | Admitting: INTERNAL MEDICINE

## 2022-03-08 DIAGNOSIS — J96.00 ACUTE RESPIRATORY FAILURE, UNSPECIFIED WHETHER WITH HYPOXIA OR HYPERCAPNIA: Primary | ICD-10-CM

## 2022-03-08 DIAGNOSIS — R56.9 SEIZURE: ICD-10-CM

## 2022-03-08 DIAGNOSIS — E87.20 LACTIC ACIDOSIS: ICD-10-CM

## 2022-03-08 PROCEDURE — 80053 COMPREHEN METABOLIC PANEL: CPT | Performed by: EMERGENCY MEDICINE

## 2022-03-08 PROCEDURE — 99291 CRITICAL CARE FIRST HOUR: CPT

## 2022-03-08 PROCEDURE — 84145 PROCALCITONIN (PCT): CPT | Performed by: INTERNAL MEDICINE

## 2022-03-08 PROCEDURE — U0003 INFECTIOUS AGENT DETECTION BY NUCLEIC ACID (DNA OR RNA); SEVERE ACUTE RESPIRATORY SYNDROME CORONAVIRUS 2 (SARS-COV-2) (CORONAVIRUS DISEASE [COVID-19]), AMPLIFIED PROBE TECHNIQUE, MAKING USE OF HIGH THROUGHPUT TECHNOLOGIES AS DESCRIBED BY CMS-2020-01-R: HCPCS | Performed by: EMERGENCY MEDICINE

## 2022-03-08 PROCEDURE — 85025 COMPLETE CBC W/AUTO DIFF WBC: CPT | Performed by: EMERGENCY MEDICINE

## 2022-03-08 PROCEDURE — 84484 ASSAY OF TROPONIN QUANT: CPT | Performed by: EMERGENCY MEDICINE

## 2022-03-08 PROCEDURE — 93010 ELECTROCARDIOGRAM REPORT: CPT | Performed by: INTERNAL MEDICINE

## 2022-03-08 PROCEDURE — 84443 ASSAY THYROID STIM HORMONE: CPT | Performed by: INTERNAL MEDICINE

## 2022-03-08 PROCEDURE — U0005 INFEC AGEN DETEC AMPLI PROBE: HCPCS | Performed by: EMERGENCY MEDICINE

## 2022-03-08 PROCEDURE — 85610 PROTHROMBIN TIME: CPT | Performed by: EMERGENCY MEDICINE

## 2022-03-08 PROCEDURE — 85730 THROMBOPLASTIN TIME PARTIAL: CPT | Performed by: EMERGENCY MEDICINE

## 2022-03-08 PROCEDURE — 83605 ASSAY OF LACTIC ACID: CPT | Performed by: EMERGENCY MEDICINE

## 2022-03-08 PROCEDURE — 0 LEVETIRACETAM IN NACL 0.75% 1000 MG/100ML SOLUTION: Performed by: EMERGENCY MEDICINE

## 2022-03-08 PROCEDURE — 93005 ELECTROCARDIOGRAM TRACING: CPT | Performed by: EMERGENCY MEDICINE

## 2022-03-08 RX ORDER — SODIUM CHLORIDE 0.9 % (FLUSH) 0.9 %
10 SYRINGE (ML) INJECTION AS NEEDED
Status: DISCONTINUED | OUTPATIENT
Start: 2022-03-08 | End: 2022-03-16 | Stop reason: HOSPADM

## 2022-03-08 RX ORDER — LEVETIRACETAM 10 MG/ML
1000 INJECTION INTRAVASCULAR ONCE
Status: COMPLETED | OUTPATIENT
Start: 2022-03-08 | End: 2022-03-09

## 2022-03-08 RX ORDER — SUCCINYLCHOLINE CHLORIDE 20 MG/ML
100 INJECTION INTRAMUSCULAR; INTRAVENOUS ONCE
Status: COMPLETED | OUTPATIENT
Start: 2022-03-09 | End: 2022-03-09

## 2022-03-08 RX ORDER — ETOMIDATE 2 MG/ML
20 INJECTION INTRAVENOUS ONCE
Status: COMPLETED | OUTPATIENT
Start: 2022-03-09 | End: 2022-03-09

## 2022-03-08 RX ADMIN — LEVETIRACETAM 1000 MG: 1000 INJECTION, SOLUTION INTRAVENOUS at 23:55

## 2022-03-09 ENCOUNTER — APPOINTMENT (OUTPATIENT)
Dept: NEUROLOGY | Facility: HOSPITAL | Age: 67
End: 2022-03-09

## 2022-03-09 ENCOUNTER — APPOINTMENT (OUTPATIENT)
Dept: CT IMAGING | Facility: HOSPITAL | Age: 67
End: 2022-03-09

## 2022-03-09 ENCOUNTER — APPOINTMENT (OUTPATIENT)
Dept: GENERAL RADIOLOGY | Facility: HOSPITAL | Age: 67
End: 2022-03-09

## 2022-03-09 PROBLEM — J96.00 ACUTE RESPIRATORY FAILURE, UNSPECIFIED WHETHER WITH HYPOXIA OR HYPERCAPNIA: Status: ACTIVE | Noted: 2022-03-09

## 2022-03-09 LAB
ALBUMIN SERPL-MCNC: 3.6 G/DL (ref 3.5–5.2)
ALBUMIN/GLOB SERPL: 0.9 G/DL
ALP SERPL-CCNC: 116 U/L (ref 39–117)
ALT SERPL W P-5'-P-CCNC: 26 U/L (ref 1–41)
ANION GAP SERPL CALCULATED.3IONS-SCNC: 19.9 MMOL/L (ref 5–15)
APTT PPP: 28 SECONDS (ref 22.7–35.4)
ARTERIAL PATENCY WRIST A: POSITIVE
AST SERPL-CCNC: 30 U/L (ref 1–40)
ATMOSPHERIC PRESS: 749.4 MMHG
BASE EXCESS BLDA CALC-SCNC: -2.5 MMOL/L (ref 0–2)
BASOPHILS # BLD AUTO: 0.05 10*3/MM3 (ref 0–0.2)
BASOPHILS NFR BLD AUTO: 0.5 % (ref 0–1.5)
BDY SITE: ABNORMAL
BILIRUB SERPL-MCNC: 0.2 MG/DL (ref 0–1.2)
BUN SERPL-MCNC: 4 MG/DL (ref 8–23)
BUN/CREAT SERPL: 5.7 (ref 7–25)
CALCIUM SPEC-SCNC: 9.5 MG/DL (ref 8.6–10.5)
CHLORIDE SERPL-SCNC: 99 MMOL/L (ref 98–107)
CO2 SERPL-SCNC: 16.1 MMOL/L (ref 22–29)
CREAT SERPL-MCNC: 0.7 MG/DL (ref 0.76–1.27)
D-LACTATE SERPL-SCNC: 4.3 MMOL/L (ref 0.5–2)
D-LACTATE SERPL-SCNC: 8.6 MMOL/L (ref 0.5–2)
DEPRECATED RDW RBC AUTO: 49 FL (ref 37–54)
EGFRCR SERPLBLD CKD-EPI 2021: 102.3 ML/MIN/1.73
EOSINOPHIL # BLD AUTO: 0.04 10*3/MM3 (ref 0–0.4)
EOSINOPHIL NFR BLD AUTO: 0.4 % (ref 0.3–6.2)
ERYTHROCYTE [DISTWIDTH] IN BLOOD BY AUTOMATED COUNT: 14.6 % (ref 12.3–15.4)
GLOBULIN UR ELPH-MCNC: 3.8 GM/DL
GLUCOSE SERPL-MCNC: 121 MG/DL (ref 65–99)
HCO3 BLDA-SCNC: 23.2 MMOL/L (ref 22–28)
HCT VFR BLD AUTO: 44.8 % (ref 37.5–51)
HGB BLD-MCNC: 15.1 G/DL (ref 13–17.7)
IMM GRANULOCYTES # BLD AUTO: 0.07 10*3/MM3 (ref 0–0.05)
IMM GRANULOCYTES NFR BLD AUTO: 0.6 % (ref 0–0.5)
INHALED O2 CONCENTRATION: 100 %
INR PPP: 1.03 (ref 0.9–1.1)
LYMPHOCYTES # BLD AUTO: 1.44 10*3/MM3 (ref 0.7–3.1)
LYMPHOCYTES NFR BLD AUTO: 13.2 % (ref 19.6–45.3)
MCH RBC QN AUTO: 31.1 PG (ref 26.6–33)
MCHC RBC AUTO-ENTMCNC: 33.7 G/DL (ref 31.5–35.7)
MCV RBC AUTO: 92.2 FL (ref 79–97)
MODALITY: ABNORMAL
MONOCYTES # BLD AUTO: 0.66 10*3/MM3 (ref 0.1–0.9)
MONOCYTES NFR BLD AUTO: 6 % (ref 5–12)
NEUTROPHILS NFR BLD AUTO: 79.3 % (ref 42.7–76)
NEUTROPHILS NFR BLD AUTO: 8.68 10*3/MM3 (ref 1.7–7)
NRBC BLD AUTO-RTO: 0 /100 WBC (ref 0–0.2)
O2 A-A PPRESDIFF RESPIRATORY: 0.3 MMHG
PCO2 BLDA: 42.1 MM HG (ref 35–45)
PEEP RESPIRATORY: 7.5 CM[H2O]
PH BLDA: 7.35 PH UNITS (ref 7.35–7.45)
PLATELET # BLD AUTO: 266 10*3/MM3 (ref 140–450)
PMV BLD AUTO: 10.4 FL (ref 6–12)
PO2 BLDA: 238.6 MM HG (ref 80–100)
POTASSIUM SERPL-SCNC: 3.2 MMOL/L (ref 3.5–5.2)
PROCALCITONIN SERPL-MCNC: 0.04 NG/ML (ref 0–0.25)
PROT SERPL-MCNC: 7.4 G/DL (ref 6–8.5)
PROTHROMBIN TIME: 13.4 SECONDS (ref 11.7–14.2)
QT INTERVAL: 376 MS
RBC # BLD AUTO: 4.86 10*6/MM3 (ref 4.14–5.8)
SAO2 % BLDCOA: 99.8 % (ref 92–99)
SARS-COV-2 RNA RESP QL NAA+PROBE: NOT DETECTED
SET MECH RESP RATE: 16
SODIUM SERPL-SCNC: 135 MMOL/L (ref 136–145)
TOTAL RATE: 17 BREATHS/MINUTE
TRIGL SERPL-MCNC: 68 MG/DL (ref 0–150)
TROPONIN T SERPL-MCNC: <0.01 NG/ML (ref 0–0.03)
TSH SERPL DL<=0.05 MIU/L-ACNC: 3.2 UIU/ML (ref 0.27–4.2)
VENTILATOR MODE: AC
VT ON VENT VENT: 550 ML
WBC NRBC COR # BLD: 10.94 10*3/MM3 (ref 3.4–10.8)

## 2022-03-09 PROCEDURE — 0 LEVETIRACETAM IN NACL 0.75% 1000 MG/100ML SOLUTION: Performed by: INTERNAL MEDICINE

## 2022-03-09 PROCEDURE — 0 POTASSIUM CHLORIDE 10 MEQ/100ML SOLUTION: Performed by: INTERNAL MEDICINE

## 2022-03-09 PROCEDURE — 83605 ASSAY OF LACTIC ACID: CPT | Performed by: EMERGENCY MEDICINE

## 2022-03-09 PROCEDURE — 94799 UNLISTED PULMONARY SVC/PX: CPT

## 2022-03-09 PROCEDURE — 87147 CULTURE TYPE IMMUNOLOGIC: CPT | Performed by: INTERNAL MEDICINE

## 2022-03-09 PROCEDURE — 84478 ASSAY OF TRIGLYCERIDES: CPT | Performed by: INTERNAL MEDICINE

## 2022-03-09 PROCEDURE — 99223 1ST HOSP IP/OBS HIGH 75: CPT | Performed by: PSYCHIATRY & NEUROLOGY

## 2022-03-09 PROCEDURE — 80175 DRUG SCREEN QUAN LAMOTRIGINE: CPT | Performed by: INTERNAL MEDICINE

## 2022-03-09 PROCEDURE — 36600 WITHDRAWAL OF ARTERIAL BLOOD: CPT

## 2022-03-09 PROCEDURE — 87070 CULTURE OTHR SPECIMN AEROBIC: CPT | Performed by: INTERNAL MEDICINE

## 2022-03-09 PROCEDURE — 70450 CT HEAD/BRAIN W/O DYE: CPT

## 2022-03-09 PROCEDURE — 71045 X-RAY EXAM CHEST 1 VIEW: CPT

## 2022-03-09 PROCEDURE — 82803 BLOOD GASES ANY COMBINATION: CPT

## 2022-03-09 PROCEDURE — 31500 INSERT EMERGENCY AIRWAY: CPT

## 2022-03-09 PROCEDURE — 25010000002 PROPOFOL 10 MG/ML EMULSION: Performed by: EMERGENCY MEDICINE

## 2022-03-09 PROCEDURE — 5A1935Z RESPIRATORY VENTILATION, LESS THAN 24 CONSECUTIVE HOURS: ICD-10-PCS | Performed by: INTERNAL MEDICINE

## 2022-03-09 PROCEDURE — 94760 N-INVAS EAR/PLS OXIMETRY 1: CPT

## 2022-03-09 PROCEDURE — 87040 BLOOD CULTURE FOR BACTERIA: CPT | Performed by: INTERNAL MEDICINE

## 2022-03-09 PROCEDURE — 25010000002 ENOXAPARIN PER 10 MG: Performed by: INTERNAL MEDICINE

## 2022-03-09 PROCEDURE — 95816 EEG AWAKE AND DROWSY: CPT

## 2022-03-09 PROCEDURE — 94002 VENT MGMT INPAT INIT DAY: CPT

## 2022-03-09 PROCEDURE — 95816 EEG AWAKE AND DROWSY: CPT | Performed by: PSYCHIATRY & NEUROLOGY

## 2022-03-09 PROCEDURE — 25010000002 SUCCINYLCHOLINE PER 20 MG: Performed by: EMERGENCY MEDICINE

## 2022-03-09 PROCEDURE — 87205 SMEAR GRAM STAIN: CPT | Performed by: INTERNAL MEDICINE

## 2022-03-09 PROCEDURE — 25010000002 PIPERACILLIN SOD-TAZOBACTAM PER 1 G: Performed by: INTERNAL MEDICINE

## 2022-03-09 PROCEDURE — 87150 DNA/RNA AMPLIFIED PROBE: CPT | Performed by: INTERNAL MEDICINE

## 2022-03-09 RX ORDER — SODIUM CHLORIDE 9 MG/ML
75 INJECTION, SOLUTION INTRAVENOUS CONTINUOUS
Status: DISCONTINUED | OUTPATIENT
Start: 2022-03-09 | End: 2022-03-11

## 2022-03-09 RX ORDER — SODIUM CHLORIDE 0.9 % (FLUSH) 0.9 %
10 SYRINGE (ML) INJECTION AS NEEDED
Status: DISCONTINUED | OUTPATIENT
Start: 2022-03-09 | End: 2022-03-16 | Stop reason: HOSPADM

## 2022-03-09 RX ORDER — LAMOTRIGINE 100 MG/1
100 TABLET ORAL 2 TIMES DAILY
Status: DISCONTINUED | OUTPATIENT
Start: 2022-03-09 | End: 2022-03-16 | Stop reason: HOSPADM

## 2022-03-09 RX ORDER — SODIUM CHLORIDE 0.9 % (FLUSH) 0.9 %
10 SYRINGE (ML) INJECTION EVERY 12 HOURS SCHEDULED
Status: DISCONTINUED | OUTPATIENT
Start: 2022-03-09 | End: 2022-03-16 | Stop reason: HOSPADM

## 2022-03-09 RX ORDER — LEVETIRACETAM 10 MG/ML
1000 INJECTION INTRAVASCULAR EVERY 12 HOURS SCHEDULED
Status: DISCONTINUED | OUTPATIENT
Start: 2022-03-09 | End: 2022-03-10

## 2022-03-09 RX ORDER — FAMOTIDINE 10 MG/ML
20 INJECTION, SOLUTION INTRAVENOUS 2 TIMES DAILY
Status: DISCONTINUED | OUTPATIENT
Start: 2022-03-09 | End: 2022-03-09

## 2022-03-09 RX ORDER — CHLORHEXIDINE GLUCONATE 0.12 MG/ML
15 RINSE ORAL EVERY 12 HOURS SCHEDULED
Status: DISCONTINUED | OUTPATIENT
Start: 2022-03-09 | End: 2022-03-09

## 2022-03-09 RX ORDER — POTASSIUM CHLORIDE 7.45 MG/ML
10 INJECTION INTRAVENOUS
Status: DISCONTINUED | OUTPATIENT
Start: 2022-03-09 | End: 2022-03-16 | Stop reason: HOSPADM

## 2022-03-09 RX ORDER — LEVOTHYROXINE SODIUM 0.12 MG/1
125 TABLET ORAL
Status: DISCONTINUED | OUTPATIENT
Start: 2022-03-09 | End: 2022-03-16 | Stop reason: HOSPADM

## 2022-03-09 RX ORDER — CASTOR OIL AND BALSAM, PERU 788; 87 MG/G; MG/G
1 OINTMENT TOPICAL EVERY 12 HOURS SCHEDULED
Status: DISCONTINUED | OUTPATIENT
Start: 2022-03-09 | End: 2022-03-16 | Stop reason: HOSPADM

## 2022-03-09 RX ADMIN — PROPOFOL 5 MCG/KG/MIN: 10 INJECTION, EMULSION INTRAVENOUS at 00:10

## 2022-03-09 RX ADMIN — ZINC OXIDE 1 APPLICATION: 200 OINTMENT TOPICAL at 13:00

## 2022-03-09 RX ADMIN — ZINC OXIDE 1 APPLICATION: 200 OINTMENT TOPICAL at 21:00

## 2022-03-09 RX ADMIN — TAZOBACTAM SODIUM AND PIPERACILLIN SODIUM 3.38 G: 375; 3 INJECTION, SOLUTION INTRAVENOUS at 11:00

## 2022-03-09 RX ADMIN — POTASSIUM CHLORIDE 10 MEQ: 7.46 INJECTION, SOLUTION INTRAVENOUS at 04:15

## 2022-03-09 RX ADMIN — SODIUM CHLORIDE 1000 ML: 9 INJECTION, SOLUTION INTRAVENOUS at 04:03

## 2022-03-09 RX ADMIN — SUCCINYLCHOLINE CHLORIDE 100 MG: 20 INJECTION, SOLUTION INTRAMUSCULAR; INTRAVENOUS; PARENTERAL at 00:03

## 2022-03-09 RX ADMIN — LEVETIRACETAM 1000 MG: 10 INJECTION INTRAVASCULAR at 08:47

## 2022-03-09 RX ADMIN — SODIUM CHLORIDE 75 ML/HR: 9 INJECTION, SOLUTION INTRAVENOUS at 04:33

## 2022-03-09 RX ADMIN — ZINC OXIDE 1 APPLICATION: 200 OINTMENT TOPICAL at 17:00

## 2022-03-09 RX ADMIN — Medication 10 ML: at 08:34

## 2022-03-09 RX ADMIN — POTASSIUM CHLORIDE 10 MEQ: 7.46 INJECTION, SOLUTION INTRAVENOUS at 06:52

## 2022-03-09 RX ADMIN — ENOXAPARIN SODIUM 40 MG: 100 INJECTION SUBCUTANEOUS at 08:47

## 2022-03-09 RX ADMIN — ETOMIDATE 20 MG: 40 INJECTION, SOLUTION INTRAVENOUS at 00:01

## 2022-03-09 RX ADMIN — CASTOR OIL AND BALSAM, PERU 1 APPLICATION: 788; 87 OINTMENT TOPICAL at 21:00

## 2022-03-09 RX ADMIN — LEVETIRACETAM 1000 MG: 10 INJECTION INTRAVASCULAR at 20:58

## 2022-03-09 RX ADMIN — CHLORHEXIDINE GLUCONATE 15 ML: 1.2 RINSE ORAL at 08:50

## 2022-03-09 RX ADMIN — FAMOTIDINE 20 MG: 10 INJECTION INTRAVENOUS at 08:47

## 2022-03-09 RX ADMIN — TAZOBACTAM SODIUM AND PIPERACILLIN SODIUM 3.38 G: 375; 3 INJECTION, SOLUTION INTRAVENOUS at 05:33

## 2022-03-09 RX ADMIN — TAZOBACTAM SODIUM AND PIPERACILLIN SODIUM 3.38 G: 375; 3 INJECTION, SOLUTION INTRAVENOUS at 18:26

## 2022-03-09 RX ADMIN — Medication 10 ML: at 20:59

## 2022-03-09 NOTE — PROGRESS NOTES
[facility]     Mar/chart reviewed  Follow up seizure resulting in intubation,   Patient sedated on vent,  Unable to provide subjective    Vital Sign Min/Max for last 24 hours  Temp  Min: 97.8 °F (36.6 °C)  Max: 98.8 °F (37.1 °C)   BP  Min: 85/54  Max: 135/84   Pulse  Min: 62  Max: 90   Resp  Min: 30  Max: 30   SpO2  Min: 94 %  Max: 100 %   Flow (L/min)  Min: 15  Max: 15   Weight  Min: 79.3 kg (174 lb 13.2 oz)  Max: 87.3 kg (192 lb 7.4 oz)   1600/not recorded  Appears ill, sedated on vent  Perrl,normal sclera  mmm, no jvd, trachea midline, neck supple,  chest cta bilaterally, no crackles, no wheezes,   rrr,   soft, nt, nd +bs,  no c/c/e  Skin warm, dry no rashes    Labs: 3/9: reviewed:  Lactate 4.3  7.35/42/238      A/P  1. Seizure -- on keppra and lamictal, neurology to see  2. Mechanical ventilation -- wean fi02 and peep trial sbt  3. Lactic acidosis -- 2/2 seizure  4. Electrolyte abnormalities -- correct as needed  5. GERD -- pepcid  6. Hypothyroidism -- continue synthroid        CC 35 mins

## 2022-03-09 NOTE — CASE MANAGEMENT/SOCIAL WORK
"Spoke with pts guardian. Pt lives in a group home. Stated that pt is a DNR, however, informed guardian that [facility] has no paperwork. Informed guardian that pt is post-ictal and that he needs to secure and maintain his airway and that the ER MD feels as if he should be intubated.GuardianChanell stated, \"to place him on breathing machine to keep him breathing\". When questioned if his heart should stop beating, guardianChanell, stated, \"Ned stated a long time ago that he did not want any chest compressions\". Informed ER MD, Dr. Michelle.   "

## 2022-03-09 NOTE — H&P
Patient Care Team:  Seth Villanueva MD as PCP - General (Internal Medicine)  Cedric Kwok MD as Consulting Physician (Gastroenterology)    Chief complaint:  Seizure    History of present illness:  Subjective   This is a 66-year-old male patient, resident of a group home, with history of seizure on Keppra and Lamictal.  No reports of recent seizure..  His baseline mental status is unknown.    He was getting a bowel prep today and then had Generalized tonico clonic seizure which continued up until her got to the ED. He received 10 mg intranasal Versed in route by EMS with no relief.     Due to seizure and altered mental status, he was intubated in the ED. He received succinylcholine 100 mg and etomidate 20 mg for intubation.. He also received Keppra 1 g.  Seizure stopped afterward.    He is currently on mechanical ventilation with AC VC: 16/550/60 %/7.5.  He is sedated with low-dose propofol.  He does not respond to verbal stimuli but withdraws extremities slightly to pain.        Labs:  ABG on the vent: 7.35/42/238 100%; sodium 135; potassium 3.2; bicarb 16; anion gap 20; lactic acid 8.6; WBC, hemoglobin and platelets normal    Review of Systems:  Cannot obtain due to mechanical admission.    History  Past Medical History:   Diagnosis Date   • Anxiety    • Arthritis    • Sommers's esophagus    • Cardiomegaly    • Cerebral palsy (HCC)    • Constipation    • Disease of thyroid gland    • GERD (gastroesophageal reflux disease)    • Hyperlipidemia    • Osteoporosis    • Parkinson disease (HCC)    • Pre-operative cardiovascular examination    • Raynaud's disease    • Right hemiplegia (HCC)    • Seizures (HCC)      Past Surgical History:   Procedure Laterality Date   • FEMUR IM NAILING/RODDING Right 10/11/2021    Procedure: Right Femur Intramedullary Rodding;  Surgeon: Josse Carrillo MD;  Location: [facility] OR;  Service: Orthopedics;  Laterality: Right;     Family History   Problem  Relation Age of Onset   • No Known Problems Mother    • No Known Problems Father      Social History     Tobacco Use   • Smoking status: Never Smoker   • Smokeless tobacco: Never Used   Vaping Use   • Vaping Use: Never used   Substance Use Topics   • Alcohol use: No   • Drug use: No     E-cigarette/Vaping   • E-cigarette/Vaping Use Never User      Allergies:  Lovastatin    Objective   Vital Signs  Temp:  [97.8 °F (36.6 °C)] 97.8 °F (36.6 °C)  Heart Rate:  [90] 90  Resp:  [30] 30  BP: (105)/(90) 105/90  FiO2 (%):  [100 %] 100 %    PPE used per hospital policy    Physical Exam:  Constitutional: Not in acute distress.  On the ventilator.  Eyes: Injected conjunctivae, EOMI. Pupils equal and reactive to light.   ENMT: ET tube noted.  External ears normal.  Neck: No thyromegaly.  Trachea midline.  Large neck  Heart: RRR, no murmur.  Pitting edema in legs.  Lungs: Equal air entry bilaterally.  Mild rhonchi on the right.  Abdomen: Obese. Soft. No tenderness or dullness.  Positive bowel sounds  Extremities: No cyanosis, clubbing.   Warm extremities and well-perfused  Neuro: On the ventilator.  Sedated.  Does not follow commands.  Slightly withdraw extremities to pain  Psych: Cannot assess   Integumentary: No rash or ecchymosis  Lymphatic: No palpable cervical or supraclavicular lymph nodes.      Diagnostic imaging:  I personally and independently reviewed the following images:   CXR 3/9/2022: ET tube in good position.  No low lung volumes.  Cardiomegaly.  ET tube in good position.  Questionable right lower lobe infiltrates        Assessment   1. Status epilepticus  2. Mechanical ventilation for airway protection  3. Abdominal CXR: Low lung volumes and lower lobe infiltrate/atelectasis.  4. Lactic acidosis, likely secondary to seizure  5. Electrolytes disturbance: Hyponatremia (clinically insignificant).  Hypokalemia  .  Pertinent medical problems:  · GERD/Sommers's esophagus  · Cerebral  palsy  · Hypothyroidism    Plan:    · Admit to ICU  · Mechanical ventilation management: TV reduced to 450.  Continue titrating FiO2.  Daily weaning trials.  · Ventilator bundle  · Keppra 1000 mg twice daily.  Continue Lamictal home dose.  Check lamotrigine level.  Check EEG.  Consult neurology.  · DVT prophylaxis with Lovenox  · Follow-up lactic acid  · Check sputum culture, blood culture x2 and procalcitonin.  Empiric Zosyn awaiting labs.  Could discontinue early if negative pro-Des and cultures  · Resume levothyroxine  · Replace potassium  · Pepcid prophylaxis    Discussed with the ED provider    Chavo Kerns MD  03/09/22  02:03 EST    Time: Critical care 41 min      This note was dictated utilizing MVERSE dictation

## 2022-03-09 NOTE — CONSULTS
Neurology Consult Note    Consult Date: 3/9/2022    Referring MD: Chavo Kerns MD    Reason for Consult I have been asked to see the patient in neurological consultation to render advice and opinion regarding seizure    Ned Basilio is a 66 y.o. male with cerebral palsy, developmental delay, fairly well controlled epilepsy, hyperlipidemia, thyroid disease, admitted to the hospital for multiple seizures.  He had a seizure at his nursing facility and was seizing when EMS arrived to pick him up.  He continued to have seizures in route to the [facility] emergency department.  He received a total of 10 mg of intranasal Versed prior to arrival.  Lactic acid was 8.6 on arrival to the ED and then came down to 4.  He has not had additional clinical seizures since admission.  He was given IV Keppra 1000 mg in the emergency department and intubated for airway protection due to depressed mental status.  Overnight he has remained stable.  This morning with decrease sedation he has become more alert and interactive without any apparent recurrent clinical seizures.    His home medication list includes Lamictal 100 twice daily and Keppra 1000 twice daily    Past Medical/Surgical Hx:  Past Medical History:   Diagnosis Date   • Anxiety    • Arthritis    • Sommers's esophagus    • Cardiomegaly    • Cerebral palsy (HCC)    • Constipation    • Disease of thyroid gland    • GERD (gastroesophageal reflux disease)    • Hyperlipidemia    • Osteoporosis    • Parkinson disease (HCC)    • Pre-operative cardiovascular examination    • Raynaud's disease    • Right hemiplegia (HCC)    • Seizures (HCC)      Past Surgical History:   Procedure Laterality Date   • FEMUR IM NAILING/RODDING Right 10/11/2021    Procedure: Right Femur Intramedullary Rodding;  Surgeon: Josse Carrillo MD;  [facility];  Service: Orthopedics;  Laterality: Right;       Medications On Admission  Medications Prior to Admission   Medication Sig Dispense Refill  Last Dose   • albuterol (PROVENTIL) (2.5 MG/3ML) 0.083% nebulizer solution Take 2.5 mg by nebulization Every 4 (Four) Hours As Needed for Wheezing.      • aspirin 81 MG EC tablet Take 1 tablet by mouth Daily. 30 tablet 0    • baclofen (LIORESAL) 10 MG tablet Take 10 mg by mouth 2 (Two) Times a Day.      • bisacodyl (LAXATIVE) 10 MG suppository Insert 10 mg into the rectum Daily.      • Calcium Citrate-Vitamin D (CALCIUM CITRATE + D PO) Take 1 tablet by mouth Daily.      • chlorhexidine (PERIDEX) 0.12 % solution       • Cholecalciferol (VITAMIN D-3) 1000 units capsule Take 1,000 Units by mouth 2 (Two) Times a Day.      • dantrolene (DANTRIUM) 25 MG capsule Take 25 mg by mouth Daily.      • Desitin 13 % cream cream       • ezetimibe (ZETIA) 10 MG tablet Take 10 mg by mouth Daily.      • fluticasone (FLONASE) 50 MCG/ACT nasal spray       • HYDROcodone-acetaminophen (NORCO) 7.5-325 MG per tablet Take 1-2 tabs po q 4 hours prn pain 42 tablet 0    • isosorbide dinitrate (ISORDIL) 20 MG tablet Take 20 mg by mouth Every 12 (Twelve) Hours.      • lamoTRIgine (LaMICtal) 100 MG tablet Take 100 mg by mouth 2 (Two) Times a Day.      • levETIRAcetam (KEPPRA) 1000 MG tablet Take 1,000 mg by mouth 2 (Two) Times a Day.      • levothyroxine (SYNTHROID, LEVOTHROID) 125 MCG tablet Take 125 mcg by mouth Daily.      • linaclotide (Linzess) 145 MCG capsule capsule Take 1 capsule by mouth Every Morning Before Breakfast. 90 capsule 3    • metoprolol tartrate (LOPRESSOR) 25 MG tablet Take 25 mg by mouth 2 (Two) Times a Day.      • Mi-Acid Gas Relief 80 MG chewable tablet CHEW 2 TABLETS 2 (TWO) TIMES A DAY FOR 30 DAYS. 120 tablet 11    • miconazole (MICOTIN) 2 % powder Apply  topically to the appropriate area as directed 2 (Two) Times a Day. Apply powder to intertriginous rash two times daily      • mometasone (NASONEX) 50 MCG/ACT nasal spray 2 sprays into the nostril(s) as directed by provider Daily.      • montelukast (SINGULAIR) 10 MG  "tablet Take 10 mg by mouth Every Night.      • Multiple Vitamins-Iron (TAB-A-KATE/IRON PO) Take 1 tablet by mouth Daily.      • omeprazole (priLOSEC) 40 MG capsule Take 40 mg by mouth Every 12 (Twelve) Hours.      • PARoxetine (PAXIL) 30 MG tablet       • Plenvu 140 g reconstituted solution solution TAKE 19,600 G BY MOUTH TAKE AS DIRECTED. PLEASE FOLLOW PREP INSTRUCTIONS. 3 each 0    • polyethylene glycol (polyethylene glycol) 17 g packet Take 17 g by mouth Daily.      • senna (SENOKOT) 8.6 MG tablet tablet Take 3 tablets by mouth 2 (Two) Times a Day.      • silver sulfadiazine (SILVADENE, SSD) 1 % cream       • traZODone (DESYREL) 100 MG tablet       • vitamin D (ERGOCALCIFEROL) 1.25 MG (21711 UT) capsule capsule Take 1 capsule by mouth Every 7 (Seven) Days. 5 capsule 0    • Vitamins/Minerals tablet tablet           Allergies:  Allergies   Allergen Reactions   • Lovastatin Anaphylaxis     Unknown reaction; Facility did not provide information.        Social Hx:  Social History     Socioeconomic History   • Marital status: Single   Tobacco Use   • Smoking status: Never Smoker   • Smokeless tobacco: Never Used   Vaping Use   • Vaping Use: Never used   Substance and Sexual Activity   • Alcohol use: No   • Drug use: No       Family Hx:  Family History   Problem Relation Age of Onset   • No Known Problems Mother    • No Known Problems Father        Review of systems  Unable to obtain, intubated    Exam    BP 93/62   Pulse 74   Temp 98.8 °F (37.1 °C) (Oral)   Resp 23   Ht 152.4 cm (60\")   Wt 79.3 kg (174 lb 13.2 oz)   SpO2 95%   BMI 34.14 kg/m²   Gen: NAD, comatose  Eyes: Fundi sharp bilaterally, no papilledema, PERRL, conjugate gaze, VOR intact, corneal reflexes intact.  HEENT: Atraumatic, dysmorphic facies, + cough to suction  Neck: No nuchal rigidity, no thyromegaly  Lungs: Mechanically ventilated, rhonchi  GI: Soft, nontender, nondistended  Cards: RRR S1 S2  Ext: No edema, no cyanosis  Skin: Warm, dry, + rash " bilateral feet  Neuro: Opens eyes to voice, does not follow commands but attends examiner visually, chronic appearing dystonic posture of bilateral upper extremities. Normal tone throughout. No spontaneous movements. Reflexes absent throughout.  Mute plantars, no clonus.    DATA:    Lab Results   Component Value Date    GLUCOSE 121 (H) 03/08/2022    CALCIUM 9.5 03/08/2022     (L) 03/08/2022    K 3.2 (L) 03/08/2022    CO2 16.1 (L) 03/08/2022    CL 99 03/08/2022    BUN 4 (L) 03/08/2022    CREATININE 0.70 (L) 03/08/2022    EGFRIFNONA >150 10/19/2021    BCR 5.7 (L) 03/08/2022    ANIONGAP 19.9 (H) 03/08/2022     Lab Results   Component Value Date    WBC 10.94 (H) 03/08/2022    HGB 15.1 03/08/2022    HCT 44.8 03/08/2022    MCV 92.2 03/08/2022     03/08/2022     No results found for: LDL  No results found for: HGBA1C  Lab Results   Component Value Date    INR 1.03 03/08/2022    INR 1.01 10/10/2021    INR 1.3 11/30/2018    PROTIME 13.4 03/08/2022    PROTIME 13.1 10/10/2021    PROTIME 14.7 (H) 11/30/2018       Lab review: Sodium 135, GFR normal, anion gap 20, WBC 11    Imaging review: I personally reviewed his CT of the head performed in the emergency department this morning which shows some sinusitis but no evidence of stroke or ICH.  Radiology report reviewed    Diagnoses:  Epilepsy, focal onset with complex partial seizures, with status epilepticus, not intractable  Cerebral palsy    Comment: Provoking factor for his seizures is unclear based on initial laboratory evaluation.  Patient appears to have clinically improved and this morning is alert, awake and attentive to examiner.    PLAN:  Routine EEG  Lamictal level  Continue home  bid and LEV 1000 bid  Depending on LTG level we might consider increasing either medication. From chart review his baseline seizure frequency appears to be low    Discussed with Dr. Howard.

## 2022-03-09 NOTE — PROGRESS NOTES
Discharge Planning Assessment  [facility]     Patient Name: Ned Basilio  MRN: [id number]  Today's Date: 3/9/2022    Admit Date: 3/8/2022     Discharge Needs Assessment     Row Name 03/09/22 1025       Living Environment    People in Home other (see comments);facility resident    Current Living Arrangements group home    Potentially Unsafe Housing Conditions unable to assess    Primary Care Provided by other (see comments)    Provides Primary Care For no one    Family Caregiver if Needed other (see comments)    Quality of Family Relationships supportive    Able to Return to Prior Arrangements yes    Living Arrangement Comments [facility]       Resource/Environmental Concerns    Resource/Environmental Concerns other (see comments)       Transition Planning    Patient/Family Anticipated Services at Transition none    Transportation Anticipated other (see comments)       Discharge Needs Assessment    Equipment Currently Used at Home wheelchair    Concerns to be Addressed discharge planning    Anticipated Changes Related to Illness none    Equipment Needed After Discharge other (see comments)    Provided Post Acute Provider List? N/A               Discharge Plan     Row Name 03/09/22 1027       Plan    Plan Return to Group home [facility]    Plan Comments CCP spoke to patients guardian Chanell Annie, [phone] to discuss discharge planning.  CCP role explained  Face sheet verified.  Pt lives in a group home [facility].  He uses a wheel chair.  He is dependent with some ADL's,.  Plan is for him to return to Group home  They obtain his medications for him.  CCP call out to obtain transport information as guardian reports they transport him in a wheel chair van.  CCP following              Continued Care and Services - Admitted Since 3/8/2022    Coordination has not been started for this encounter.          Demographic Summary    No documentation.                Functional Status    No documentation.                 Psychosocial    No documentation.                Abuse/Neglect    No documentation.                Legal    No documentation.                Substance Abuse    No documentation.                Patient Forms    No documentation.                   Mi Munoz RN     sanguineous

## 2022-03-09 NOTE — ED PROVIDER NOTES
EMERGENCY DEPARTMENT ENCOUNTER    CHIEF COMPLAINT  Chief Complaint: Seizure  History given by: EMS report  History limited by: Unresponsive  Room Number: I388/1  PMD: Seth Villanueva MD      HPI:  Pt is a 66 y.o. male who presents to the emergency room after a sudden onset seizure that occurred just prior to ED arrival.  EMS reports that he was actively seizing on their arrival and he continued to seize throughout transport to the emergency room tonight.  They did give him 10 mg of intranasal Versed prior to ED arrival.  They do report that he has a history of seizures however it has been many years and they are unsure if he is on any antiepileptic medication.  They report that his baseline is conversive but are unsure about his chronic orientation.  It is reported that he was receiving a bowel prep for GI procedure tomorrow just prior to the onset of seizure.    Duration: Just prior to ED arrival  Onset: Sudden  Location: Generalized  Radiation: None  Quality: Generalized tonic-clonic seizure activity  Intensity/Severity: Severe  Progression: Currently, seizure resolved and patient is postictal  Associated Symptoms: None known  Aggravating Factors: None  Alleviating Factors: Intranasal Versed  Previous Episodes: Yes, history of seizure  Treatment before arrival: Intranasal Versed    PAST MEDICAL HISTORY  Active Ambulatory Problems     Diagnosis Date Noted   • History of colon polyps 01/20/2021   • Colon cancer screening 01/20/2021   • Complicated UTI (urinary tract infection) 04/16/2013   • Coronary artery disease 04/16/2013   • Fever 04/18/2013   • HCAP (healthcare-associated pneumonia) 04/16/2013   • HLD (hyperlipidemia) 04/16/2013   • Hypothyroidism 08/23/2021   • Septic shock (HCC) 04/16/2013   • Tracheal compression 04/17/2013   • Closed displaced oblique fracture of shaft of right femur (HCC) 10/11/2021   • Cerebral palsy (HCC)    • Parkinson disease (HCC)    • Osteoporosis    • Chronic respiratory  failure with hypoxia (HCC)    • Chronic constipation 10/11/2021   • Seizure disorder (HCC) 10/11/2021   • ABLA (acute blood loss anemia) 10/12/2021   • Sommers's esophagus      Resolved Ambulatory Problems     Diagnosis Date Noted   • Leukocytosis 04/16/2013   • DVT prophylaxis 04/17/2013     Past Medical History:   Diagnosis Date   • Anxiety    • Arthritis    • Cardiomegaly    • Constipation    • Disease of thyroid gland    • GERD (gastroesophageal reflux disease)    • Hyperlipidemia    • Pre-operative cardiovascular examination    • Raynaud's disease    • Right hemiplegia (HCC)    • Seizures (HCC)        PAST SURGICAL HISTORY  Past Surgical History:   Procedure Laterality Date   • FEMUR IM NAILING/RODDING Right 10/11/2021    Procedure: Right Femur Intramedullary Rodding;  Surgeon: Josse Carrillo MD;  Location: [facility];  Service: Orthopedics;  Laterality: Right;       FAMILY HISTORY  Family History   Problem Relation Age of Onset   • No Known Problems Mother    • No Known Problems Father        SOCIAL HISTORY  Social History     Socioeconomic History   • Marital status: Single   Tobacco Use   • Smoking status: Never Smoker   • Smokeless tobacco: Never Used   Vaping Use   • Vaping Use: Never used   Substance and Sexual Activity   • Alcohol use: No   • Drug use: No       ALLERGIES  Lovastatin    REVIEW OF SYSTEMS  Review of Systems   Unable to perform ROS: Patient unresponsive       PHYSICAL EXAM  ED Triage Vitals [03/08/22 2328]   Temp Heart Rate Resp BP SpO2   -- 90 (!) 30 105/90 96 %      Temp src Heart Rate Source Patient Position BP Location FiO2 (%)   -- -- -- -- --       Physical Exam  Vitals and nursing note reviewed.   Constitutional:       General: He is in acute distress.      Comments: Patient nonresponsive and appears to be in a fairly significant post ictal state   HENT:      Head: Normocephalic and atraumatic.   Eyes:      Pupils: Pupils are equal, round, and reactive to light.    Cardiovascular:      Rate and Rhythm: Normal rate and regular rhythm.      Heart sounds: Normal heart sounds.   Pulmonary:      Effort: Pulmonary effort is normal. No respiratory distress.      Breath sounds: Normal breath sounds.   Abdominal:      Palpations: Abdomen is soft.      Tenderness: There is no abdominal tenderness. There is no guarding or rebound.   Musculoskeletal:         General: Normal range of motion.      Cervical back: Normal range of motion and neck supple.   Skin:     General: Skin is warm and dry.   Neurological:      Sensory: Sensation is intact.      Comments: Nonresponsive, chronic upper extremity contractures.  No current seizure activity   Psychiatric:      Comments: Unable to assess         LAB RESULTS  Lab Results (last 24 hours)     Procedure Component Value Units Date/Time    CBC & Differential [[id number]]  (Abnormal) Collected: 03/08/22 2347    Specimen: Blood from Arm, Left Updated: 03/09/22 0034    Narrative:      The following orders were created for panel order CBC & Differential.  Procedure                               Abnormality         Status                     ---------                               -----------         ------                     CBC Auto Differential[[id number]]        Abnormal            Final result                 Please view results for these tests on the individual orders.    Comprehensive Metabolic Panel [[id number]]  (Abnormal) Collected: 03/08/22 2347    Specimen: Blood from Arm, Left Updated: 03/09/22 0050     Glucose 121 mg/dL      BUN 4 mg/dL      Creatinine 0.70 mg/dL      Sodium 135 mmol/L      Potassium 3.2 mmol/L      Comment: Slight hemolysis detected by analyzer. Results may be affected.        Chloride 99 mmol/L      CO2 16.1 mmol/L      Calcium 9.5 mg/dL      Total Protein 7.4 g/dL      Albumin 3.60 g/dL      ALT (SGPT) 26 U/L      AST (SGOT) 30 U/L      Comment: Slight hemolysis detected by analyzer. Results may be affected.         Alkaline Phosphatase 116 U/L      Total Bilirubin 0.2 mg/dL      Globulin 3.8 gm/dL      A/G Ratio 0.9 g/dL      BUN/Creatinine Ratio 5.7     Anion Gap 19.9 mmol/L      eGFR 102.3 mL/min/1.73      Comment: National Kidney Foundation and American Society of Nephrology (ASN) Task Force recommended calculation based on the Chronic Kidney Disease Epidemiology Collaboration (CKD-EPI) equation refit without adjustment for race.       Narrative:      GFR Normal >60  Chronic Kidney Disease <60  Kidney Failure <15      Protime-INR [[id number]]  (Normal) Collected: 03/08/22 2347    Specimen: Blood from Arm, Left Updated: 03/09/22 0045     Protime 13.4 Seconds      INR 1.03    aPTT [[id number]]  (Normal) Collected: 03/08/22 2347    Specimen: Blood from Arm, Left Updated: 03/09/22 0045     PTT 28.0 seconds     Troponin [[id number]]  (Normal) Collected: 03/08/22 2347    Specimen: Blood from Arm, Left Updated: 03/09/22 0039     Troponin T <0.010 ng/mL     Narrative:      Troponin T Reference Range:  <= 0.03 ng/mL-   Negative for AMI  >0.03 ng/mL-     Abnormal for myocardial necrosis.  Clinicians would have to utilize clinical acumen, EKG, Troponin and serial changes to determine if it is an Acute Myocardial Infarction or myocardial injury due to an underlying chronic condition.       Results may be falsely decreased if patient taking Biotin.      Lactic Acid, Plasma [[id number]]  (Abnormal) Collected: 03/08/22 2347    Specimen: Blood from Arm, Left Updated: 03/09/22 0048     Lactate 8.6 mmol/L     CBC Auto Differential [[id number]]  (Abnormal) Collected: 03/08/22 2347    Specimen: Blood from Arm, Left Updated: 03/09/22 0034     WBC 10.94 10*3/mm3      RBC 4.86 10*6/mm3      Hemoglobin 15.1 g/dL      Hematocrit 44.8 %      MCV 92.2 fL      MCH 31.1 pg      MCHC 33.7 g/dL      RDW 14.6 %      RDW-SD 49.0 fl      MPV 10.4 fL      Platelets 266 10*3/mm3      Neutrophil % 79.3 %      Lymphocyte % 13.2 %      Monocyte % 6.0 %       "Eosinophil % 0.4 %      Basophil % 0.5 %      Immature Grans % 0.6 %      Neutrophils, Absolute 8.68 10*3/mm3      Lymphocytes, Absolute 1.44 10*3/mm3      Monocytes, Absolute 0.66 10*3/mm3      Eosinophils, Absolute 0.04 10*3/mm3      Basophils, Absolute 0.05 10*3/mm3      Immature Grans, Absolute 0.07 10*3/mm3      nRBC 0.0 /100 WBC     Procalcitonin [[id number]]  (Normal) Collected: 03/08/22 2347    Specimen: Blood from Arm, Left Updated: 03/09/22 0430     Procalcitonin 0.04 ng/mL     Narrative:      As a Marker for Sepsis (Non-Neonates):    1. <0.5 ng/mL represents a low risk of severe sepsis and/or septic shock.  2. >2 ng/mL represents a high risk of severe sepsis and/or septic shock.    As a Marker for Lower Respiratory Tract Infections that require antibiotic therapy:    PCT on Admission    Antibiotic Therapy       6-12 Hrs later    >0.5                Strongly Recommended  >0.25 - <0.5        Recommended   0.1 - 0.25          Discouraged              Remeasure/reassess PCT  <0.1                Strongly Discouraged     Remeasure/reassess PCT    As 28 day mortality risk marker: \"Change in Procalcitonin Result\" (>80% or <=80%) if Day 0 (or Day 1) and Day 4 values are available. Refer to http://www.[employer]-pct-calculator.com    Change in PCT <=80%  A decrease of PCT levels below or equal to 80% defines a positive change in PCT test result representing a higher risk for 28-day all-cause mortality of patients diagnosed with severe sepsis for septic shock.    Change in PCT >80%  A decrease of PCT levels of more than 80% defines a negative change in PCT result representing a lower risk for 28-day all-cause mortality of patients diagnosed with severe sepsis or septic shock.       COVID PRE-OP / PRE-PROCEDURE SCREENING ORDER (NO ISOLATION) - Swab, Nasopharynx [[id number]]  (Normal) Collected: 03/08/22 2351    Specimen: Swab from Nasopharynx Updated: 03/09/22 0057    Narrative:      The following orders were created for " panel order COVID PRE-OP / PRE-PROCEDURE SCREENING ORDER (NO ISOLATION) - Swab, Nasopharynx.  Procedure                               Abnormality         Status                     ---------                               -----------         ------                     COVID-19,[facility]HOU[facility]...[[id number]]  Normal              Final result                 Please view results for these tests on the individual orders.    COVID-19,[facility] CEPHEID/KATHRYN NP SWAB IN TRANSPORT MEDIA 8-12 HR TAT - Swab, Nasopharynx [[id number]]  (Normal) Collected: 03/08/22 2351    Specimen: Swab from Nasopharynx Updated: 03/09/22 0057     COVID19 Not Detected    Narrative:      Fact sheet for providers: https://www.fda.gov/media/017104/download     Fact sheet for patients: https://www.fda.gov/media/656233/download    Blood Gas, Arterial - [[id number]]  (Abnormal) Collected: 03/09/22 0159    Specimen: Arterial Blood Updated: 03/09/22 0202     Site Arterial: left radial     Kenny's Test Positive     pH, Arterial 7.349 pH units      pCO2, Arterial 42.1 mm Hg      pO2, Arterial 238.6 mm Hg      HCO3, Arterial 23.2 mmol/L      Base Excess, Arterial -2.5 mmol/L      O2 Saturation Calculated 99.8 %      A-a Gradiant 0.3 mmHg      Barometric Pressure for Blood Gas 749.4 mmHg      Modality Adult Vent     FIO2 100 %      Ventilator Mode AC     Set Tidal Volume 550     Set Mech Resp Rate 16     Rate 17 Breaths/minute      PEEP 7.5     Comment: mavc=361% Meter: [id number] : [id number] Vamshi Del Toro       Lamotrigine Level [[id number]] Collected: 03/09/22 0414    Specimen: Blood Updated: 03/09/22 0417    Respiratory Culture - Sputum, ET Suction [[id number]] Collected: 03/09/22 0437    Specimen: Sputum from ET Suction Updated: 03/09/22 0529    STAT Lactic Acid, Reflex [[id number]]  (Abnormal) Collected: 03/09/22 0511    Specimen: Blood Updated: 03/09/22 0608     Lactate 4.3 mmol/L     Blood Culture - Blood, Arm, Left [[id number]] Collected:  03/09/22 0511    Specimen: Blood from Arm, Left Updated: 03/09/22 0526    Blood Culture - Blood, Arm, Left [[id number]] Collected: 03/09/22 0511    Specimen: Blood from Arm, Left Updated: 03/09/22 0526          I ordered the above labs and reviewed the results    RADIOLOGY  CT Head Without Contrast   Final Result       1. No acute intracranial findings.   2. Sinus inflammatory changes.       Radiation dose reduction techniques were utilized, including automated   exposure control and exposure modulation based on body size.       This report was finalized on 3/9/2022 1:37 AM by Dr. Brianna Greco M.D.          XR Chest 1 View   Final Result   Endotracheal tube terminates in satisfactory position.       This report was finalized on 3/9/2022 12:40 AM by Dr. Brianna Greco M.D.               I ordered the above noted radiological studies. Interpreted by radiologist.  Reviewed by me in PACS.       PROCEDURES  Intubation    Date/Time: 3/9/2022 12:07 AM  Performed by: Vijay Michelle MD  Authorized by: Vijay Michelle MD     Consent:     Consent obtained:  Emergent situation  Universal protocol:     Patient identity confirmed:  Arm band  Pre-procedure details:     Indication: failure to protect airway      Patient status:  Unresponsive    Look externally: no concerns      Mouth opening - incisor distance:  2 finger widths    Mallampati score:  II    Obstruction: none      Neck mobility: reduced      Induction agents:  Etomidate    Paralytics:  Succinylcholine  Procedure details:     Preoxygenation:  Bag valve mask    CPR in progress: no      Intubation method:  Oral    Intubation technique: video assisted      Laryngoscope blade:  Mac 4    Tube size (mm):  8.0    Tube type:  Cuffed    Number of attempts:  1    Tube visualized through cords: yes    Placement assessment:     ETT at teeth/gumline (cm):  23    Tube secured with:  ETT green    Breath sounds:  Equal    Placement verification: chest rise,  direct visualization, equal breath sounds and ETCO2 detector      CXR findings:  Appropriate position  Post-procedure details:     Procedure completion:  Tolerated well, no immediate complications    Critical Care  Performed by: Vijay Michelle MD  Authorized by: Vijay Michelle MD     Critical care provider statement:     Critical care time (minutes):  40    Critical care was necessary to treat or prevent imminent or life-threatening deterioration of the following conditions:  Respiratory failure (Seizure/postictal state)    Critical care was time spent personally by me on the following activities:  Blood draw for specimens, development of treatment plan with patient or surrogate, discussions with consultants, evaluation of patient's response to treatment, examination of patient, obtaining history from patient or surrogate, ordering and performing treatments and interventions, ordering and review of laboratory studies, ordering and review of radiographic studies, pulse oximetry, re-evaluation of patient's condition, review of old charts and ventilator management    EKG          EKG time: 2351  Rhythm/Rate: NSR with PVC, 90  P waves and KY: nml  QRS, axis: nml, nml axis   ST and T waves: Diffuse ST segment elevation    Interpreted Contemporaneously by me, independently viewed  changed compared to prior 3/4/19        PROGRESS AND CONSULTS     The patient was wearing a facemask upon entrance into the room and remained in such throughout their visit.  I was wearing PPE including a facemask, eye protection, as well as gloves at any point entering the room and throughout the visit    2330  The patient is currently somnolent and nonresponsive.  I do believe he is just significantly post ictal at this point however we will continually monitor him for any signs of airway compromise or need for mechanical ventilation.  We will order labs as well as a head CT as we monitor.  We will also give him a loading dose of  IV Keppra for seizure management.    2345  This patient remains fairly tachypneic and nonresponsive and at this point is no longer protecting his airway.  We will need to proceed with intubation and mechanical ventilation for airway protection.    0100  On reexamination, the patient does appear to be resting comfortably and adequately sedated.  Vital signs are currently stable.  The patient's lactic acid is significantly elevated however I do think that is seizure induced.  We will continue fluids as we await the remainder of his labs and CT scan of the head.     0200  Case discussed with Dr. Kerns, pulmonary/ICU, who agrees to admit the patient to the intensive care unit.    MEDICAL DECISION MAKING  Results were reviewed/discussed with the patient and they were also made aware of online access. Pt also made aware that some labs, such as cultures, will not be resulted during ER visit and follow up with PMD is necessary.     MDM  Number of Diagnoses or Management Options     Amount and/or Complexity of Data Reviewed  Clinical lab tests: ordered and reviewed  Tests in the radiology section of CPT®: reviewed and ordered  Tests in the medicine section of CPT®: ordered and reviewed  Review and summarize past medical records: yes (Upon medical records review, the patient was last seen and evaluated on 10/10/2021 following a fall where he sustained a femoral shaft fracture)  Discuss the patient with other providers: yes (Dr. Kerns, pulmonary/ICU, who will admit the patient to the intensive care unit)  Independent visualization of images, tracings, or specimens: yes (No acute abnormality seen on head CT/chest x-ray)           DIAGNOSIS  Final diagnoses:   Acute respiratory failure, unspecified whether with hypoxia or hypercapnia (HCC)   Seizure (HCC)   Lactic acidosis       DISPOSITION  ADMISSION    Discussed treatment plan and reason for admission with pt/family and admitting physician.  Pt/family voiced understanding  of the plan for admission for further testing/treatment as needed.         Latest Documented Vital Signs:  As of 06:58 EST  BP- 98/54 HR- 70 Temp- 98.8 °F (37.1 °C) (Oral) O2 sat- 100%         Vijay Michelle MD  03/09/22 0652

## 2022-03-09 NOTE — ED NOTES
"Nursing report ED to floor  Ned Basilio  66 y.o.  male    HPI :   Chief Complaint   Patient presents with   • Seizures       Admitting doctor:   Chavo Kerns MD    Admitting diagnosis:   The primary encounter diagnosis was Acute respiratory failure, unspecified whether with hypoxia or hypercapnia (HCC). Diagnoses of Seizure (HCC) and Lactic acidosis were also pertinent to this visit.    Code status:   Current Code Status     Date Active Code Status Order ID Comments User Context       Prior    Advance Care Planning Activity          Allergies:   Lovastatin    Intake and Output    Intake/Output Summary (Last 24 hours) at 3/9/2022 0238  Last data filed at 3/9/2022 0039  Gross per 24 hour   Intake 100 ml   Output --   Net 100 ml       Weight:       03/08/22 2353   Weight: 87.3 kg (192 lb 7.4 oz)       Most recent vitals:   Vitals:    03/08/22 2328 03/08/22 2353 03/09/22 0135   BP: 105/90     Pulse: 90     Resp: (!) 30     Temp:   97.8 °F (36.6 °C)   TempSrc:   Oral   SpO2: 96%     Weight:  87.3 kg (192 lb 7.4 oz)    Height: 152.4 cm (60\")         Active LDAs/IV Access:   Lines, Drains & Airways     Active LDAs     Name Placement date Placement time Site Days    Peripheral IV 03/08/22 2345 Left Antecubital 03/08/22 2345  Antecubital  less than 1    ETT  03/09/22  0004  -- less than 1                Labs (abnormal labs have a star):   Labs Reviewed   COMPREHENSIVE METABOLIC PANEL - Abnormal; Notable for the following components:       Result Value    Glucose 121 (*)     BUN 4 (*)     Creatinine 0.70 (*)     Sodium 135 (*)     Potassium 3.2 (*)     CO2 16.1 (*)     BUN/Creatinine Ratio 5.7 (*)     Anion Gap 19.9 (*)     All other components within normal limits    Narrative:     GFR Normal >60  Chronic Kidney Disease <60  Kidney Failure <15     LACTIC ACID, PLASMA - Abnormal; Notable for the following components:    Lactate 8.6 (*)     All other components within normal limits   CBC WITH AUTO DIFFERENTIAL - Abnormal; " Notable for the following components:    WBC 10.94 (*)     Neutrophil % 79.3 (*)     Lymphocyte % 13.2 (*)     Immature Grans % 0.6 (*)     Neutrophils, Absolute 8.68 (*)     Immature Grans, Absolute 0.07 (*)     All other components within normal limits   BLOOD GAS, ARTERIAL - Abnormal; Notable for the following components:    pH, Arterial 7.349 (*)     pO2, Arterial 238.6 (*)     Base Excess, Arterial -2.5 (*)     O2 Saturation Calculated 99.8 (*)     All other components within normal limits   COVID-19, [facility] CEPHEID/KATHRYN, NP SWAB IN TRANSPORT MEDIA 8-12 HR TAT - Normal    Narrative:     Fact sheet for providers: https://www.fda.gov/media/223285/download     Fact sheet for patients: https://www.fda.gov/media/855006/download   PROTIME-INR - Normal   APTT - Normal   TROPONIN (IN-HOUSE) - Normal    Narrative:     Troponin T Reference Range:  <= 0.03 ng/mL-   Negative for AMI  >0.03 ng/mL-     Abnormal for myocardial necrosis.  Clinicians would have to utilize clinical acumen, EKG, Troponin and serial changes to determine if it is an Acute Myocardial Infarction or myocardial injury due to an underlying chronic condition.       Results may be falsely decreased if patient taking Biotin.     COVID PRE-OP / PRE-PROCEDURE SCREENING ORDER (NO ISOLATION)    Narrative:     The following orders were created for panel order COVID PRE-OP / PRE-PROCEDURE SCREENING ORDER (NO ISOLATION) - Swab, Nasopharynx.  Procedure                               Abnormality         Status                     ---------                               -----------         ------                     COVID-19, [facility]-HOU[facility]...[[id number]]  Normal              Final result                 Please view results for these tests on the individual orders.   BLOOD GAS, ARTERIAL   LACTIC ACID, REFLEX   CBC AND DIFFERENTIAL    Narrative:     The following orders were created for panel order CBC & Differential.  Procedure                                Abnormality         Status                     ---------                               -----------         ------                     CBC Auto Differential[[id number]]        Abnormal            Final result                 Please view results for these tests on the individual orders.       EKG:   ECG 12 Lead   Preliminary Result   HEART RATE= 90  bpm   RR Interval= 668  ms   W[place] Interval= 193  ms   P Horizontal Axis= 20  deg   P Front Axis= 23  deg   QRSD Interval= 108  ms   QT Interval= 376  ms   QRS Axis= 5  deg   T Wave Axis= 54  deg   - ABNORMAL ECG -   Sinus rhythm   Multiform ventricular premature complexes   Probable inferior infarct, old   Probable anterolateral infarct, old   Electronically Signed By:    Date and Time of Study: 2022-03-08 23:51:09          Meds given in ED:   Medications   sodium chloride 0.9 % flush 10 mL (has no administration in time range)   propofol (DIPRIVAN) infusion 10 mg/mL 100 mL (5 mcg/kg/min × 87.3 kg Intravenous New Bag 3/9/22 0010)   sodium chloride 0.9 % bolus 1,000 mL (has no administration in time range)   levETIRAcetam in NaCl 0.75% (KEPPRA) IVPB 1,000 mg (0 mg Intravenous Stopped 3/9/22 0039)   etomidate (AMIDATE) injection 20 mg (20 mg Intravenous Given 3/9/22 0001)   succinylcholine (ANECTINE) injection 100 mg (100 mg Intravenous Given 3/9/22 0003)       Imaging results:  CT Head Without Contrast    Result Date: 3/9/2022   1. No acute intracranial findings. 2. Sinus inflammatory changes.  Radiation dose reduction techniques were utilized, including automated exposure control and exposure modulation based on body size.  This report was finalized on 3/9/2022 1:37 AM by Dr. Brianna Greco M.D.      XR Chest 1 View    Result Date: 3/9/2022  Endotracheal tube terminates in satisfactory position.  This report was finalized on 3/9/2022 12:40 AM by Dr. Brianna Greco M.D.        Ambulatory status:   - asst 2    Social issues:   Social History     Socioeconomic History   •  Marital status: Single   Tobacco Use   • Smoking status: Never Smoker   • Smokeless tobacco: Never Used   Vaping Use   • Vaping Use: Never used   Substance and Sexual Activity   • Alcohol use: No   • Drug use: No       NIH Stroke Scale:        Nursing report ED to floor:       Debra Middleton RN  03/09/22 0238

## 2022-03-09 NOTE — ED NOTES
Patients home health caregiver was giving patient a bowel prep when patient went unresponsive, upon EMS arrival patient appeared post ictal and seized en route to hospital.     Triage staff wearing appropriate PPE.      Stephie Peterson, RN  03/08/22 7750

## 2022-03-10 LAB
ANION GAP SERPL CALCULATED.3IONS-SCNC: 11 MMOL/L (ref 5–15)
BACTERIA BLD CULT: ABNORMAL
BASOPHILS # BLD AUTO: 0.08 10*3/MM3 (ref 0–0.2)
BASOPHILS NFR BLD AUTO: 0.6 % (ref 0–1.5)
BOTTLE TYPE: ABNORMAL
BUN SERPL-MCNC: 7 MG/DL (ref 8–23)
BUN/CREAT SERPL: 8.1 (ref 7–25)
CALCIUM SPEC-SCNC: 8.4 MG/DL (ref 8.6–10.5)
CHLORIDE SERPL-SCNC: 109 MMOL/L (ref 98–107)
CO2 SERPL-SCNC: 22 MMOL/L (ref 22–29)
CREAT SERPL-MCNC: 0.86 MG/DL (ref 0.76–1.27)
D-LACTATE SERPL-SCNC: 1.1 MMOL/L (ref 0.5–2)
DEPRECATED RDW RBC AUTO: 50.6 FL (ref 37–54)
EGFRCR SERPLBLD CKD-EPI 2021: 96.1 ML/MIN/1.73
EOSINOPHIL # BLD AUTO: 0.14 10*3/MM3 (ref 0–0.4)
EOSINOPHIL NFR BLD AUTO: 1.1 % (ref 0.3–6.2)
ERYTHROCYTE [DISTWIDTH] IN BLOOD BY AUTOMATED COUNT: 15.1 % (ref 12.3–15.4)
GLUCOSE SERPL-MCNC: 77 MG/DL (ref 65–99)
HCT VFR BLD AUTO: 39.6 % (ref 37.5–51)
HGB BLD-MCNC: 13.3 G/DL (ref 13–17.7)
IMM GRANULOCYTES # BLD AUTO: 0.03 10*3/MM3 (ref 0–0.05)
IMM GRANULOCYTES NFR BLD AUTO: 0.2 % (ref 0–0.5)
LYMPHOCYTES # BLD AUTO: 1.42 10*3/MM3 (ref 0.7–3.1)
LYMPHOCYTES NFR BLD AUTO: 11.5 % (ref 19.6–45.3)
MCH RBC QN AUTO: 30.9 PG (ref 26.6–33)
MCHC RBC AUTO-ENTMCNC: 33.6 G/DL (ref 31.5–35.7)
MCV RBC AUTO: 92.1 FL (ref 79–97)
MONOCYTES # BLD AUTO: 0.7 10*3/MM3 (ref 0.1–0.9)
MONOCYTES NFR BLD AUTO: 5.7 % (ref 5–12)
NEUTROPHILS NFR BLD AUTO: 80.9 % (ref 42.7–76)
NEUTROPHILS NFR BLD AUTO: 9.98 10*3/MM3 (ref 1.7–7)
NRBC BLD AUTO-RTO: 0 /100 WBC (ref 0–0.2)
PLATELET # BLD AUTO: 209 10*3/MM3 (ref 140–450)
PMV BLD AUTO: 10.8 FL (ref 6–12)
POTASSIUM SERPL-SCNC: 3.1 MMOL/L (ref 3.5–5.2)
RBC # BLD AUTO: 4.3 10*6/MM3 (ref 4.14–5.8)
SODIUM SERPL-SCNC: 142 MMOL/L (ref 136–145)
WBC NRBC COR # BLD: 12.35 10*3/MM3 (ref 3.4–10.8)

## 2022-03-10 PROCEDURE — 80048 BASIC METABOLIC PNL TOTAL CA: CPT | Performed by: INTERNAL MEDICINE

## 2022-03-10 PROCEDURE — 93005 ELECTROCARDIOGRAM TRACING: CPT | Performed by: INTERNAL MEDICINE

## 2022-03-10 PROCEDURE — 92610 EVALUATE SWALLOWING FUNCTION: CPT

## 2022-03-10 PROCEDURE — 25010000002 ENOXAPARIN PER 10 MG: Performed by: INTERNAL MEDICINE

## 2022-03-10 PROCEDURE — 25010000002 PIPERACILLIN SOD-TAZOBACTAM PER 1 G: Performed by: INTERNAL MEDICINE

## 2022-03-10 PROCEDURE — 85025 COMPLETE CBC W/AUTO DIFF WBC: CPT | Performed by: INTERNAL MEDICINE

## 2022-03-10 PROCEDURE — 25010000002 LEVETRIRACETAM PER 10 MG: Performed by: PSYCHIATRY & NEUROLOGY

## 2022-03-10 PROCEDURE — 83605 ASSAY OF LACTIC ACID: CPT | Performed by: INTERNAL MEDICINE

## 2022-03-10 PROCEDURE — 93010 ELECTROCARDIOGRAM REPORT: CPT | Performed by: INTERNAL MEDICINE

## 2022-03-10 PROCEDURE — 99232 SBSQ HOSP IP/OBS MODERATE 35: CPT | Performed by: PSYCHIATRY & NEUROLOGY

## 2022-03-10 RX ORDER — ACETAMINOPHEN 325 MG/1
650 TABLET ORAL EVERY 6 HOURS PRN
Status: DISCONTINUED | OUTPATIENT
Start: 2022-03-10 | End: 2022-03-16 | Stop reason: HOSPADM

## 2022-03-10 RX ADMIN — Medication 10 ML: at 10:00

## 2022-03-10 RX ADMIN — TAZOBACTAM SODIUM AND PIPERACILLIN SODIUM 3.38 G: 375; 3 INJECTION, SOLUTION INTRAVENOUS at 03:06

## 2022-03-10 RX ADMIN — LAMOTRIGINE 100 MG: 100 TABLET ORAL at 21:11

## 2022-03-10 RX ADMIN — CASTOR OIL AND BALSAM, PERU 1 APPLICATION: 788; 87 OINTMENT TOPICAL at 20:51

## 2022-03-10 RX ADMIN — ZINC OXIDE 1 APPLICATION: 200 OINTMENT TOPICAL at 10:00

## 2022-03-10 RX ADMIN — ENOXAPARIN SODIUM 40 MG: 100 INJECTION SUBCUTANEOUS at 10:00

## 2022-03-10 RX ADMIN — SODIUM CHLORIDE 75 ML/HR: 9 INJECTION, SOLUTION INTRAVENOUS at 17:48

## 2022-03-10 RX ADMIN — ZINC OXIDE 1 APPLICATION: 200 OINTMENT TOPICAL at 16:00

## 2022-03-10 RX ADMIN — SODIUM CHLORIDE 75 ML/HR: 9 INJECTION, SOLUTION INTRAVENOUS at 03:40

## 2022-03-10 RX ADMIN — LEVETIRACETAM 1250 MG: 100 INJECTION, SOLUTION, CONCENTRATE INTRAVENOUS at 12:00

## 2022-03-10 RX ADMIN — Medication 10 ML: at 20:51

## 2022-03-10 RX ADMIN — ZINC OXIDE 1 APPLICATION: 200 OINTMENT TOPICAL at 20:51

## 2022-03-10 RX ADMIN — LEVETIRACETAM 1250 MG: 100 INJECTION, SOLUTION, CONCENTRATE INTRAVENOUS at 20:56

## 2022-03-10 RX ADMIN — CASTOR OIL AND BALSAM, PERU 1 APPLICATION: 788; 87 OINTMENT TOPICAL at 10:00

## 2022-03-10 NOTE — PROGRESS NOTES
[facility]      Mar/chart reviewed  Follow up seizure resulting in intubation,   Patient makes eye contact doesn't provide subjective    Vital Sign Min/Max for last 24 hours  Temp  Min: 98.1 °F (36.7 °C)  Max: 98.6 °F (37 °C)   BP  Min: 79/54  Max: 107/68   Pulse  Min: 65  Max: 112   Resp  Min: 16  Max: 23   SpO2  Min: 88 %  Max: 99 %   Flow (L/min)  Min: 3  Max: 4   Weight  Min: 78.7 kg (173 lb 8 oz)  Max: 78.7 kg (173 lb 8 oz)     Appears ill, somewhat alert, doesn't verbalize  Perrl,normal sclera  mmm, no jvd, trachea midline, neck supple,  chest cta bilaterally, no crackles, no wheezes,   rrr,   soft, nt, nd +bs,  no c/c/e  Skin warm, dry no rashes    Labs: 3/10: reviewed:  Glucose 77  Bun 7  Cr 0.86  Na 142  k 3.1  Bicarb 22  Wbc 12  hgb 13.3  plts 209    1/2: blood culture + from 3/9    A/P  1. Seizure -- on keppra and lamictal, follow neurology recommendations  2. Mechanical ventilation -- extubated doing well  3. Lactic acidosis -- 2/2 seizure  4. Electrolyte abnormalities -- correct as needed  5. GERD -- pepcid  6. Hypothyroidism -- continue synthroid  7. Cerebral palsy  8. Positive blood culture -- suspect contaminant    Can transfer out of unit

## 2022-03-10 NOTE — PROGRESS NOTES
"DOS: 3/10/2022  NAME: Ned Basilio   : 1955  PCP: Seth Villanueva MD  Chief Complaint   Patient presents with   • Seizures       Chief complaint: Seizure  Subjective: Extubated yesterday.  No recurrent seizures overnight.  No acute events.    Objective:  Vital signs: /66   Pulse 83   Temp 98.4 °F (36.9 °C) (Oral)   Resp 18   Ht 152.4 cm (60\")   Wt 78.7 kg (173 lb 8 oz)   SpO2 96%   BMI 33.88 kg/m²    Gen: NAD, vitals reviewed  MS: Awake, attends examiner, globally aphasic  CN: visual acuity grossly normal, PERRL, EOMI  Motor: 0/5 throughout, increased tone  Sensory: intact to light touch all 4 ext.    ROS:  Unable to obtain, patient nonverbal      Laboratory results:  Lab Results   Component Value Date    GLUCOSE 77 03/10/2022    CALCIUM 8.4 (L) 03/10/2022     03/10/2022    K 3.1 (L) 03/10/2022    CO2 22.0 03/10/2022     (H) 03/10/2022    BUN 7 (L) 03/10/2022    CREATININE 0.86 03/10/2022    EGFRIFNONA >150 10/19/2021    BCR 8.1 03/10/2022    ANIONGAP 11.0 03/10/2022     Lab Results   Component Value Date    WBC 12.35 (H) 03/10/2022    HGB 13.3 03/10/2022    HCT 39.6 03/10/2022    MCV 92.1 03/10/2022     03/10/2022     No results found for: LDL         Review of labs: WBC 12.3, potassium 3.1.  Lamictal level pending    Diagnoses:  Epilepsy, focal onset with complex partial seizures, with status epilepticus, not intractable  Cerebral palsy, quadriplegic    Comment: Appears to be back to his neurologic baseline.  EEG was normal    Plan:  1.  Continue Lamictal 100 twice daily, follow-up Lamictal level  2.  Increase Keppra slightly to 1250 twice daily  3.  Has been downgraded to the floor    Discussed with Dr. Howard. If patient does well today I think he can probably transfer back to his facility tomorrow    "

## 2022-03-10 NOTE — PROGRESS NOTES
"Adult Nutrition  Assessment/PES    Patient Name:  Ned Basilio  YOB: 1955  MRN: [id number]  Admit Date:  3/8/2022    Assessment Date:  3/10/2022    Comments:  Nutrition assessment complete due to skin issues - pressure injuries.  Admitted for seizure, resp failure- extubated yesterday, hx cerebral palsy.  SLP eval complete - results pending. BMI 33.9. Will follow clinical course, await po, offer supplements if poor po.     Reason for Assessment     Row Name 03/10/22 1115          Reason for Assessment    Reason For Assessment identified at risk by screening criteria     Diagnosis pulmonary disease;neurologic conditions  Resp Failure-extuabted, seizure, GERD, Cerebral palsy, hypothyroid     Identified At Risk by Screening Criteria large or nonhealing wound, burn or pressure injury                Nutrition/Diet History     Row Name 03/10/22 1116          Nutrition/Diet History    Factors Affecting Nutritional Intake cognitive status/motor function                Anthropometrics     Row Name 03/10/22 1119 03/10/22 0600       Anthropometrics    Height 152.4 cm (60\") --    Weight -- 78.7 kg (173 lb 8 oz)    Height for Calculation 1.524 m (5') --    Weight for Calculation 78.7 kg (173 lb 8 oz) --    Additional Documentation Ideal Body Weight (IBW) (Group) --       Ideal Body Weight (IBW)    Ideal Body Weight 100lb IBW,  BMI 33.9 --    Retired Ideal Body Weight (IBW) (kg) 48.54 --               Labs/Tests/Procedures/Meds     Row Name 03/10/22 1116          Labs/Procedures/Meds    Lab Results Reviewed reviewed, pertinent     Lab Results Comments K+            Diagnostic Tests/Procedures    Diagnostic Test/Procedure Reviewed reviewed, pertinent     Diagnostic Test/Procedures Comments SLP eval            Medications    Pertinent Medications Reviewed reviewed, pertinent     Pertinent Medications Comments lovenox, keppra, synthroid, IVF                Physical Findings     Row Name 03/10/22 1117          " "Physical Findings    Overall Physical Appearance Obese, bilat gluteal PI, wounds to R and L foot                Estimated/Assessed Needs - Anthropometrics     Row Name 03/10/22 1119 03/10/22 0600       Anthropometrics    Height 152.4 cm (60\") --    Weight -- 78.7 kg (173 lb 8 oz)    Height for Calculation 1.524 m (5') --    Weight for Calculation 78.7 kg (173 lb 8 oz) --    Additional Documentation Ideal Body Weight (IBW) (Group) --       Ideal Body Weight (IBW)    Ideal Body Weight 100lb IBW,  BMI 33.9 --       Estimated/Assessed Needs    Additional Documentation KCAL/KG (Group);Fluid Requirements (Group);Protein Requirements (Group) --       KCAL/KG    KCAL/KG 20 Kcal/Kg (kcal);25 Kcal/Kg (kcal) --    20 Kcal/Kg (kcal) 1574 --    25 Kcal/Kg (kcal) 1967.5 --       Protein Requirements    Weight Used For Protein Calculations 78.7 kg (173 lb 8 oz) --    Est Protein Requirement Amount (gms/kg) 1.2 gm protein --    Estimated Protein Requirements (gms/day) 94.44 --       Fluid Requirements    Fluid Requirements (mL/day) 1800 --    RDA Method (mL) 1800 --               Nutrition Prescription Ordered     Row Name 03/10/22 1122          Nutrition Prescription PO    Current PO Diet NPO                Evaluation of Received Nutrient/Fluid Intake     Row Name 03/10/22 1122          Fluid Intake Evaluation    Parenteral Fluid (mL) 1800  IVF               Problem/Interventions:   Problem 1     Row Name 03/10/22 1124          Nutrition Diagnoses Problem 1    Problem 1 Increased Nutrient Needs     Macronutrient Kcal;Protein     Etiology (related to) Medical Diagnosis     Skin Pressure injury                  Intervention Goal     Row Name 03/10/22 1124          Intervention Goal    General Maintain nutrition;Disease management/therapy;Reduce/improve symptoms;Improved nutrition related lab(s);Meet nutritional needs for age/condition     PO Tolerate PO;Initiate feeding     Weight No significant weight loss                Nutrition " Intervention     Row Name 03/10/22 1124          Nutrition Intervention    RD/Tech Action Await begin PO;Follow Tx progress;Care plan reviewd                  Education/Evaluation     Row Name 03/10/22 1124          Education    Education Will Instruct as appropriate            Monitor/Evaluation    Monitor Per protocol;Skin status;Symptoms;I&O;PO intake;Pertinent labs;Weight                 Electronically signed by:  Tennille Castillo RD  03/10/22 11:25 EST

## 2022-03-10 NOTE — PLAN OF CARE
Goal Outcome Evaluation:    Outcome Evaluation: Clinical swallowing evaluation completed during trials of ice chips, thin liquids by straw, nectar thick liquids by straw, and puree. Pt exhibited impaired labial closure around straw but able to acheive adequate closure/suction with tongue and upper lip to drink thin and nectar thick liquids by straw, however, did appear uncoordinated. Suspect premature spillage and delayed swallow initiation. Pt's oropharyngeal swallow appeared functional for puree trials. Inconsistently during trials of thin via straw and nectar thick liquids via straw, pt exhibited large inhale or appeared to be holding breath for brief period after trials. Appearing as attempting to cough but unable. Limited ability to assess due to pt exhibiting no vocalizations. Discussed evaluation results with director at [facility] ([place]) and this appears to be baseline per discussion, however, decided to proceed with VFSS to further assess. Recommend baseline diet of puree diet and thin liquids at this time until VFSS completed. Meds whole in puree. Upright posture. Small bites/drink by straw.

## 2022-03-10 NOTE — THERAPY EVALUATION
Acute Care - Speech Language Pathology   Swallow Initial Evaluation [facility]     Patient Name: Ned Basilio  : 1955  MRN: [id number]  Today's Date: 3/10/2022               Admit Date: 3/8/2022    Visit Dx:     ICD-10-CM ICD-9-CM   1. Acute respiratory failure, unspecified whether with hypoxia or hypercapnia (HCC)  J96.00 518.81   2. Seizure (HCC)  R56.9 780.39   3. Lactic acidosis  E87.2 276.2     Patient Active Problem List   Diagnosis   • History of colon polyps   • Colon cancer screening   • Complicated UTI (urinary tract infection)   • Coronary artery disease   • Fever   • HCAP (healthcare-associated pneumonia)   • HLD (hyperlipidemia)   • Hypothyroidism   • Septic shock (HCC)   • Tracheal compression   • Closed displaced oblique fracture of shaft of right femur (HCC)   • Cerebral palsy (HCC)   • Parkinson disease (HCC)   • Osteoporosis   • Chronic respiratory failure with hypoxia (HCC)   • Chronic constipation   • Seizure disorder (HCC)   • ABLA (acute blood loss anemia)   • Sommers's esophagus   • Acute respiratory failure, unspecified whether with hypoxia or hypercapnia (HCC)     Past Medical History:   Diagnosis Date   • Anxiety    • Arthritis    • Sommers's esophagus    • Cardiomegaly    • Cerebral palsy (HCC)    • Constipation    • Disease of thyroid gland    • GERD (gastroesophageal reflux disease)    • Hyperlipidemia    • Osteoporosis    • Parkinson disease (HCC)    • Pre-operative cardiovascular examination    • Raynaud's disease    • Right hemiplegia (HCC)    • Seizures (HCC)      Past Surgical History:   Procedure Laterality Date   • FEMUR IM NAILING/RODDING Right 10/11/2021    Procedure: Right Femur Intramedullary Rodding;  Surgeon: Josse Carrillo MD;  Location: [facility];  Service: Orthopedics;  Laterality: Right;       SLP Recommendation and Plan  SLP Swallowing Diagnosis: mild-moderate, suspected pharyngeal dysphagia, oral dysphagia (03/10/22 1000)  SLP Diet Recommendation:  puree, thin liquids (03/10/22 1000)  Recommended Precautions and Strategies: upright posture during/after eating, small bites of food and sips of liquid (03/10/22 1000)  SLP Rec. for Method of Medication Administration: meds whole, with pudding or applesauce (03/10/22 1000)     Monitor for Signs of Aspiration: notify SLP if any concerns, yes (03/10/22 1000)  Recommended Diagnostics: VFSS ([facility]) (03/10/22 1000)  Swallow Criteria for Skilled Therapeutic Interventions Met: demonstrates skilled criteria (03/10/22 1000)  Anticipated Discharge Disposition (SLP): group home (03/10/22 1000)  Rehab Potential/Prognosis, Swallowing: good, to achieve stated therapy goals (03/10/22 1000)  Therapy Frequency (Swallow): PRN (03/10/22 1000)  Predicted Duration Therapy Intervention (Days): until discharge (03/10/22 1000)                                  Outcome Evaluation: Clinical swallowing evaluation completed during trials of ice chips, thin liquids by straw, nectar thick liquids by straw, and puree. Pt exhibited impaired labial closure around straw but able to acheive adequate closure/suction with tongue and upper lip to drink thin and nectar thick liquids by straw, however, did appear uncoordinated. Suspect premature spillage and delayed swallow initiation. Pt's oropharyngeal swallow appeared functional for puree trials. Inconsistently during trials of thin via straw and nectar thick liquids via straw, pt exhibited large inhale or appeared to be holding breath for brief period after trials. Appearing as attempting to cough but unable. Limited ability to assess due to pt exhibiting no vocalizations. Discussed evaluation results with director at [facility] ([place]) and this appears to be baseline per discussion, however, decided to proceed with VFSS to further assess. Recommend baseline diet of puree diet and thin liquids at this time until VFSS completed. Meds whole in puree. Upright posture. Small bites/drink by  straw.      SWALLOW EVALUATION (last 72 hours)     SLP Adult Swallow Evaluation     Row Name 03/10/22 1000          Document Type evaluation  -ML    Subjective Information no complaints  -ML    Patient Observations alert;cooperative  -ML    Patient/Family/Caregiver Comments/Observations Pt with no verbalization or vocalization during evaluation. Noted to smile/laugh 2x.  -ML    Patient Effort good  -ML    Symptoms Noted During/After Treatment none  -ML               Patient Profile Reviewed yes  -ML    Pertinent History Of Current Problem s/p seizure and intubation for 1 day. Pt with hx of cerebral palsy and lives in group home. Called and spoke to director who states pt's baseline diet is puree and thin liquids with 1:1 assist.  -ML    Current Method of Nutrition NPO  -ML    Prior Level of Function-Communication cognitive-linguistic impairment  -ML    Prior Level of Function-Swallowing puree;thin liquids  last VFSS 2020  -ML    Plans/Goals Discussed with patient;agreed upon  director of [facility]  -ML    Barriers to Rehab cognitive status  -ML    Patient's Goals for Discharge patient could not state  -ML               Additional Documentation --  Pt appeared in no pain.  -ML               Dentition Assessment natural, present and adequate;teeth are in poor condition  -ML    Secretion Management --  few sticky secretions in oral cavity  -ML               Oral Motor General Assessment unable to assess  -ML    Oral Motor, Comment Pt did not follow any directions  -ML               Clinical Swallow Evaluation Summary Clinical swallowing evaluation completed during trials of ice chips, thin liquids by straw, nectar thick liquids by straw, and puree. Pt exhibited impaired labial closure around straw but able to acheive adequate closure/suction with tongue and upper lip to drink thin and nectar thick liquids by straw, however, did appear uncoordinated. Suspect premature spillage and delayed swallow initiation. Pt's  oropharyngeal swallow appeared functional for puree trials. Inconsistently during trials of thin via straw and nectar thick liquids via straw, pt exhibited large inhale or appeared to be holding breath for brief period after trials. Appearing as attempting to cough but unable. Limited ability to assess due to pt exhibiting no vocalizations. Discussed evaluation results with director at long-term ([place]) and this appears to be baseline per discussion, however, decided to proceed with VFSS to further assess. Recommend baseline diet of puree diet and thin liquids at this time until VFSS completed. Meds whole in puree. Upright posture. Small bites/drink by straw.  -ML               SLP Swallowing Diagnosis mild-moderate;suspected pharyngeal dysphagia;oral dysphagia  -ML    Functional Impact risk of aspiration/pneumonia  -ML    Rehab Potential/Prognosis, Swallowing good, to achieve stated therapy goals  -ML    Swallow Criteria for Skilled Therapeutic Interventions Met demonstrates skilled criteria  -ML               Therapy Frequency (Swallow) PRN  -ML    Predicted Duration Therapy Intervention (Days) until discharge  -ML    SLP Diet Recommendation puree;thin liquids  -ML    Recommended Diagnostics VFSS ([facility])  -ML    Recommended Precautions and Strategies upright posture during/after eating;small bites of food and sips of liquid  -ML    Oral Care Recommendations Oral Care BID/PRN  -ML    SLP Rec. for Method of Medication Administration meds whole;with pudding or applesauce  -ML    Monitor for Signs of Aspiration notify SLP if any concerns;yes  -ML    Anticipated Discharge Disposition (SLP) group home  -ML               Oral Nutrition/Hydration Goal Selection (SLP) --  goals to be established after VFSS  -ML          User Key  (r) = Recorded By, (t) = Taken By, (c) = Cosigned By    Initials Name Effective Dates    ML Randi Wylie MS CCC-SLP 06/16/21 -                 EDUCATION  The patient has been educated in the  following areas:   Dysphagia (Swallowing Impairment).         SLP Outcome Measures (last 72 hours)     SLP Outcome Measures     Row Name 03/10/22 1300             SLP Outcome Measures    Outcome Measure Used? Adult NOMS  -ML              Adult FCM Scores    FCM Chosen Swallowing  -ML      Swallowing FCM Score 4  -ML            User Key  (r) = Recorded By, (t) = Taken By, (c) = Cosigned By    Initials Name Effective Dates    Randi Whitaker MS CCC-SLP 06/16/21 -                  Time Calculation:    Time Calculation- SLP     Row Name 03/10/22 1321             Time Calculation- SLP    SLP Start Time 1000  -ML      SLP Received On 03/10/22  -ML              Untimed Charges    SLP Eval/Re-eval  ST Eval Oral Pharyng Swallow - [id number]  -ML      [id number] Eval Oral Pharyng Swallow Minutes 60  -ML              Total Minutes    Untimed Charges Total Minutes 60  -ML       Total Minutes 60  -ML            User Key  (r) = Recorded By, (t) = Taken By, (c) = Cosigned By    Initials Name Provider Type    Randi Whitaker MS CCC-SLP Speech and Language Pathologist                Therapy Charges for Today     Code Description Service Date Service Provider Modifiers Qty    [id number] HC ST EVAL ORAL PHARYNG SWALLOW 4 3/10/2022 Randi Wylie MS CCC-SLP GN 1               Randi Wylie MS CCC-SLP  3/10/2022

## 2022-03-11 ENCOUNTER — APPOINTMENT (OUTPATIENT)
Dept: GENERAL RADIOLOGY | Facility: HOSPITAL | Age: 67
End: 2022-03-11

## 2022-03-11 LAB
ANION GAP SERPL CALCULATED.3IONS-SCNC: 14 MMOL/L (ref 5–15)
BACTERIA SPEC AEROBE CULT: ABNORMAL
BACTERIA SPEC RESP CULT: NORMAL
BASOPHILS # BLD AUTO: 0.07 10*3/MM3 (ref 0–0.2)
BASOPHILS NFR BLD AUTO: 0.6 % (ref 0–1.5)
BUN SERPL-MCNC: 7 MG/DL (ref 8–23)
BUN/CREAT SERPL: 11.7 (ref 7–25)
CALCIUM SPEC-SCNC: 8.8 MG/DL (ref 8.6–10.5)
CHLORIDE SERPL-SCNC: 110 MMOL/L (ref 98–107)
CO2 SERPL-SCNC: 20 MMOL/L (ref 22–29)
CREAT SERPL-MCNC: 0.6 MG/DL (ref 0.76–1.27)
DEPRECATED RDW RBC AUTO: 51.7 FL (ref 37–54)
EGFRCR SERPLBLD CKD-EPI 2021: 107.1 ML/MIN/1.73
EOSINOPHIL # BLD AUTO: 0.12 10*3/MM3 (ref 0–0.4)
EOSINOPHIL NFR BLD AUTO: 1 % (ref 0.3–6.2)
ERYTHROCYTE [DISTWIDTH] IN BLOOD BY AUTOMATED COUNT: 15.3 % (ref 12.3–15.4)
GLUCOSE BLDC GLUCOMTR-MCNC: 159 MG/DL (ref 70–130)
GLUCOSE BLDC GLUCOMTR-MCNC: 169 MG/DL (ref 70–130)
GLUCOSE BLDC GLUCOMTR-MCNC: 68 MG/DL (ref 70–130)
GLUCOSE SERPL-MCNC: 59 MG/DL (ref 65–99)
GRAM STN SPEC: ABNORMAL
GRAM STN SPEC: ABNORMAL
GRAM STN SPEC: NORMAL
HCT VFR BLD AUTO: 37 % (ref 37.5–51)
HGB BLD-MCNC: 12.3 G/DL (ref 13–17.7)
IMM GRANULOCYTES # BLD AUTO: 0.05 10*3/MM3 (ref 0–0.05)
IMM GRANULOCYTES NFR BLD AUTO: 0.4 % (ref 0–0.5)
ISOLATED FROM: ABNORMAL
LAMOTRIGINE SERPL-MCNC: 6.7 UG/ML (ref 2–20)
LYMPHOCYTES # BLD AUTO: 1.23 10*3/MM3 (ref 0.7–3.1)
LYMPHOCYTES NFR BLD AUTO: 10.6 % (ref 19.6–45.3)
MAGNESIUM SERPL-MCNC: 1.9 MG/DL (ref 1.6–2.4)
MCH RBC QN AUTO: 30.7 PG (ref 26.6–33)
MCHC RBC AUTO-ENTMCNC: 33.2 G/DL (ref 31.5–35.7)
MCV RBC AUTO: 92.3 FL (ref 79–97)
MONOCYTES # BLD AUTO: 0.71 10*3/MM3 (ref 0.1–0.9)
MONOCYTES NFR BLD AUTO: 6.1 % (ref 5–12)
NEUTROPHILS NFR BLD AUTO: 81.3 % (ref 42.7–76)
NEUTROPHILS NFR BLD AUTO: 9.43 10*3/MM3 (ref 1.7–7)
NRBC BLD AUTO-RTO: 0 /100 WBC (ref 0–0.2)
PLATELET # BLD AUTO: 203 10*3/MM3 (ref 140–450)
PMV BLD AUTO: 10.6 FL (ref 6–12)
POTASSIUM SERPL-SCNC: 2.9 MMOL/L (ref 3.5–5.2)
POTASSIUM SERPL-SCNC: 3 MMOL/L (ref 3.5–5.2)
QT INTERVAL: 348 MS
RBC # BLD AUTO: 4.01 10*6/MM3 (ref 4.14–5.8)
SODIUM SERPL-SCNC: 144 MMOL/L (ref 136–145)
WBC NRBC COR # BLD: 11.61 10*3/MM3 (ref 3.4–10.8)

## 2022-03-11 PROCEDURE — 87040 BLOOD CULTURE FOR BACTERIA: CPT | Performed by: INTERNAL MEDICINE

## 2022-03-11 PROCEDURE — 99231 SBSQ HOSP IP/OBS SF/LOW 25: CPT | Performed by: PSYCHIATRY & NEUROLOGY

## 2022-03-11 PROCEDURE — 80048 BASIC METABOLIC PNL TOTAL CA: CPT | Performed by: INTERNAL MEDICINE

## 2022-03-11 PROCEDURE — 85025 COMPLETE CBC W/AUTO DIFF WBC: CPT | Performed by: INTERNAL MEDICINE

## 2022-03-11 PROCEDURE — 25010000002 LEVETRIRACETAM PER 10 MG: Performed by: PSYCHIATRY & NEUROLOGY

## 2022-03-11 PROCEDURE — 92611 MOTION FLUOROSCOPY/SWALLOW: CPT

## 2022-03-11 PROCEDURE — 0 POTASSIUM CHLORIDE 10 MEQ/100ML SOLUTION: Performed by: INTERNAL MEDICINE

## 2022-03-11 PROCEDURE — 82962 GLUCOSE BLOOD TEST: CPT

## 2022-03-11 PROCEDURE — 83735 ASSAY OF MAGNESIUM: CPT | Performed by: INTERNAL MEDICINE

## 2022-03-11 PROCEDURE — 25010000002 ENOXAPARIN PER 10 MG: Performed by: INTERNAL MEDICINE

## 2022-03-11 PROCEDURE — 74230 X-RAY XM SWLNG FUNCJ C+: CPT

## 2022-03-11 PROCEDURE — 84132 ASSAY OF SERUM POTASSIUM: CPT | Performed by: INTERNAL MEDICINE

## 2022-03-11 RX ORDER — DEXTROSE AND SODIUM CHLORIDE 5; .45 G/100ML; G/100ML
75 INJECTION, SOLUTION INTRAVENOUS CONTINUOUS
Status: DISCONTINUED | OUTPATIENT
Start: 2022-03-11 | End: 2022-03-11

## 2022-03-11 RX ORDER — DEXTROSE MONOHYDRATE 25 G/50ML
INJECTION, SOLUTION INTRAVENOUS
Status: COMPLETED
Start: 2022-03-11 | End: 2022-03-11

## 2022-03-11 RX ORDER — DEXTROSE MONOHYDRATE 25 G/50ML
25 INJECTION, SOLUTION INTRAVENOUS
Status: DISCONTINUED | OUTPATIENT
Start: 2022-03-11 | End: 2022-03-16 | Stop reason: HOSPADM

## 2022-03-11 RX ORDER — NICOTINE POLACRILEX 4 MG
15 LOZENGE BUCCAL
Status: DISCONTINUED | OUTPATIENT
Start: 2022-03-11 | End: 2022-03-16 | Stop reason: HOSPADM

## 2022-03-11 RX ORDER — DEXTROSE, SODIUM CHLORIDE, AND POTASSIUM CHLORIDE 5; .45; .15 G/100ML; G/100ML; G/100ML
75 INJECTION INTRAVENOUS CONTINUOUS
Status: DISCONTINUED | OUTPATIENT
Start: 2022-03-11 | End: 2022-03-16 | Stop reason: HOSPADM

## 2022-03-11 RX ORDER — PANTOPRAZOLE SODIUM 40 MG/10ML
40 INJECTION, POWDER, LYOPHILIZED, FOR SOLUTION INTRAVENOUS EVERY 24 HOURS
Status: DISCONTINUED | OUTPATIENT
Start: 2022-03-11 | End: 2022-03-16 | Stop reason: HOSPADM

## 2022-03-11 RX ADMIN — Medication 10 ML: at 08:00

## 2022-03-11 RX ADMIN — ZINC OXIDE 1 APPLICATION: 200 OINTMENT TOPICAL at 16:07

## 2022-03-11 RX ADMIN — LEVETIRACETAM 1250 MG: 100 INJECTION, SOLUTION, CONCENTRATE INTRAVENOUS at 22:44

## 2022-03-11 RX ADMIN — ZINC OXIDE 1 APPLICATION: 200 OINTMENT TOPICAL at 08:01

## 2022-03-11 RX ADMIN — BARIUM SULFATE 55 ML: 0.81 POWDER, FOR SUSPENSION ORAL at 11:49

## 2022-03-11 RX ADMIN — CASTOR OIL AND BALSAM, PERU 1 APPLICATION: 788; 87 OINTMENT TOPICAL at 08:00

## 2022-03-11 RX ADMIN — ENOXAPARIN SODIUM 40 MG: 100 INJECTION SUBCUTANEOUS at 08:00

## 2022-03-11 RX ADMIN — DEXTROSE MONOHYDRATE 50 ML: 25 INJECTION, SOLUTION INTRAVENOUS at 04:47

## 2022-03-11 RX ADMIN — POTASSIUM CHLORIDE 10 MEQ: 7.46 INJECTION, SOLUTION INTRAVENOUS at 04:51

## 2022-03-11 RX ADMIN — SODIUM CHLORIDE 75 ML/HR: 9 INJECTION, SOLUTION INTRAVENOUS at 08:00

## 2022-03-11 RX ADMIN — POTASSIUM CHLORIDE 10 MEQ: 7.46 INJECTION, SOLUTION INTRAVENOUS at 07:28

## 2022-03-11 RX ADMIN — PANTOPRAZOLE SODIUM 40 MG: 40 INJECTION, POWDER, LYOPHILIZED, FOR SOLUTION INTRAVENOUS at 10:34

## 2022-03-11 RX ADMIN — POTASSIUM CHLORIDE 10 MEQ: 7.46 INJECTION, SOLUTION INTRAVENOUS at 10:34

## 2022-03-11 RX ADMIN — LEVETIRACETAM 1250 MG: 100 INJECTION, SOLUTION, CONCENTRATE INTRAVENOUS at 08:00

## 2022-03-11 RX ADMIN — POTASSIUM CHLORIDE, DEXTROSE MONOHYDRATE AND SODIUM CHLORIDE 75 ML/HR: 150; 5; 450 INJECTION, SOLUTION INTRAVENOUS at 17:08

## 2022-03-11 RX ADMIN — CASTOR OIL AND BALSAM, PERU 1 APPLICATION: 788; 87 OINTMENT TOPICAL at 20:04

## 2022-03-11 RX ADMIN — LAMOTRIGINE 100 MG: 100 TABLET ORAL at 22:21

## 2022-03-11 RX ADMIN — POTASSIUM CHLORIDE, DEXTROSE MONOHYDRATE AND SODIUM CHLORIDE 75 ML/HR: 150; 5; 450 INJECTION, SOLUTION INTRAVENOUS at 10:34

## 2022-03-11 RX ADMIN — Medication 10 ML: at 20:13

## 2022-03-11 RX ADMIN — BARIUM SULFATE 4 ML: 980 POWDER, FOR SUSPENSION ORAL at 11:49

## 2022-03-11 RX ADMIN — POTASSIUM CHLORIDE 10 MEQ: 7.46 INJECTION, SOLUTION INTRAVENOUS at 06:24

## 2022-03-11 RX ADMIN — DEXTROSE MONOHYDRATE 25 G: 25 INJECTION, SOLUTION INTRAVENOUS at 12:25

## 2022-03-11 RX ADMIN — ZINC OXIDE 1 APPLICATION: 200 OINTMENT TOPICAL at 20:04

## 2022-03-11 RX ADMIN — POTASSIUM CHLORIDE 10 MEQ: 7.46 INJECTION, SOLUTION INTRAVENOUS at 09:27

## 2022-03-11 RX ADMIN — POTASSIUM CHLORIDE 10 MEQ: 7.46 INJECTION, SOLUTION INTRAVENOUS at 12:10

## 2022-03-11 NOTE — MBS/VFSS/FEES
Acute Care - Speech Language Pathology   Swallow Initial Evaluation [facility]     Patient Name: Ned Basilio  : 1955  MRN: [id number]  Today's Date: 3/11/2022               Admit Date: 3/8/2022    Visit Dx:     ICD-10-CM ICD-9-CM   1. Acute respiratory failure, unspecified whether with hypoxia or hypercapnia (HCC)  J96.00 518.81   2. Seizure (HCC)  R56.9 780.39   3. Lactic acidosis  E87.2 276.2     Patient Active Problem List   Diagnosis   • History of colon polyps   • Colon cancer screening   • Complicated UTI (urinary tract infection)   • Coronary artery disease   • Fever   • HCAP (healthcare-associated pneumonia)   • HLD (hyperlipidemia)   • Hypothyroidism   • Septic shock (HCC)   • Tracheal compression   • Closed displaced oblique fracture of shaft of right femur (HCC)   • Cerebral palsy (HCC)   • Parkinson disease (HCC)   • Osteoporosis   • Chronic respiratory failure with hypoxia (HCC)   • Chronic constipation   • Seizure disorder (HCC)   • ABLA (acute blood loss anemia)   • Sommers's esophagus   • Acute respiratory failure, unspecified whether with hypoxia or hypercapnia (HCC)     Past Medical History:   Diagnosis Date   • Anxiety    • Arthritis    • Sommers's esophagus    • Cardiomegaly    • Cerebral palsy (HCC)    • Constipation    • Disease of thyroid gland    • GERD (gastroesophageal reflux disease)    • Hyperlipidemia    • Osteoporosis    • Parkinson disease (HCC)    • Pre-operative cardiovascular examination    • Raynaud's disease    • Right hemiplegia (HCC)    • Seizures (HCC)      Past Surgical History:   Procedure Laterality Date   • FEMUR IM NAILING/RODDING Right 10/11/2021    Procedure: Right Femur Intramedullary Rodding;  Surgeon: Josse Carrillo MD;  Location: [facility];  Service: Orthopedics;  Laterality: Right;       SLP Recommendation and Plan  SLP Swallowing Diagnosis: moderate, oral dysphagia, mild, pharyngeal dysphagia (22 1200)  SLP Diet Recommendation: kalen  thin liquids (03/11/22 1200)  Recommended Precautions and Strategies: upright posture during/after eating, assist with feeding, small bites of food and sips of liquid (03/11/22 1200)  SLP Rec. for Method of Medication Administration: meds whole, meds crushed, with pudding or applesauce (03/11/22 1200)     Monitor for Signs of Aspiration: yes, notify SLP if any concerns (03/11/22 1200)     Swallow Criteria for Skilled Therapeutic Interventions Met: baseline status (03/11/22 1200)  Anticipated Discharge Disposition (SLP): group home (03/11/22 1200)     Therapy Frequency (Swallow): PRN (03/11/22 1200)  Predicted Duration Therapy Intervention (Days): until discharge (03/11/22 1200)                                  Outcome Evaluation: VFSS completed- see report for details. Suspect patient's swallow is at baseline. Recommend pureed diet/thin liquids (which is his baseline diet). Meds whole or crushed PRN w/puree. 1:1 assist with PO, small bites and drinks via straw. Upright for any PO intake. SLP to follow for diet tolerance x1 and s/o if no concerns.      SWALLOW EVALUATION (last 72 hours)     SLP Adult Swallow Evaluation     Row Name 03/11/22 1200 03/10/22 1000                Rehab Evaluation    Document Type evaluation  -CP evaluation  -ML       Subjective Information no complaints  -CP no complaints  -ML       Patient Observations alert;cooperative  -CP alert;cooperative  -ML       Patient/Family/Caregiver Comments/Observations nonverbal  -CP Pt with no verbalization or vocalization during evaluation. Noted to smile/laugh 2x.  -ML       Patient Effort good  -CP good  -ML       Symptoms Noted During/After Treatment none  -CP none  -ML                General Information    Patient Profile Reviewed yes  -CP yes  -ML       Pertinent History Of Current Problem -- s/p seizure and intubation for 1 day. Pt with hx of cerebral palsy and lives in group home. Called and spoke to director who states pt's baseline diet is puree  and thin liquids with 1:1 assist.  -ML       Current Method of Nutrition NPO  -CP NPO  -ML       Prior Level of Function-Communication -- cognitive-linguistic impairment  -ML       Prior Level of Function-Swallowing puree;thin liquids  -CP puree;thin liquids  last VFSS 2020  -ML       Plans/Goals Discussed with patient  -CP patient;agreed upon  director of [facility]  -       Barriers to Rehab cognitive status  -CP cognitive status  -ML       Patient's Goals for Discharge patient could not state  -CP patient could not state  -ML                Pain    Additional Documentation -- --  Pt appeared in no pain.  -ML                Oral Motor Structure and Function    Dentition Assessment -- natural, present and adequate;teeth are in poor condition  -ML       Secretion Management -- --  few sticky secretions in oral cavity  -ML                Oral Musculature and Cranial Nerve Assessment    Oral Motor General Assessment -- unable to assess  -ML       Oral Motor, Comment -- Pt did not follow any directions  -ML                Clinical Swallow Eval    Clinical Swallow Evaluation Summary -- Clinical swallowing evaluation completed during trials of ice chips, thin liquids by straw, nectar thick liquids by straw, and puree. Pt exhibited impaired labial closure around straw but able to acheive adequate closure/suction with tongue and upper lip to drink thin and nectar thick liquids by straw, however, did appear uncoordinated. Suspect premature spillage and delayed swallow initiation. Pt's oropharyngeal swallow appeared functional for puree trials. Inconsistently during trials of thin via straw and nectar thick liquids via straw, pt exhibited large inhale or appeared to be holding breath for brief period after trials. Appearing as attempting to cough but unable. Limited ability to assess due to pt exhibiting no vocalizations. Discussed evaluation results with director at [facility] ([place]) and this appears to be baseline per  discussion, however, decided to proceed with VFSS to further assess. Recommend baseline diet of puree diet and thin liquids at this time until VFSS completed. Meds whole in puree. Upright posture. Small bites/drink by straw.  -ML                MBS/VFSS    Utensils Used straw;spoon  -CP --       Consistencies Trialed thin liquids;pureed  -CP --                MBS/VFSS Interpretation    VFSS Summary The patient presents with moderate oral and minimal pharyngeal dysphagia. Reduced lingual bolus control with thin liquids via straw, with premature spillage to the pyriform sinuses, but no penetration or aspiration. The patient was only able to draw in small boluses via straw, with mild diffuse oral residue present. Reduced A-P transit with puree, with mild oral residue post-swallow. A second swallow reduced material. Did not assess any solids, as patient's baseline diet is puree.  -CP --                SLP Communication to Radiology    Summary Statement The patient presents with moderate oral and minimal pharyngeal dysphagia. Reduced lingual bolus control with thin liquids via straw, with premature spillage to the pyriform sinuses, but no penetration or aspiration. The patient was only able to draw in small boluses via straw, with mild diffuse oral residue present. Reduced A-P transit with puree, with mild oral residue post-swallow. A second swallow reduced material. Did not assess any solids, as patient's baseline diet is puree.  -CP --                SLP Evaluation Clinical Impression    SLP Swallowing Diagnosis moderate;oral dysphagia;mild;pharyngeal dysphagia  -CP mild-moderate;suspected pharyngeal dysphagia;oral dysphagia  -ML       Functional Impact risk of aspiration/pneumonia  -CP risk of aspiration/pneumonia  -ML       Rehab Potential/Prognosis, Swallowing -- good, to achieve stated therapy goals  -ML       Swallow Criteria for Skilled Therapeutic Interventions Met baseline status  -CP demonstrates skilled  criteria  -ML                Recommendations    Therapy Frequency (Swallow) PRN  -CP PRN  -ML       Predicted Duration Therapy Intervention (Days) until discharge  -CP until discharge  -ML       SLP Diet Recommendation puree;thin liquids  -CP puree;thin liquids  -ML       Recommended Diagnostics -- VFSS (MBS)  -ML       Recommended Precautions and Strategies upright posture during/after eating;assist with feeding;small bites of food and sips of liquid  -CP upright posture during/after eating;small bites of food and sips of liquid  -ML       Oral Care Recommendations Oral Care BID/PRN  -CP Oral Care BID/PRN  -ML       SLP Rec. for Method of Medication Administration meds whole;meds crushed;with pudding or applesauce  -CP meds whole;with pudding or applesauce  -ML       Monitor for Signs of Aspiration yes;notify SLP if any concerns  -CP notify SLP if any concerns;yes  -ML       Anticipated Discharge Disposition (SLP) group home  -CP group home  -ML                Swallow Goals (SLP)    Oral Nutrition/Hydration Goal Selection (SLP) -- --  goals to be established after VFSS  -ML             User Key  (r) = Recorded By, (t) = Taken By, (c) = Cosigned By    Initials Name Effective Dates    She Faulkner MS CCC-SLP 06/16/21 -     ML Randi Wylie MS CCC-SLP 06/16/21 -                 EDUCATION  The patient has been educated in the following areas:   Dysphagia (Swallowing Impairment) Oral Care/Hydration Modified Diet Instruction.         SLP Outcome Measures (last 72 hours)     SLP Outcome Measures     Row Name 03/11/22 1300 03/10/22 1300          SLP Outcome Measures    Outcome Measure Used? Adult NOMS  -CP Adult NOMS  -ML            Adult FCM Scores    FCM Chosen Swallowing  -CP Swallowing  -ML     Swallowing FCM Score 4  -CP 4  -ML           User Key  (r) = Recorded By, (t) = Taken By, (c) = Cosigned By    Initials Name Effective Dates    She Faulkner MS CCC-SLP 06/16/21 -     ML Dejan  MS DEANN Bryan-SLP 06/16/21 -                  Time Calculation:    Time Calculation- SLP     Row Name 03/11/22 1312             Time Calculation- SLP    SLP Start Time 1200  -CP      SLP Received On 03/11/22  -CP              Untimed Charges    SLP Eval/Re-eval  [facility] Fluoro Eval Swallow - 88312  -CP      94267-HB [facility] Fluoro Eval Swallow Minutes 60  -CP              Total Minutes    Untimed Charges Total Minutes 60  -CP       Total Minutes 60  -CP            User Key  (r) = Recorded By, (t) = Taken By, (c) = Cosigned By    Initials Name Provider Type    CP She Melvin MS CCC-SLP Speech and Language Pathologist                Therapy Charges for Today     Code Description Service Date Service Provider Modifiers Qty    86992839405 HC [facility] MOTION FLUORO EVAL SWALLOW 4 3/11/2022 She Melvin MS CCC-SLP GN 1        Patient was not wearing a face mask during this therapy encounter. Therapist used appropriate personal protective equipment including mask, eye protection and gloves.  Mask used was standard procedure mask. Appropriate PPE was worn during the entire therapy session. Hand hygiene was completed before and after therapy session. Patient is not in enhanced droplet precautions.            She Melvin MS CCC-SLP  3/11/2022

## 2022-03-11 NOTE — PROGRESS NOTES
"DOS: 3/11/2022  NAME: Ned Basilio   : 1955  PCP: Seth Villanueva MD  Chief Complaint   Patient presents with   • Seizures       Chief complaint: Seizure  Subjective: No recurrent seizures    Objective:  Vital signs: /98 (BP Location: Right arm, Patient Position: Lying)   Pulse 110   Temp 98.8 °F (37.1 °C) (Oral)   Resp 23   Ht 152.4 cm (60\")   Wt 78.9 kg (173 lb 15.1 oz)   SpO2 95%   BMI 33.97 kg/m²    Gen: NAD, vitals reviewed  MS: Awake, alert, aphasic    Diagnoses:  Quadriplegic cerebral palsy  Epilepsy, focal onset, with complex partial seizures, not intractable, with status epilepticus    Comment: Remains stable, no additional seizures    Plan:  Keppra increased to 1250 twice daily  Continue Lamictal 100 twice daily  Follow-up Lamictal level    I would recommend continuing the above medications.  He is okay to transfer back to his facility from a neurology standpoint.  We will follow-up his Lamictal level to determine need for further adjustments.    Addendum: Lamictal level therapeutic. I will leave this at 100 bid and keep the Keppra at 1250 bid. No further adjustments planned. We will sign off and see him on an as needed basis.    "

## 2022-03-11 NOTE — PROGRESS NOTES
[facility]      Mar/chart reviewed  Follow up seizure resulting in intubation,   Patient makes eye contact doesn't provide subjective    Vital Sign Min/Max for last 24 hours  Temp  Min: 98.5 °F (36.9 °C)  Max: 99 °F (37.2 °C)   BP  Min: 102/68  Max: 131/75   Pulse  Min: 89  Max: 126   Resp  Min: 19  Max: 24   SpO2  Min: 85 %  Max: 100 %   Flow (L/min)  Min: 2  Max: 3   Weight  Min: 78.9 kg (173 lb 15.1 oz)  Max: 78.9 kg (173 lb 15.1 oz)   1910/350    Appears ill, somewhat alert, doesn't verbalize  Perrl,normal sclera  mmm, no jvd, trachea midline, neck supple,  chest cta bilaterally, no crackles, no wheezes,   rrr,   soft, nt, nd +bs,  no c/c/e  Skin warm, dry no rashes    Labs: 3/11: reviewed:  Glucose 59  Bun 7  Cr 0.6  Na 144  k 2.9  Bicarb 20  Wbc 11.6  hgb 12.3  plts 203    A/P  1. Seizure -- on keppra and lamictal, follow neurology recommendations  2. Mechanical ventilation -- extubated doing well  3. Lactic acidosis -- 2/2 seizure  4. Electrolyte abnormalities -- correct as needed  5. GERD -- pepcid  6. Hypothyroidism -- continue synthroid  7. Cerebral palsy  8. Positive blood culture -- suspect contaminant, will repeat cultures  9. Dysphagia -- video swallow study, will switch to d51/2ns now    Probably can d/c soon once swallow issue done and neurology recommendations finalized.

## 2022-03-11 NOTE — PLAN OF CARE
Goal Outcome Evaluation:  Plan of Care Reviewed With: patient        Progress: no change  Outcome Evaluation: Pt transferred from icu, awake, eyes opens spontaneously but nonverbal, keeps mouth close, unable to get oral temps or food, did take few sips of tea with no coughig, turned to left side with 2 assist, complete care, foan dsg coccyx intact, boots on, scd and accumax in use. ivf infusing, nad, st - sr on tele.

## 2022-03-11 NOTE — PLAN OF CARE
Goal Outcome Evaluation:                Problem: Adult Inpatient Plan of Care  Goal: Plan of Care Review  Flowsheets (Taken 3/11/2022 7308)  Outcome Evaluation: VFSS completed- see report for details. Suspect patient's swallow is at baseline.   Recommend pureed diet/thin liquids (which is his baseline diet).   Meds whole or crushed PRN w/puree.   1:1 assist with PO, small bites and drinks via straw. Upright for any PO intake.   SLP to follow for diet tolerance x1 and s/o if no concerns.

## 2022-03-12 PROBLEM — J96.00 ACUTE RESPIRATORY FAILURE, UNSPECIFIED WHETHER WITH HYPOXIA OR HYPERCAPNIA (HCC): Status: RESOLVED | Noted: 2022-03-09 | Resolved: 2022-03-12

## 2022-03-12 PROBLEM — E87.20 LACTIC ACIDOSIS: Status: RESOLVED | Noted: 2022-03-12 | Resolved: 2022-03-12

## 2022-03-12 PROBLEM — E87.20 LACTIC ACIDOSIS: Status: ACTIVE | Noted: 2022-03-12

## 2022-03-12 LAB
ANION GAP SERPL CALCULATED.3IONS-SCNC: 11 MMOL/L (ref 5–15)
BASOPHILS # BLD AUTO: 0.05 10*3/MM3 (ref 0–0.2)
BASOPHILS NFR BLD AUTO: 0.6 % (ref 0–1.5)
BUN SERPL-MCNC: 3 MG/DL (ref 8–23)
BUN/CREAT SERPL: 5.3 (ref 7–25)
CALCIUM SPEC-SCNC: 8.3 MG/DL (ref 8.6–10.5)
CHLORIDE SERPL-SCNC: 108 MMOL/L (ref 98–107)
CO2 SERPL-SCNC: 20 MMOL/L (ref 22–29)
CREAT SERPL-MCNC: 0.57 MG/DL (ref 0.76–1.27)
DEPRECATED RDW RBC AUTO: 52.7 FL (ref 37–54)
EGFRCR SERPLBLD CKD-EPI 2021: 108.8 ML/MIN/1.73
EOSINOPHIL # BLD AUTO: 0.16 10*3/MM3 (ref 0–0.4)
EOSINOPHIL NFR BLD AUTO: 2.1 % (ref 0.3–6.2)
ERYTHROCYTE [DISTWIDTH] IN BLOOD BY AUTOMATED COUNT: 15.1 % (ref 12.3–15.4)
GLUCOSE BLDC GLUCOMTR-MCNC: 89 MG/DL (ref 70–130)
GLUCOSE SERPL-MCNC: 95 MG/DL (ref 65–99)
HCT VFR BLD AUTO: 34.6 % (ref 37.5–51)
HGB BLD-MCNC: 11.3 G/DL (ref 13–17.7)
IMM GRANULOCYTES # BLD AUTO: 0.03 10*3/MM3 (ref 0–0.05)
IMM GRANULOCYTES NFR BLD AUTO: 0.4 % (ref 0–0.5)
LYMPHOCYTES # BLD AUTO: 1.27 10*3/MM3 (ref 0.7–3.1)
LYMPHOCYTES NFR BLD AUTO: 16.4 % (ref 19.6–45.3)
MCH RBC QN AUTO: 30.8 PG (ref 26.6–33)
MCHC RBC AUTO-ENTMCNC: 32.7 G/DL (ref 31.5–35.7)
MCV RBC AUTO: 94.3 FL (ref 79–97)
MONOCYTES # BLD AUTO: 0.56 10*3/MM3 (ref 0.1–0.9)
MONOCYTES NFR BLD AUTO: 7.2 % (ref 5–12)
NEUTROPHILS NFR BLD AUTO: 5.69 10*3/MM3 (ref 1.7–7)
NEUTROPHILS NFR BLD AUTO: 73.3 % (ref 42.7–76)
NRBC BLD AUTO-RTO: 0 /100 WBC (ref 0–0.2)
PLATELET # BLD AUTO: 180 10*3/MM3 (ref 140–450)
PMV BLD AUTO: 11.2 FL (ref 6–12)
POTASSIUM SERPL-SCNC: 3.6 MMOL/L (ref 3.5–5.2)
RBC # BLD AUTO: 3.67 10*6/MM3 (ref 4.14–5.8)
SODIUM SERPL-SCNC: 139 MMOL/L (ref 136–145)
WBC NRBC COR # BLD: 7.76 10*3/MM3 (ref 3.4–10.8)

## 2022-03-12 PROCEDURE — 85025 COMPLETE CBC W/AUTO DIFF WBC: CPT | Performed by: INTERNAL MEDICINE

## 2022-03-12 PROCEDURE — 25010000002 ENOXAPARIN PER 10 MG: Performed by: INTERNAL MEDICINE

## 2022-03-12 PROCEDURE — 80048 BASIC METABOLIC PNL TOTAL CA: CPT | Performed by: INTERNAL MEDICINE

## 2022-03-12 PROCEDURE — 36415 COLL VENOUS BLD VENIPUNCTURE: CPT | Performed by: INTERNAL MEDICINE

## 2022-03-12 PROCEDURE — 0 POTASSIUM CHLORIDE 10 MEQ/100ML SOLUTION: Performed by: INTERNAL MEDICINE

## 2022-03-12 PROCEDURE — 25010000002 LEVETRIRACETAM PER 10 MG: Performed by: PSYCHIATRY & NEUROLOGY

## 2022-03-12 PROCEDURE — 82962 GLUCOSE BLOOD TEST: CPT

## 2022-03-12 RX ORDER — LEVETIRACETAM 100 MG/ML
1250 SOLUTION ORAL 2 TIMES DAILY
Qty: 750 ML | Refills: 0 | Status: SHIPPED | OUTPATIENT
Start: 2022-03-12 | End: 2022-03-14 | Stop reason: HOSPADM

## 2022-03-12 RX ADMIN — ENOXAPARIN SODIUM 40 MG: 100 INJECTION SUBCUTANEOUS at 08:57

## 2022-03-12 RX ADMIN — CASTOR OIL AND BALSAM, PERU 1 APPLICATION: 788; 87 OINTMENT TOPICAL at 20:39

## 2022-03-12 RX ADMIN — Medication 10 ML: at 20:40

## 2022-03-12 RX ADMIN — POTASSIUM CHLORIDE 10 MEQ: 7.46 INJECTION, SOLUTION INTRAVENOUS at 04:34

## 2022-03-12 RX ADMIN — POTASSIUM CHLORIDE 10 MEQ: 7.46 INJECTION, SOLUTION INTRAVENOUS at 00:41

## 2022-03-12 RX ADMIN — ZINC OXIDE 1 APPLICATION: 200 OINTMENT TOPICAL at 20:39

## 2022-03-12 RX ADMIN — CASTOR OIL AND BALSAM, PERU 1 APPLICATION: 788; 87 OINTMENT TOPICAL at 08:57

## 2022-03-12 RX ADMIN — POTASSIUM CHLORIDE, DEXTROSE MONOHYDRATE AND SODIUM CHLORIDE 75 ML/HR: 150; 5; 450 INJECTION, SOLUTION INTRAVENOUS at 06:22

## 2022-03-12 RX ADMIN — LEVOTHYROXINE SODIUM 125 MCG: 0.12 TABLET ORAL at 06:19

## 2022-03-12 RX ADMIN — POTASSIUM CHLORIDE 10 MEQ: 7.46 INJECTION, SOLUTION INTRAVENOUS at 01:46

## 2022-03-12 RX ADMIN — POTASSIUM CHLORIDE 10 MEQ: 7.46 INJECTION, SOLUTION INTRAVENOUS at 03:08

## 2022-03-12 RX ADMIN — Medication 10 ML: at 08:57

## 2022-03-12 RX ADMIN — LEVETIRACETAM 1250 MG: 100 INJECTION, SOLUTION, CONCENTRATE INTRAVENOUS at 08:57

## 2022-03-12 RX ADMIN — LEVETIRACETAM 1250 MG: 100 INJECTION, SOLUTION, CONCENTRATE INTRAVENOUS at 20:38

## 2022-03-12 RX ADMIN — PANTOPRAZOLE SODIUM 40 MG: 40 INJECTION, POWDER, LYOPHILIZED, FOR SOLUTION INTRAVENOUS at 11:09

## 2022-03-12 RX ADMIN — ZINC OXIDE 1 APPLICATION: 200 OINTMENT TOPICAL at 17:48

## 2022-03-12 RX ADMIN — LAMOTRIGINE 100 MG: 100 TABLET ORAL at 20:39

## 2022-03-12 RX ADMIN — LAMOTRIGINE 100 MG: 100 TABLET ORAL at 08:57

## 2022-03-12 RX ADMIN — ZINC OXIDE 1 APPLICATION: 200 OINTMENT TOPICAL at 08:57

## 2022-03-12 NOTE — DISCHARGE SUMMARY
DISCHARGE SUMMARY    Patient Name: Ned Basilio  Age/Sex: 66 y.o. male  : 1955  MRN: [id number]  Patient Care Team:  Seth Villanueva MD as PCP - General (Internal Medicine)  Cedric Kwok MD as Consulting Physician (Gastroenterology)       Date of Admit: 3/8/2022  Date of Discharge:  22  Discharge Condition: Good    Discharge Diagnoses:  1. Seizure, controlled on Keppra and Lamictal  2. Mechanical ventilation at 1 point, currently doing well and extubated and is on nasal cannula oxygen 2 L/min  3. Lactic acidosis secondary to seizure, resolved  4. Electrode abnormalities, corrected  5. Acid reflux on Pepcid  6. Hypothyroidism on hormone replacement  7. Cerebral palsy  8. Positive blood culture, likely contaminant, on no antibiotics with coagulase-negative staph on the final ID  9. Dysphagia, currently on p.o. diet with recommendation for purée and thin liquids, per speech therapy they believe patient is at baseline      History of present illness from H&P from 3/8/2022:   This is a 66-year-old male patient, resident of a group home, with history of seizure on Keppra and Lamictal.  No reports of recent seizure..  His baseline mental status is unknown.     He was getting a bowel prep today and then had Generalized tonico clonic seizure which continued up until her got to the ED. He received 10 mg intranasal Versed in route by EMS with no relief.      Due to seizure and altered mental status, he was intubated in the ED. He received succinylcholine 100 mg and etomidate 20 mg for intubation.. He also received Keppra 1 g.  Seizure stopped afterward.     He is currently on mechanical ventilation with AC VC: 16/550/60 %/7.5.  He is sedated with low-dose propofol.  He does not respond to verbal stimuli but withdraws extremities slightly to pain.    Hospital Course:   Ned Basilio presented to [facility] with complaints of new onset seizure, he was already on Keppra and  Lamictal, patient was controlled in the hospital with initial use of benzodiazepine, patient was seen in consultation by neurology, he was modified in his regimen by increasing the dose of the Keppra and he was cleared for discharge afterwards.  Patient needed to be intubated for airway protection and was weaned off the ventilator, he is still on low amount of oxygen at 2 L/min which can be gradually weaned and hopefully discontinued.  Patient should be cleared for discharge from the medical standpoint  He had some dysphagia, was evaluated by speech therapy, they feel that this is his baseline given his cerebral palsy and the patient was cleared to discharge home on thin liquids with some recommendation regarding the diet consistency as clearly stated on their consult note.  Below is there are reported finding and recommendation:  Outcome Evaluation: VFSS completed-  Suspect patient's swallow is at baseline. Recommend pureed diet/thin liquids (which is his baseline diet). Meds whole or crushed PRN w/puree. 1:1 assist with PO, small bites and drinks via straw. Upright for any PO intake. SLP to follow for diet tolerance x1 and s/o if no concerns.       Consults:   IP CONSULT TO PULMONOLOGY  IP CONSULT TO NEUROLOGY    Significant Discharge Diagnostics   Procedures Performed:         Pertinent Lab Results:  Results from last 7 days   Lab Units 03/12/22  0747 03/11/22  2143 03/11/22  0312 03/10/22  0339 03/08/22  2347   SODIUM mmol/L 139  --  144 142 135*   POTASSIUM mmol/L 3.6 3.0* 2.9* 3.1* 3.2*   CHLORIDE mmol/L 108*  --  110* 109* 99   CO2 mmol/L 20.0*  --  20.0* 22.0 16.1*   BUN mg/dL 3*  --  7* 7* 4*   CREATININE mg/dL 0.57*  --  0.60* 0.86 0.70*   GLUCOSE mg/dL 95  --  59* 77 121*   CALCIUM mg/dL 8.3*  --  8.8 8.4* 9.5   AST (SGOT) U/L  --   --   --   --  30   ALT (SGPT) U/L  --   --   --   --  26     Results from last 7 days   Lab Units 03/08/22  2347   TROPONIN T ng/mL <0.010     Results from last 7 days   Lab  Units 03/12/22  0747 03/11/22  0312 03/10/22  0339 03/08/22  2347   WBC 10*3/mm3 7.76 11.61* 12.35* 10.94*   HEMOGLOBIN g/dL 11.3* 12.3* 13.3 15.1   HEMATOCRIT % 34.6* 37.0* 39.6 44.8   PLATELETS 10*3/mm3 180 203 209 266   MCV fL 94.3 92.3 92.1 92.2   MCH pg 30.8 30.7 30.9 31.1   MCHC g/dL 32.7 33.2 33.6 33.7   RDW % 15.1 15.3 15.1 14.6   RDW-SD fl 52.7 51.7 50.6 49.0   MPV fL 11.2 10.6 10.8 10.4   NEUTROPHIL % % 73.3 81.3* 80.9* 79.3*   LYMPHOCYTE % % 16.4* 10.6* 11.5* 13.2*   MONOCYTES % % 7.2 6.1 5.7 6.0   EOSINOPHIL % % 2.1 1.0 1.1 0.4   BASOPHIL % % 0.6 0.6 0.6 0.5   IMM GRAN % % 0.4 0.4 0.2 0.6*   NEUTROS ABS 10*3/mm3 5.69 9.43* 9.98* 8.68*   LYMPHS ABS 10*3/mm3 1.27 1.23 1.42 1.44   MONOS ABS 10*3/mm3 0.56 0.71 0.70 0.66   EOS ABS 10*3/mm3 0.16 0.12 0.14 0.04   BASOS ABS 10*3/mm3 0.05 0.07 0.08 0.05   IMMATURE GRANS (ABS) 10*3/mm3 0.03 0.05 0.03 0.07*   NRBC /100 WBC 0.0 0.0 0.0 0.0     Results from last 7 days   Lab Units 03/08/22  2347   INR  1.03   APTT seconds 28.0     Results from last 7 days   Lab Units 03/11/22  0312   MAGNESIUM mg/dL 1.9     Results from last 7 days   Lab Units 03/09/22  1830   TRIGLYCERIDES mg/dL 68         Results from last 7 days   Lab Units 03/08/22  2347   TSH uIU/mL 3.200     Results from last 7 days   Lab Units 03/09/22  0159   PH, ARTERIAL pH units 7.349*   PCO2, ARTERIAL mm Hg 42.1   PO2 ART mm Hg 238.6*   HCO3 ART mmol/L 23.2     Results from last 7 days   Lab Units 03/10/22  0339 03/09/22  0511 03/08/22  2347   PROCALCITONIN ng/mL  --   --  0.04   LACTATE mmol/L 1.1 4.3* 8.6*             Results from last 7 days   Lab Units 03/09/22  0511 03/09/22  0437   BLOODCX  No growth at 3 days  Staphylococcus, coagulase negative*  --    RESPCX   --  Scant growth (1+) Normal respiratory qasim. No S. aureus or Pseudomonas aeruginosa detected. Final report.   BCIDPCR  Staph spp, not aureus or lugdunesis. Identification by BCID2 PCR.*  --                    Imaging Results:  Imaging  Results (All)     Procedure Component Value Units Date/Time    FL Video Swallow With Speech Single Contrast [[id number]] Collected: 03/11/22 1528     Updated: 03/11/22 1602    Narrative:      VIDEO SWALLOWING EXAM     HISTORY: Dysphagia     FINDINGS: There is moderate oral and minimal pharyngeal dysphagia.  Decreased lingual bolus control is present and there is premature  spillage to the piriform sinuses. No penetration or aspiration is  witnessed. I was present for the entirety of the exam. Exam also will be  reported with recommendations to be made separately by the speech  pathologist (see report).     FLUOROSCOPY TIME: 40 seconds, 1161 images.     This report was finalized on 3/11/2022 3:59 PM by Dr. Wesley Marcelo M.D.       CT Head Without Contrast [[id number]] Collected: 03/09/22 0132     Updated: 03/09/22 0140    Narrative:      CT HEAD WITHOUT CONTRAST     HISTORY: Seizure     COMPARISON: 10/10/2021     TECHNIQUE: Axial CT imaging was obtained through the brain. No IV  contrast was administered.     FINDINGS:  No acute intracranial hemorrhage is seen. There is atrophy. There is  periventricular and deep white matter microangiopathic change. There is  no midline shift or mass effect. No calvarial fracture is seen. There is  extensive opacification of the ethmoid sinuses. Mucosal thickening and  mucous retention cysts are noted within the maxillary sinuses  bilaterally. There are inspissated secretions which are noted within the  left sphenoid sinus, as well as mucosal thickening within the right  sphenoid sinus. There is mild stable opacification of the left mastoid  air cells.       Impression:         1. No acute intracranial findings.  2. Sinus inflammatory changes.     Radiation dose reduction techniques were utilized, including automated  exposure control and exposure modulation based on body size.     This report was finalized on 3/9/2022 1:37 AM by Dr. Brianna Greco M.D.       XR Chest 1 View  [[id number]] Collected: 03/09/22 0039     Updated: 03/09/22 0043    Narrative:      SINGLE VIEW OF THE CHEST     HISTORY: Intubation     COMPARISON: 10/10/2021     FINDINGS:  Endotracheal tube terminates in satisfactory position. Cardiac  silhouette is stable when compared to prior imaging. It does appear to  be some right basilar infiltrate. There is left basilar consolidation,  and overall volume loss within the left hemithorax, although this is a  stable finding when compared to prior a.m. No pneumothorax is seen.  There is severe lumbar scoliosis.       Impression:      Endotracheal tube terminates in satisfactory position.     This report was finalized on 3/9/2022 12:40 AM by Dr. Brianna Greco M.D.             Objective:   Temp:  [97.9 °F (36.6 °C)-99.5 °F (37.5 °C)] 99.5 °F (37.5 °C)  Heart Rate:  [] 92  Resp:  [16-18] 17  BP: (120-165)/(69-90) 146/79   SpO2:  [90 %-94 %] 94 %  on  Flow (L/min):  [2] 2 Device (Oxygen Therapy): nasal cannula    Intake/Output Summary (Last 24 hours) at 3/12/2022 1447  Last data filed at 3/12/2022 0858  Gross per 24 hour   Intake 2492.5 ml   Output --   Net 2492.5 ml     Body mass index is 34.92 kg/m².      03/10/22  0600 03/11/22  0508 03/12/22  0343   Weight: 78.7 kg (173 lb 8 oz) 78.9 kg (173 lb 15.1 oz) 81.1 kg (178 lb 12.7 oz)     Weight change: 2.2 kg (4 lb 13.6 oz)    Physical Exam:      General: Arousable, may respond to physical stimulation, did not follow commands, in no distress, on nasal cannula oxygen         HEENT:  No oral lesions. No thrush. Oral mucosa moist.  Large tongue, short obese neck   Chest Wall:  No abnormalities observed.             Neck:  Trachea midline. No JVD.   Pulmonary:  CTAB.  Minimal rhonchi, no crackles. Respirations regular, even and unlabored.          Cardio:  Regular rhythm and normal rate. Normal S1 and S2. No murmur, gallop, rub or click.    Abdominal:  Soft, non-tender and non-distended. Normal bowel sounds. No masses. No  organomegaly. No guarding. No rebound tenderness.  Truncal obesity  Extremities:  Restricted movement, trace edema.    Discharge Medications and Instructions:     Discharge Medications     Discharge Medications      New Medications      Instructions Start Date   levETIRAcetam 100 MG/ML solution  Commonly known as: Keppra  Replaces: levETIRAcetam 1000 MG tablet   1,250 mg, Oral, 2 Times Daily         Continue These Medications      Instructions Start Date   albuterol (2.5 MG/3ML) 0.083% nebulizer solution  Commonly known as: PROVENTIL   2.5 mg, Nebulization, Every 4 Hours PRN      aspirin 81 MG EC tablet   81 mg, Oral, Daily      CALCIUM CITRATE + D PO   1 tablet, Oral, Daily      chlorhexidine 0.12 % solution  Commonly known as: PERIDEX   No dose, route, or frequency recorded.      dantrolene 25 MG capsule  Commonly known as: DANTRIUM   25 mg, Oral, Daily      Desitin 13 % cream cream  Generic drug: zinc oxide   No dose, route, or frequency recorded.      ezetimibe 10 MG tablet  Commonly known as: ZETIA   10 mg, Oral, Daily      fluticasone 50 MCG/ACT nasal spray  Commonly known as: FLONASE   No dose, route, or frequency recorded.      HYDROcodone-acetaminophen 7.5-325 MG per tablet  Commonly known as: NORCO   Take 1-2 tabs po q 4 hours prn pain      isosorbide dinitrate 20 MG tablet  Commonly known as: ISORDIL   20 mg, Oral, Every 12 Hours      lamoTRIgine 100 MG tablet  Commonly known as: LaMICtal   100 mg, Oral, 2 Times Daily      Laxative 10 MG suppository  Generic drug: bisacodyl   10 mg, Rectal, Daily      levothyroxine 125 MCG tablet  Commonly known as: SYNTHROID, LEVOTHROID   125 mcg, Oral, Daily      linaclotide 145 MCG capsule capsule  Commonly known as: Linzess   145 mcg, Oral, Every Morning Before Breakfast      metoprolol tartrate 25 MG tablet  Commonly known as: LOPRESSOR   25 mg, Oral, 2 Times Daily      Mi-Acid Gas Relief 80 MG chewable tablet  Generic drug: simethicone   CHEW 2 TABLETS 2 (TWO) TIMES  A DAY FOR 30 DAYS.      miconazole 2 % powder  Commonly known as: MICOTIN   Topical, 2 Times Daily, Apply powder to intertriginous rash two times daily       montelukast 10 MG tablet  Commonly known as: SINGULAIR   10 mg, Oral, Nightly      Nasonex 50 MCG/ACT nasal spray  Generic drug: mometasone   2 sprays, Nasal, Daily      omeprazole 40 MG capsule  Commonly known as: priLOSEC   40 mg, Oral, Every 12 Hours      polyethylene glycol 17 g packet  Commonly known as: MIRALAX   17 g, Oral, Daily      senna 8.6 MG tablet  Commonly known as: SENOKOT   3 tablets, Oral, 2 Times Daily      silver sulfadiazine 1 % cream  Commonly known as: SILVADENE, SSD   No dose, route, or frequency recorded.      TAB-A-KATE/IRON PO   1 tablet, Oral, Daily      Vitamin D-3 25 MCG (1000 UT) capsule   1,000 Units, Oral, 2 Times Daily         Stop These Medications    baclofen 10 MG tablet  Commonly known as: LIORESAL     levETIRAcetam 1000 MG tablet  Commonly known as: KEPPRA  Replaced by: levETIRAcetam 100 MG/ML solution     multivitamin with minerals tablet tablet     vitamin D 1.25 MG (00291 UT) capsule capsule  Commonly known as: ERGOCALCIFEROL            Discharge Diet:    Dietary Orders (From admission, onward)     Start     Ordered    03/11/22 1256  Diet Dysphagia; II - Pureed; Thin  Diet Effective Now        Question Answer Comment   Diet Texture / Consistency Dysphagia    Select Dysphagia level: II - Pureed    Fluid Consistency: Thin        03/11/22 1255                Activity at Discharge:   As tolerated    Discharge disposition: Skilled nursing facility/group home    Discharge Instructions and Follow ups:      Follow-up Information     Seth Villanueva MD .    Specialty: Internal Medicine  Contact information:  [address] [place] [address] [phone]                       No future appointments.     Medication Reconciliation: Please see electronically completed Med Rec.    Total time spent discharging patient  including evaluation, medication reconciliation, arranging follow up, and post hospitalization instructions and education total time exceeds 30 minutes.     Sarika Donahue MD  03/12/22  14:47 EST      Dictated utilizing Dragon dictation

## 2022-03-12 NOTE — PLAN OF CARE
Goal Outcome Evaluation:  Plan of Care Reviewed With: patient           Outcome Evaluation: VSS. 2L NC. Non-verbal. Meds given per MAR. Q2 turns. IVF infusing per orders. Pt has d/c orders -- per CCP patient will return to group home via their transportation on Monday. Will CTM.

## 2022-03-12 NOTE — CASE MANAGEMENT/SOCIAL WORK
Continued Stay Note  [facility]     Patient Name: Ned Basilio  MRN: [id number]  Today's Date: 3/12/2022    Admit Date: 3/8/2022     Discharge Plan     Row Name 03/12/22 1505       Plan    Plan DC to Group Home on Monday. Facility to arrange transportation    Plan Comments [facility] rec'd call from nurse jeimy asking if pt canreturn to Group Home today.  [facility] called [facility]- [phone] and pt cannot return until Monday 3/14.  He will arrange for transportation when pt has all he needs  for group home.  [facility] informed Nurse, Jeimy, who voiced understanding.  CCP will continue to follow. ThanksKrys               Discharge Codes    No documentation.               Expected Discharge Date and Time     Expected Discharge Date Expected Discharge Time    Mar 12, 2022             Krys Valencia

## 2022-03-12 NOTE — PLAN OF CARE
Goal Outcome Evaluation:  Plan of Care Reviewed With: patient        Progress: no change  Outcome Evaluation: vss. remains on 2L NC overnight. seizure precautions in place. turn q2hr. IVF infusing per order. K+ replaced per protocol-recheck with am labs. tolerated meds crushed with applesauce. rest encouraged.

## 2022-03-13 LAB
ANION GAP SERPL CALCULATED.3IONS-SCNC: 9.4 MMOL/L (ref 5–15)
BASOPHILS # BLD AUTO: 0.06 10*3/MM3 (ref 0–0.2)
BASOPHILS NFR BLD AUTO: 0.9 % (ref 0–1.5)
BUN SERPL-MCNC: 2 MG/DL (ref 8–23)
BUN/CREAT SERPL: 3.7 (ref 7–25)
CALCIUM SPEC-SCNC: 8.6 MG/DL (ref 8.6–10.5)
CHLORIDE SERPL-SCNC: 106 MMOL/L (ref 98–107)
CO2 SERPL-SCNC: 22.6 MMOL/L (ref 22–29)
CREAT SERPL-MCNC: 0.54 MG/DL (ref 0.76–1.27)
DEPRECATED RDW RBC AUTO: 48.7 FL (ref 37–54)
EGFRCR SERPLBLD CKD-EPI 2021: 110.6 ML/MIN/1.73
EOSINOPHIL # BLD AUTO: 0.13 10*3/MM3 (ref 0–0.4)
EOSINOPHIL NFR BLD AUTO: 1.9 % (ref 0.3–6.2)
ERYTHROCYTE [DISTWIDTH] IN BLOOD BY AUTOMATED COUNT: 14.7 % (ref 12.3–15.4)
GLUCOSE SERPL-MCNC: 98 MG/DL (ref 65–99)
HCT VFR BLD AUTO: 34.7 % (ref 37.5–51)
HGB BLD-MCNC: 11.8 G/DL (ref 13–17.7)
IMM GRANULOCYTES # BLD AUTO: 0.05 10*3/MM3 (ref 0–0.05)
IMM GRANULOCYTES NFR BLD AUTO: 0.7 % (ref 0–0.5)
LYMPHOCYTES # BLD AUTO: 1.47 10*3/MM3 (ref 0.7–3.1)
LYMPHOCYTES NFR BLD AUTO: 21.4 % (ref 19.6–45.3)
MCH RBC QN AUTO: 31.1 PG (ref 26.6–33)
MCHC RBC AUTO-ENTMCNC: 34 G/DL (ref 31.5–35.7)
MCV RBC AUTO: 91.3 FL (ref 79–97)
MONOCYTES # BLD AUTO: 0.65 10*3/MM3 (ref 0.1–0.9)
MONOCYTES NFR BLD AUTO: 9.5 % (ref 5–12)
NEUTROPHILS NFR BLD AUTO: 4.5 10*3/MM3 (ref 1.7–7)
NEUTROPHILS NFR BLD AUTO: 65.6 % (ref 42.7–76)
NRBC BLD AUTO-RTO: 0 /100 WBC (ref 0–0.2)
PLATELET # BLD AUTO: 182 10*3/MM3 (ref 140–450)
PMV BLD AUTO: 10.5 FL (ref 6–12)
POTASSIUM SERPL-SCNC: 3.3 MMOL/L (ref 3.5–5.2)
RBC # BLD AUTO: 3.8 10*6/MM3 (ref 4.14–5.8)
SODIUM SERPL-SCNC: 138 MMOL/L (ref 136–145)
WBC NRBC COR # BLD: 6.86 10*3/MM3 (ref 3.4–10.8)

## 2022-03-13 PROCEDURE — 80048 BASIC METABOLIC PNL TOTAL CA: CPT | Performed by: INTERNAL MEDICINE

## 2022-03-13 PROCEDURE — 85025 COMPLETE CBC W/AUTO DIFF WBC: CPT | Performed by: INTERNAL MEDICINE

## 2022-03-13 PROCEDURE — 25010000002 LEVETRIRACETAM PER 10 MG: Performed by: PSYCHIATRY & NEUROLOGY

## 2022-03-13 PROCEDURE — 25010000002 ENOXAPARIN PER 10 MG: Performed by: INTERNAL MEDICINE

## 2022-03-13 RX ADMIN — LEVETIRACETAM 1250 MG: 100 INJECTION, SOLUTION, CONCENTRATE INTRAVENOUS at 20:49

## 2022-03-13 RX ADMIN — Medication 10 ML: at 09:27

## 2022-03-13 RX ADMIN — LAMOTRIGINE 100 MG: 100 TABLET ORAL at 09:26

## 2022-03-13 RX ADMIN — ZINC OXIDE 1 APPLICATION: 200 OINTMENT TOPICAL at 09:26

## 2022-03-13 RX ADMIN — LEVOTHYROXINE SODIUM 125 MCG: 0.12 TABLET ORAL at 07:33

## 2022-03-13 RX ADMIN — Medication 10 ML: at 20:49

## 2022-03-13 RX ADMIN — LEVETIRACETAM 1250 MG: 100 INJECTION, SOLUTION, CONCENTRATE INTRAVENOUS at 09:26

## 2022-03-13 RX ADMIN — ENOXAPARIN SODIUM 40 MG: 100 INJECTION SUBCUTANEOUS at 09:27

## 2022-03-13 RX ADMIN — CASTOR OIL AND BALSAM, PERU 1 APPLICATION: 788; 87 OINTMENT TOPICAL at 09:26

## 2022-03-13 RX ADMIN — CASTOR OIL AND BALSAM, PERU 1 APPLICATION: 788; 87 OINTMENT TOPICAL at 20:49

## 2022-03-13 RX ADMIN — LAMOTRIGINE 100 MG: 100 TABLET ORAL at 20:49

## 2022-03-13 RX ADMIN — PANTOPRAZOLE SODIUM 40 MG: 40 INJECTION, POWDER, LYOPHILIZED, FOR SOLUTION INTRAVENOUS at 13:33

## 2022-03-13 RX ADMIN — POTASSIUM CHLORIDE, DEXTROSE MONOHYDRATE AND SODIUM CHLORIDE 75 ML/HR: 150; 5; 450 INJECTION, SOLUTION INTRAVENOUS at 13:33

## 2022-03-13 RX ADMIN — ZINC OXIDE 1 APPLICATION: 200 OINTMENT TOPICAL at 20:49

## 2022-03-13 NOTE — PROGRESS NOTES
"  PROGRESS NOTE  Patient Name: Ned Basilio  Age/Sex: 66 y.o. male  : 1955  MRN: [id number]    Date of Admission: 3/8/2022  Date of Encounter Visit: 22   LOS: 4 days   Patient Care Team:  Seth Villanueva MD as PCP - General (Internal Medicine)  Cedric Kwok MD as Consulting Physician (Gastroenterology)    Chief Complaint: Patient has cerebral palsy came in with seizure, with on the ventilator transiently and currently on nasal cannula oxygen    Hospital course: No significant changes in his status compared to yesterday, still waiting on a bed at the same facility        REVIEW OF SYSTEMS:   CONSTITUTIONAL: no fever or chills  System review given his mental status    Ventilator/Non-Invasive Ventilation Settings (From admission, onward)             Start     Ordered    22 0545  Ventilator - AC/VC; (16); 60; 90%; 7.5; 450  Continuous,   Status:  Canceled        Question Answer Comment   Vent Mode AC/VC    Breath rate  16   FiO2 60    FiO2 titrate for Sp02% =/> 90%    PEEP 7.5    Tidal Volume 450        22 0544                  Vital Signs  Temp:  [98.5 °F (36.9 °C)-99.5 °F (37.5 °C)] 98.5 °F (36.9 °C)  Heart Rate:  [71-75] 73  Resp:  [17] 17  BP: (128-147)/(69-79) 139/69  SpO2:  [90 %-95 %] 93 %  on  Flow (L/min):  [2] 2 Device (Oxygen Therapy): nasal cannula  No intake or output data in the 24 hours ending 22 1450  Flowsheet Rows    Flowsheet Row First Filed Value   Admission Height 152.4 cm (60\") Documented at 2022 2328   Admission Weight 87.3 kg (192 lb 7.4 oz) Documented at 2022 2353        Body mass index is 35.05 kg/m².      22  0508 22  0343 22  0510   Weight: 78.9 kg (173 lb 15.1 oz) 81.1 kg (178 lb 12.7 oz) 81.4 kg (179 lb 7.3 oz)       Physical Exam:      General:     Arousable, may respond to physical stimulation, did not follow commands, in no distress, on nasal cannula oxygen         HEENT:    No oral lesions. No thrush. Oral " mucosa moist.  Large tongue, short obese neck   Chest Wall:   No abnormalities observed.             Neck:   Trachea midline. No JVD.   Pulmonary:   CTAB.  Minimal rhonchi, no crackles. Respirations regular, even and unlabored.          Cardio:   Regular rhythm and normal rate. Normal S1 and S2. No murmur, gallop, rub or click.    Abdominal:   Soft, non-tender and non-distended. Normal bowel sounds. No masses. No organomegaly. No guarding. No rebound tenderness.  Truncal obesity  Extremities:   Restricted movement, trace edema.    Results Review:    Results From Last 14 Days   Lab Units 03/10/22  0339 03/09/22  1830 03/09/22  0511 03/08/22  2347   LACTATE mmol/L 1.1  --  4.3* 8.6*   TRIGLYCERIDES mg/dL  --  68  --   --      Results from last 7 days   Lab Units 03/13/22  0800 03/12/22  0747 03/11/22  2143 03/11/22  0312 03/10/22  0339 03/08/22  2347   SODIUM mmol/L 138 139  --  144 142 135*   POTASSIUM mmol/L 3.3* 3.6 3.0* 2.9* 3.1* 3.2*   CHLORIDE mmol/L 106 108*  --  110* 109* 99   CO2 mmol/L 22.6 20.0*  --  20.0* 22.0 16.1*   BUN mg/dL 2* 3*  --  7* 7* 4*   CREATININE mg/dL 0.54* 0.57*  --  0.60* 0.86 0.70*   CALCIUM mg/dL 8.6 8.3*  --  8.8 8.4* 9.5   AST (SGOT) U/L  --   --   --   --   --  30   ALT (SGPT) U/L  --   --   --   --   --  26   ANION GAP mmol/L 9.4 11.0  --  14.0 11.0 19.9*   ALBUMIN g/dL  --   --   --   --   --  3.60     Results from last 7 days   Lab Units 03/08/22  2347   TROPONIN T ng/mL <0.010     Results from last 7 days   Lab Units 03/08/22  2347   TSH uIU/mL 3.200         Results from last 7 days   Lab Units 03/13/22  0800 03/12/22  0747 03/11/22  0312 03/10/22  0339 03/08/22  2347   WBC 10*3/mm3 6.86 7.76 11.61* 12.35* 10.94*   HEMOGLOBIN g/dL 11.8* 11.3* 12.3* 13.3 15.1   HEMATOCRIT % 34.7* 34.6* 37.0* 39.6 44.8   PLATELETS 10*3/mm3 182 180 203 209 266   MCV fL 91.3 94.3 92.3 92.1 92.2   NEUTROPHIL % % 65.6 73.3 81.3* 80.9* 79.3*   LYMPHOCYTE % % 21.4 16.4* 10.6* 11.5* 13.2*   MONOCYTES % %  9.5 7.2 6.1 5.7 6.0   EOSINOPHIL % % 1.9 2.1 1.0 1.1 0.4   BASOPHIL % % 0.9 0.6 0.6 0.6 0.5   IMM GRAN % % 0.7* 0.4 0.4 0.2 0.6*     Results from last 7 days   Lab Units 03/08/22  2347   INR  1.03   APTT seconds 28.0     Results from last 7 days   Lab Units 03/11/22  0312   MAGNESIUM mg/dL 1.9     Results from last 7 days   Lab Units 03/09/22  1830   TRIGLYCERIDES mg/dL 68     Results from last 7 days   Lab Units 03/09/22  0159   PH, ARTERIAL pH units 7.349*   PCO2, ARTERIAL mm Hg 42.1   PO2 ART mm Hg 238.6*   HCO3 ART mmol/L 23.2         Glucose   Date/Time Value Ref Range Status   03/12/2022 0633 89 70 - 130 mg/dL Final     Comment:     Meter: [id number] : [id number] Kerri Mcmullen   03/11/2022 1240 159 (H) 70 - 130 mg/dL Final     Comment:     Meter: [id number] : [id number] Eric Domínguez RN   03/11/2022 1215 68 (L) 70 - 130 mg/dL Final     Comment:     Meter: [id number] : [id number] Yrn HUGO   03/11/2022 0501 169 (H) 70 - 130 mg/dL Final     Comment:     Meter: [id number] : [id number] Jesus John RN     Results from last 7 days   Lab Units 03/10/22  0339 03/09/22  0511 03/08/22  2347   PROCALCITONIN ng/mL  --   --  0.04   LACTATE mmol/L 1.1 4.3* 8.6*     Results from last 7 days   Lab Units 03/11/22  2143 03/09/22  0511 03/09/22  0437   BLOODCX  No growth at 24 hours  No growth at 24 hours No growth at 4 days  Staphylococcus, coagulase negative*  --    RESPCX   --   --  Scant growth (1+) Normal respiratory qasim. No S. aureus or Pseudomonas aeruginosa detected. Final report.   BCIDPCR   --  Staph spp, not aureus or lugdunesis. Identification by BCID2 PCR.*  --          Results from last 7 days   Lab Units 03/08/22  2351   COVID19  Not Detected               Imaging:   Imaging Results (All)     Procedure Component Value Units Date/Time    FL Video Swallow With Speech Single Contrast [[id number]] Collected: 03/11/22 1528     Updated: 03/11/22 1602    Narrative:      VIDEO  SWALLOWING EXAM     HISTORY: Dysphagia     FINDINGS: There is moderate oral and minimal pharyngeal dysphagia.  Decreased lingual bolus control is present and there is premature  spillage to the piriform sinuses. No penetration or aspiration is  witnessed. I was present for the entirety of the exam. Exam also will be  reported with recommendations to be made separately by the speech  pathologist (see report).     FLUOROSCOPY TIME: 40 seconds, 1161 images.     This report was finalized on 3/11/2022 3:59 PM by Dr. Wesley Marcelo M.D.       CT Head Without Contrast [[id number]] Collected: 03/09/22 0132     Updated: 03/09/22 0140    Narrative:      CT HEAD WITHOUT CONTRAST     HISTORY: Seizure     COMPARISON: 10/10/2021     TECHNIQUE: Axial CT imaging was obtained through the brain. No IV  contrast was administered.     FINDINGS:  No acute intracranial hemorrhage is seen. There is atrophy. There is  periventricular and deep white matter microangiopathic change. There is  no midline shift or mass effect. No calvarial fracture is seen. There is  extensive opacification of the ethmoid sinuses. Mucosal thickening and  mucous retention cysts are noted within the maxillary sinuses  bilaterally. There are inspissated secretions which are noted within the  left sphenoid sinus, as well as mucosal thickening within the right  sphenoid sinus. There is mild stable opacification of the left mastoid  air cells.       Impression:         1. No acute intracranial findings.  2. Sinus inflammatory changes.     Radiation dose reduction techniques were utilized, including automated  exposure control and exposure modulation based on body size.     This report was finalized on 3/9/2022 1:37 AM by Dr. Brianna Greco M.D.       XR Chest 1 View [[id number]] Collected: 03/09/22 0039     Updated: 03/09/22 0043    Narrative:      SINGLE VIEW OF THE CHEST     HISTORY: Intubation     COMPARISON: 10/10/2021     FINDINGS:  Endotracheal tube  terminates in satisfactory position. Cardiac  silhouette is stable when compared to prior imaging. It does appear to  be some right basilar infiltrate. There is left basilar consolidation,  and overall volume loss within the left hemithorax, although this is a  stable finding when compared to prior a.m. No pneumothorax is seen.  There is severe lumbar scoliosis.       Impression:      Endotracheal tube terminates in satisfactory position.     This report was finalized on 3/9/2022 12:40 AM by Dr. Brianna Greco M.D.             I reviewed the patient's new clinical results.  I personally viewed and interpreted the patient's imaging results:        Medication Review:   castor oil-balsam peru, 1 application, Topical, Q12H  enoxaparin, 40 mg, Subcutaneous, Q24H  hydrocortisone-bacitracin-zinc oxide-nystatin, 1 application, Topical, TID  lamoTRIgine, 100 mg, Oral, BID  levETIRAcetam, 1,250 mg, Intravenous, Q12H  levothyroxine, 125 mcg, Oral, Q AM  pantoprazole, 40 mg, Intravenous, Q24H  sodium chloride, 10 mL, Intravenous, Q12H        dextrose 5 % and sodium chloride 0.45 % with KCl 20 mEq/L, 75 mL/hr, Last Rate: 75 mL/hr (03/13/22 1333)        ASSESSMENT:   1. Seizure, controlled on Keppra and Lamictal  2. Mechanical ventilation at 1 point, currently doing well and extubated and is on nasal cannula oxygen 2 L/min  3. Lactic acidosis secondary to seizure, resolved  4. Electrode abnormalities, corrected  5. Acid reflux on Pepcid  6. Hypothyroidism on hormone replacement  7. Cerebral palsy  8. Positive blood culture, likely contaminant, on no antibiotics with coagulase-negative staph on the final ID  9. Dysphagia, currently on p.o. diet with recommendation for purée and thin liquids, per speech therapy they believe patient is at baseline       PLAN:    Slightly modified regimen of antiepileptic  Patient is cleared for discharge, awaiting bed at the facility  No change in his oxygen requirement still on 2 L/min nasal  cannula and no change in status overall.  Discussed with     Disposition:     Sarika Donahue MD  03/13/22  14:50 EDT             Dictated utilizing Dragon dictation

## 2022-03-14 LAB
ANION GAP SERPL CALCULATED.3IONS-SCNC: 8.5 MMOL/L (ref 5–15)
BACTERIA SPEC AEROBE CULT: NORMAL
BASOPHILS # BLD AUTO: 0.06 10*3/MM3 (ref 0–0.2)
BASOPHILS NFR BLD AUTO: 0.9 % (ref 0–1.5)
BUN SERPL-MCNC: <2 MG/DL (ref 8–23)
BUN/CREAT SERPL: ABNORMAL
CALCIUM SPEC-SCNC: 8.7 MG/DL (ref 8.6–10.5)
CHLORIDE SERPL-SCNC: 104 MMOL/L (ref 98–107)
CO2 SERPL-SCNC: 25.5 MMOL/L (ref 22–29)
CREAT SERPL-MCNC: 0.49 MG/DL (ref 0.76–1.27)
DEPRECATED RDW RBC AUTO: 50.2 FL (ref 37–54)
EGFRCR SERPLBLD CKD-EPI 2021: 113.9 ML/MIN/1.73
EOSINOPHIL # BLD AUTO: 0.37 10*3/MM3 (ref 0–0.4)
EOSINOPHIL NFR BLD AUTO: 5.5 % (ref 0.3–6.2)
ERYTHROCYTE [DISTWIDTH] IN BLOOD BY AUTOMATED COUNT: 14.9 % (ref 12.3–15.4)
GLUCOSE SERPL-MCNC: 95 MG/DL (ref 65–99)
HCT VFR BLD AUTO: 35.6 % (ref 37.5–51)
HGB BLD-MCNC: 11.9 G/DL (ref 13–17.7)
IMM GRANULOCYTES # BLD AUTO: 0.04 10*3/MM3 (ref 0–0.05)
IMM GRANULOCYTES NFR BLD AUTO: 0.6 % (ref 0–0.5)
LYMPHOCYTES # BLD AUTO: 1.73 10*3/MM3 (ref 0.7–3.1)
LYMPHOCYTES NFR BLD AUTO: 25.9 % (ref 19.6–45.3)
MCH RBC QN AUTO: 31.1 PG (ref 26.6–33)
MCHC RBC AUTO-ENTMCNC: 33.4 G/DL (ref 31.5–35.7)
MCV RBC AUTO: 93 FL (ref 79–97)
MONOCYTES # BLD AUTO: 0.75 10*3/MM3 (ref 0.1–0.9)
MONOCYTES NFR BLD AUTO: 11.2 % (ref 5–12)
NEUTROPHILS NFR BLD AUTO: 3.74 10*3/MM3 (ref 1.7–7)
NEUTROPHILS NFR BLD AUTO: 55.9 % (ref 42.7–76)
NRBC BLD AUTO-RTO: 0 /100 WBC (ref 0–0.2)
PLATELET # BLD AUTO: 191 10*3/MM3 (ref 140–450)
PMV BLD AUTO: 10.8 FL (ref 6–12)
POTASSIUM SERPL-SCNC: 3.2 MMOL/L (ref 3.5–5.2)
RBC # BLD AUTO: 3.83 10*6/MM3 (ref 4.14–5.8)
SARS-COV-2 RNA RESP QL NAA+PROBE: NOT DETECTED
SODIUM SERPL-SCNC: 138 MMOL/L (ref 136–145)
WBC NRBC COR # BLD: 6.69 10*3/MM3 (ref 3.4–10.8)

## 2022-03-14 PROCEDURE — U0005 INFEC AGEN DETEC AMPLI PROBE: HCPCS | Performed by: INTERNAL MEDICINE

## 2022-03-14 PROCEDURE — 85025 COMPLETE CBC W/AUTO DIFF WBC: CPT | Performed by: INTERNAL MEDICINE

## 2022-03-14 PROCEDURE — 0 POTASSIUM CHLORIDE 10 MEQ/100ML SOLUTION: Performed by: INTERNAL MEDICINE

## 2022-03-14 PROCEDURE — 25010000002 ENOXAPARIN PER 10 MG: Performed by: INTERNAL MEDICINE

## 2022-03-14 PROCEDURE — U0003 INFECTIOUS AGENT DETECTION BY NUCLEIC ACID (DNA OR RNA); SEVERE ACUTE RESPIRATORY SYNDROME CORONAVIRUS 2 (SARS-COV-2) (CORONAVIRUS DISEASE [COVID-19]), AMPLIFIED PROBE TECHNIQUE, MAKING USE OF HIGH THROUGHPUT TECHNOLOGIES AS DESCRIBED BY CMS-2020-01-R: HCPCS | Performed by: INTERNAL MEDICINE

## 2022-03-14 PROCEDURE — 36415 COLL VENOUS BLD VENIPUNCTURE: CPT | Performed by: INTERNAL MEDICINE

## 2022-03-14 PROCEDURE — 80048 BASIC METABOLIC PNL TOTAL CA: CPT | Performed by: INTERNAL MEDICINE

## 2022-03-14 PROCEDURE — 25010000002 LEVETRIRACETAM PER 10 MG: Performed by: PSYCHIATRY & NEUROLOGY

## 2022-03-14 RX ORDER — LEVETIRACETAM 1000 MG/1
1000 TABLET ORAL 2 TIMES DAILY
Qty: 60 TABLET | Refills: 1 | Status: SHIPPED | OUTPATIENT
Start: 2022-03-14

## 2022-03-14 RX ORDER — LEVETIRACETAM 250 MG/1
250 TABLET ORAL 2 TIMES DAILY
Qty: 60 TABLET | Refills: 1 | Status: SHIPPED | OUTPATIENT
Start: 2022-03-14

## 2022-03-14 RX ADMIN — POTASSIUM CHLORIDE 10 MEQ: 7.46 INJECTION, SOLUTION INTRAVENOUS at 11:00

## 2022-03-14 RX ADMIN — CASTOR OIL AND BALSAM, PERU 1 APPLICATION: 788; 87 OINTMENT TOPICAL at 20:10

## 2022-03-14 RX ADMIN — POTASSIUM CHLORIDE, DEXTROSE MONOHYDRATE AND SODIUM CHLORIDE 75 ML/HR: 150; 5; 450 INJECTION, SOLUTION INTRAVENOUS at 06:05

## 2022-03-14 RX ADMIN — LEVETIRACETAM 1250 MG: 100 INJECTION, SOLUTION, CONCENTRATE INTRAVENOUS at 08:07

## 2022-03-14 RX ADMIN — POTASSIUM CHLORIDE 10 MEQ: 7.46 INJECTION, SOLUTION INTRAVENOUS at 08:34

## 2022-03-14 RX ADMIN — ZINC OXIDE 1 APPLICATION: 200 OINTMENT TOPICAL at 20:10

## 2022-03-14 RX ADMIN — LAMOTRIGINE 100 MG: 100 TABLET ORAL at 08:07

## 2022-03-14 RX ADMIN — POTASSIUM CHLORIDE 10 MEQ: 7.46 INJECTION, SOLUTION INTRAVENOUS at 13:27

## 2022-03-14 RX ADMIN — Medication 10 ML: at 08:07

## 2022-03-14 RX ADMIN — CASTOR OIL AND BALSAM, PERU 1 APPLICATION: 788; 87 OINTMENT TOPICAL at 08:07

## 2022-03-14 RX ADMIN — LAMOTRIGINE 100 MG: 100 TABLET ORAL at 20:10

## 2022-03-14 RX ADMIN — PANTOPRAZOLE SODIUM 40 MG: 40 INJECTION, POWDER, LYOPHILIZED, FOR SOLUTION INTRAVENOUS at 11:49

## 2022-03-14 RX ADMIN — LEVETIRACETAM 1250 MG: 100 INJECTION, SOLUTION, CONCENTRATE INTRAVENOUS at 20:10

## 2022-03-14 RX ADMIN — ENOXAPARIN SODIUM 40 MG: 100 INJECTION SUBCUTANEOUS at 08:07

## 2022-03-14 RX ADMIN — POTASSIUM CHLORIDE 10 MEQ: 7.46 INJECTION, SOLUTION INTRAVENOUS at 15:54

## 2022-03-14 RX ADMIN — Medication 10 ML: at 20:10

## 2022-03-14 RX ADMIN — ZINC OXIDE 1 APPLICATION: 200 OINTMENT TOPICAL at 15:54

## 2022-03-14 RX ADMIN — LEVOTHYROXINE SODIUM 125 MCG: 0.12 TABLET ORAL at 06:04

## 2022-03-14 NOTE — PLAN OF CARE
Goal Outcome Evaluation:      Patient is resting abed and tolerating turning and repositioning for off loading of pressure areas well. He is more alert today than yesterday but still requiring a lot of coaxing to get him to take PO intake or medications, and drinks. Lungs are diminished throughout and he has an intermittent, non-productive cough. Abdomen is large, with + BS in all quadrants. He is incontinent of both bowel and bladder, and wearing male wraps with briefs. Moisture barrier applied to his buttocks to prevent breakdown. Skin Is intact at this time without any open areas.

## 2022-03-14 NOTE — PLAN OF CARE
Goal Outcome Evaluation:  Plan of Care Reviewed With: patient, guardian           [facility] repMajor is suppose to call this nurse back regarding oxygen and meds and might not call back until it too late to pick him up.  It is possible pt will be here in the AM.  CCP has also spoken to [place] regarding pt to be DC'd back to group home.  Waiting for phone call.  Will cont to monitor.

## 2022-03-14 NOTE — CASE MANAGEMENT/SOCIAL WORK
Continued Stay Note  [facility]     Patient Name: Ned Basilio  MRN: [id number]  Today's Date: 3/14/2022    Admit Date: 3/8/2022     Discharge Plan     Row Name 03/14/22 1713       Plan    Plan DC to Group Home- hopefully Tuesday.  Facility to provide transportation    Patient/Family in Agreement with Plan yes    Plan Comments Nursing spoke with Major from the Group [facility] and he wants clarification on several meds as well as a negative COVID test.  Nurse to reach out to MD for orders.  Major also stated that once everything is addressed, they will transport tomorrow afternoon. Gabriela Palacio RN               Discharge Codes    No documentation.               Expected Discharge Date and Time     Expected Discharge Date Expected Discharge Time    Mar 14, 2022             Gabriela Palacio RN

## 2022-03-14 NOTE — DISCHARGE SUMMARY
DISCHARGE SUMMARY    Patient Name: Ned Basilio  Age/Sex: 66 y.o. male  : 1955  MRN: [id number]  Patient Care Team:  Seth Villanueva MD as PCP - General (Internal Medicine)  Cedric Kwok MD as Consulting Physician (Gastroenterology)       Date of Admit: 3/8/2022  Date of Discharge:  22  Discharge Condition: Good    Discharge Diagnoses:  1. Seizure, controlled on Keppra and Lamictal  2. Mechanical ventilation at 1 point, currently doing well and extubated and is on nasal cannula oxygen 2 L/min  3. Lactic acidosis secondary to seizure, resolved  4. Electrode abnormalities, corrected  5. Acid reflux on Pepcid  6. Hypothyroidism on hormone replacement  7. Cerebral palsy  8. Positive blood culture, likely contaminant, on no antibiotics with coagulase-negative staph on the final ID  9. Dysphagia, currently on p.o. diet with recommendation for purée and thin liquids, per speech therapy they believe patient is at baseline      History of present illness from H&P from 3/8/2022:   This is a 66-year-old male patient, resident of a group home, with history of seizure on Keppra and Lamictal.  No reports of recent seizure..  His baseline mental status is unknown.     He was getting a bowel prep today and then had Generalized tonico clonic seizure which continued up until her got to the ED. He received 10 mg intranasal Versed in route by EMS with no relief.      Due to seizure and altered mental status, he was intubated in the ED. He received succinylcholine 100 mg and etomidate 20 mg for intubation.. He also received Keppra 1 g.  Seizure stopped afterward.     He is currently on mechanical ventilation with AC VC: 16/550/60 %/7.5.  He is sedated with low-dose propofol.  He does not respond to verbal stimuli but withdraws extremities slightly to pain.    Hospital Course:   Ned Basilio presented to [facility] with complaints of new onset seizure, he was already on Keppra and  Lamictal, patient was controlled in the hospital with initial use of benzodiazepine, patient was seen in consultation by neurology, he was modified in his regimen by increasing the dose of the Keppra and he was cleared for discharge afterwards.  Patient needed to be intubated for airway protection and was weaned off the ventilator, he is still on low amount of oxygen at 2 L/min which can be gradually weaned and hopefully discontinued.  Patient should be cleared for discharge from the medical standpoint, he was cleared over the week end and the bed became available this morning   He had some dysphagia, was evaluated by speech therapy, they feel that this is his baseline given his cerebral palsy and the patient was cleared to discharge home on thin liquids with some recommendation regarding the diet consistency as clearly stated on their consult note.  Below is there are reported finding and recommendation:  Outcome Evaluation: VFSS completed-  Suspect patient's swallow is at baseline. Recommend pureed diet/thin liquids (which is his baseline diet). Meds whole or crushed PRN w/puree. 1:1 assist with PO, small bites and drinks via straw. Upright for any PO intake. SLP to follow for diet tolerance x1 and s/o if no concerns.       Consults:   IP CONSULT TO PULMONOLOGY  IP CONSULT TO NEUROLOGY    Significant Discharge Diagnostics   Procedures Performed:         Pertinent Lab Results:  Results from last 7 days   Lab Units 03/14/22  0607 03/13/22  0800 03/12/22  0747 03/11/22  2143 03/11/22  0312 03/10/22  0339 03/08/22  2347   SODIUM mmol/L 138 138 139  --  144 142 135*   POTASSIUM mmol/L 3.2* 3.3* 3.6 3.0* 2.9* 3.1* 3.2*   CHLORIDE mmol/L 104 106 108*  --  110* 109* 99   CO2 mmol/L 25.5 22.6 20.0*  --  20.0* 22.0 16.1*   BUN mg/dL <2* 2* 3*  --  7* 7* 4*   CREATININE mg/dL 0.49* 0.54* 0.57*  --  0.60* 0.86 0.70*   GLUCOSE mg/dL 95 98 95  --  59* 77 121*   CALCIUM mg/dL 8.7 8.6 8.3*  --  8.8 8.4* 9.5   AST (SGOT) U/L  --    --   --   --   --   --  30   ALT (SGPT) U/L  --   --   --   --   --   --  26     Results from last 7 days   Lab Units 03/08/22  2347   TROPONIN T ng/mL <0.010     Results from last 7 days   Lab Units 03/14/22  0607 03/13/22  0800 03/12/22  0747 03/11/22  0312 03/10/22  0339 03/08/22  2347   WBC 10*3/mm3 6.69 6.86 7.76 11.61* 12.35* 10.94*   HEMOGLOBIN g/dL 11.9* 11.8* 11.3* 12.3* 13.3 15.1   HEMATOCRIT % 35.6* 34.7* 34.6* 37.0* 39.6 44.8   PLATELETS 10*3/mm3 191 182 180 203 209 266   MCV fL 93.0 91.3 94.3 92.3 92.1 92.2   MCH pg 31.1 31.1 30.8 30.7 30.9 31.1   MCHC g/dL 33.4 34.0 32.7 33.2 33.6 33.7   RDW % 14.9 14.7 15.1 15.3 15.1 14.6   RDW-SD fl 50.2 48.7 52.7 51.7 50.6 49.0   MPV fL 10.8 10.5 11.2 10.6 10.8 10.4   NEUTROPHIL % % 55.9 65.6 73.3 81.3* 80.9* 79.3*   LYMPHOCYTE % % 25.9 21.4 16.4* 10.6* 11.5* 13.2*   MONOCYTES % % 11.2 9.5 7.2 6.1 5.7 6.0   EOSINOPHIL % % 5.5 1.9 2.1 1.0 1.1 0.4   BASOPHIL % % 0.9 0.9 0.6 0.6 0.6 0.5   IMM GRAN % % 0.6* 0.7* 0.4 0.4 0.2 0.6*   NEUTROS ABS 10*3/mm3 3.74 4.50 5.69 9.43* 9.98* 8.68*   LYMPHS ABS 10*3/mm3 1.73 1.47 1.27 1.23 1.42 1.44   MONOS ABS 10*3/mm3 0.75 0.65 0.56 0.71 0.70 0.66   EOS ABS 10*3/mm3 0.37 0.13 0.16 0.12 0.14 0.04   BASOS ABS 10*3/mm3 0.06 0.06 0.05 0.07 0.08 0.05   IMMATURE GRANS (ABS) 10*3/mm3 0.04 0.05 0.03 0.05 0.03 0.07*   NRBC /100 WBC 0.0 0.0 0.0 0.0 0.0 0.0     Results from last 7 days   Lab Units 03/08/22  2347   INR  1.03   APTT seconds 28.0     Results from last 7 days   Lab Units 03/11/22  0312   MAGNESIUM mg/dL 1.9     Results from last 7 days   Lab Units 03/09/22  1830   TRIGLYCERIDES mg/dL 68         Results from last 7 days   Lab Units 03/08/22  2347   TSH uIU/mL 3.200     Results from last 7 days   Lab Units 03/09/22  0159   PH, ARTERIAL pH units 7.349*   PCO2, ARTERIAL mm Hg 42.1   PO2 ART mm Hg 238.6*   HCO3 ART mmol/L 23.2     Results from last 7 days   Lab Units 03/10/22  0339 03/09/22  0511 03/08/22  2347   PROCALCITONIN ng/mL   --   --  0.04   LACTATE mmol/L 1.1 4.3* 8.6*             Results from last 7 days   Lab Units 03/11/22  2143 03/09/22  0511 03/09/22  0437   BLOODCX  No growth at 2 days  No growth at 2 days No growth at 5 days  Staphylococcus, coagulase negative*  --    RESPCX   --   --  Scant growth (1+) Normal respiratory qasim. No S. aureus or Pseudomonas aeruginosa detected. Final report.   BCIDPCR   --  Staph spp, not aureus or lugdunesis. Identification by BCID2 PCR.*  --                    Imaging Results:  Imaging Results (All)     Procedure Component Value Units Date/Time    FL Video Swallow With Speech Single Contrast [[id number]] Collected: 03/11/22 1528     Updated: 03/11/22 1602    Narrative:      VIDEO SWALLOWING EXAM     HISTORY: Dysphagia     FINDINGS: There is moderate oral and minimal pharyngeal dysphagia.  Decreased lingual bolus control is present and there is premature  spillage to the piriform sinuses. No penetration or aspiration is  witnessed. I was present for the entirety of the exam. Exam also will be  reported with recommendations to be made separately by the speech  pathologist (see report).     FLUOROSCOPY TIME: 40 seconds, 1161 images.     This report was finalized on 3/11/2022 3:59 PM by Dr. Wesley Marcelo M.D.       CT Head Without Contrast [[id number]] Collected: 03/09/22 0132     Updated: 03/09/22 0140    Narrative:      CT HEAD WITHOUT CONTRAST     HISTORY: Seizure     COMPARISON: 10/10/2021     TECHNIQUE: Axial CT imaging was obtained through the brain. No IV  contrast was administered.     FINDINGS:  No acute intracranial hemorrhage is seen. There is atrophy. There is  periventricular and deep white matter microangiopathic change. There is  no midline shift or mass effect. No calvarial fracture is seen. There is  extensive opacification of the ethmoid sinuses. Mucosal thickening and  mucous retention cysts are noted within the maxillary sinuses  bilaterally. There are inspissated secretions  which are noted within the  left sphenoid sinus, as well as mucosal thickening within the right  sphenoid sinus. There is mild stable opacification of the left mastoid  air cells.       Impression:         1. No acute intracranial findings.  2. Sinus inflammatory changes.     Radiation dose reduction techniques were utilized, including automated  exposure control and exposure modulation based on body size.     This report was finalized on 3/9/2022 1:37 AM by Dr. Brianna Greco M.D.       XR Chest 1 View [[id number]] Collected: 03/09/22 0039     Updated: 03/09/22 0043    Narrative:      SINGLE VIEW OF THE CHEST     HISTORY: Intubation     COMPARISON: 10/10/2021     FINDINGS:  Endotracheal tube terminates in satisfactory position. Cardiac  silhouette is stable when compared to prior imaging. It does appear to  be some right basilar infiltrate. There is left basilar consolidation,  and overall volume loss within the left hemithorax, although this is a  stable finding when compared to prior a.m. No pneumothorax is seen.  There is severe lumbar scoliosis.       Impression:      Endotracheal tube terminates in satisfactory position.     This report was finalized on 3/9/2022 12:40 AM by Dr. Brianna Greco M.D.             Objective:   Temp:  [98.2 °F (36.8 °C)-99 °F (37.2 °C)] 99 °F (37.2 °C)  Heart Rate:  [] 92  Resp:  [17-20] 20  BP: (139-154)/(69-93) 149/74   SpO2:  [90 %-93 %] 90 %  on  Flow (L/min):  [2] 2 Device (Oxygen Therapy): nasal cannula  No intake or output data in the 24 hours ending 03/14/22 0910  Body mass index is 35.09 kg/m².      03/12/22  0343 03/13/22  0510 03/14/22  0405   Weight: 81.1 kg (178 lb 12.7 oz) 81.4 kg (179 lb 7.3 oz) 81.5 kg (179 lb 10.8 oz)     Weight change: 0.1 kg (3.5 oz)    Physical Exam:      General: Arousable, may respond to physical stimulation, did not follow commands, in no distress, on nasal cannula oxygen         HEENT:  No oral lesions. No thrush. Oral mucosa  moist.  Large tongue, short obese neck   Chest Wall:  No abnormalities observed.             Neck:  Trachea midline. No JVD.   Pulmonary:  CTAB.  Minimal rhonchi, no crackles. Respirations regular, even and unlabored.          Cardio:  Regular rhythm and normal rate. Normal S1 and S2. No murmur, gallop, rub or click.    Abdominal:  Soft, non-tender and non-distended. Normal bowel sounds. No masses. No organomegaly. No guarding. No rebound tenderness.  Truncal obesity  Extremities:  Restricted movement, trace edema.    Discharge Medications and Instructions:     Discharge Medications     Discharge Medications      New Medications      Instructions Start Date   levETIRAcetam 100 MG/ML solution  Commonly known as: Keppra  Replaces: levETIRAcetam 1000 MG tablet   1,250 mg, Oral, 2 Times Daily         Continue These Medications      Instructions Start Date   albuterol (2.5 MG/3ML) 0.083% nebulizer solution  Commonly known as: PROVENTIL   2.5 mg, Nebulization, Every 4 Hours PRN      aspirin 81 MG EC tablet   81 mg, Oral, Daily      CALCIUM CITRATE + D PO   1 tablet, Oral, Daily      chlorhexidine 0.12 % solution  Commonly known as: PERIDEX   No dose, route, or frequency recorded.      dantrolene 25 MG capsule  Commonly known as: DANTRIUM   25 mg, Oral, Daily      Desitin 13 % cream cream  Generic drug: zinc oxide   No dose, route, or frequency recorded.      ezetimibe 10 MG tablet  Commonly known as: ZETIA   10 mg, Oral, Daily      fluticasone 50 MCG/ACT nasal spray  Commonly known as: FLONASE   No dose, route, or frequency recorded.      HYDROcodone-acetaminophen 7.5-325 MG per tablet  Commonly known as: NORCO   Take 1-2 tabs po q 4 hours prn pain      isosorbide dinitrate 20 MG tablet  Commonly known as: ISORDIL   20 mg, Oral, Every 12 Hours      lamoTRIgine 100 MG tablet  Commonly known as: LaMICtal   100 mg, Oral, 2 Times Daily      Laxative 10 MG suppository  Generic drug: bisacodyl   10 mg, Rectal, Daily       levothyroxine 125 MCG tablet  Commonly known as: SYNTHROID, LEVOTHROID   125 mcg, Oral, Daily      linaclotide 145 MCG capsule capsule  Commonly known as: Linzess   145 mcg, Oral, Every Morning Before Breakfast      metoprolol tartrate 25 MG tablet  Commonly known as: LOPRESSOR   25 mg, Oral, 2 Times Daily      Mi-Acid Gas Relief 80 MG chewable tablet  Generic drug: simethicone   CHEW 2 TABLETS 2 (TWO) TIMES A DAY FOR 30 DAYS.      miconazole 2 % powder  Commonly known as: MICOTIN   Topical, 2 Times Daily, Apply powder to intertriginous rash two times daily       montelukast 10 MG tablet  Commonly known as: SINGULAIR   10 mg, Oral, Nightly      Nasonex 50 MCG/ACT nasal spray  Generic drug: mometasone   2 sprays, Nasal, Daily      omeprazole 40 MG capsule  Commonly known as: priLOSEC   40 mg, Oral, Every 12 Hours      polyethylene glycol 17 g packet  Commonly known as: MIRALAX   17 g, Oral, Daily      senna 8.6 MG tablet  Commonly known as: SENOKOT   3 tablets, Oral, 2 Times Daily      silver sulfadiazine 1 % cream  Commonly known as: SILVADENE, SSD   No dose, route, or frequency recorded.      TAB-A-KATE/IRON PO   1 tablet, Oral, Daily      Vitamin D-3 25 MCG (1000 UT) capsule   1,000 Units, Oral, 2 Times Daily         Stop These Medications    baclofen 10 MG tablet  Commonly known as: LIORESAL     levETIRAcetam 1000 MG tablet  Commonly known as: KEPPRA  Replaced by: levETIRAcetam 100 MG/ML solution     multivitamin with minerals tablet tablet     vitamin D 1.25 MG (31985 UT) capsule capsule  Commonly known as: ERGOCALCIFEROL            Discharge Diet:    Dietary Orders (From admission, onward)     Start     Ordered    03/11/22 1256  Diet Dysphagia; II - Pureed; Thin  Diet Effective Now        Question Answer Comment   Diet Texture / Consistency Dysphagia    Select Dysphagia level: II - Pureed    Fluid Consistency: Thin        03/11/22 1255                Activity at Discharge:   As tolerated    Discharge  disposition: Skilled nursing facility/group home    Discharge Instructions and Follow ups:      Follow-up Information     Seth Villanueva MD .    Specialty: Internal Medicine  Contact information:  [address] [place], [place] [id number] [phone]                       No future appointments.     Medication Reconciliation: Please see electronically completed Med Rec.    Total time spent discharging patient including evaluation, medication reconciliation, arranging follow up, and post hospitalization instructions and education total time exceeds 30 minutes.     Sarika Donahue MD  03/14/22  09:10 EDT      Dictated utilizing Dragon dictation

## 2022-03-14 NOTE — PLAN OF CARE
Goal Outcome Evaluation:  Plan of Care Reviewed With: patient, guardian           Outcome Evaluation: Pt to be DC'd Monday morning with transport from Group [place].  IV's to be taken out and bath given this AM.  Morning meds given and magic barrier cream applied to bottom.

## 2022-03-14 NOTE — CASE MANAGEMENT/SOCIAL WORK
Continued Stay Note  [facility]     Patient Name: Ned Basilio  MRN: [id number]  Today's Date: 3/14/2022    Admit Date: 3/8/2022     Discharge Plan     Row Name 03/14/22 1124       Plan    Plan Comments Spoke with Major ( [phone]).  DC summary and AVS faxed to him [phone].  Once he receives it, he will pass it on their nurse who must review it and approve the patient returning.  Gabriela Palacio RN               Discharge Codes    No documentation.               Expected Discharge Date and Time     Expected Discharge Date Expected Discharge Time    Mar 12, 2022             Gabriela Palacio RN

## 2022-03-14 NOTE — PLAN OF CARE
Pt calm unable to participate to follow commands. Nonverbal but with occasional laughing. Attempted to give meds Pt purses lips at times/ several attempts before successful. Pt will track with eyes. Vs stable. NAD.      Problem: Adult Inpatient Plan of Care  Goal: Plan of Care Review  Outcome: Ongoing, Progressing  Goal: Patient-Specific Goal (Individualized)  Outcome: Ongoing, Progressing  Goal: Absence of Hospital-Acquired Illness or Injury  Outcome: Ongoing, Progressing  Intervention: Identify and Manage Fall Risk  Recent Flowsheet Documentation  Taken 3/13/2022 1800 by Alice Carrillo RN  Safety Promotion/Fall Prevention: activity supervised  Taken 3/13/2022 1600 by Alice Carrillo RN  Safety Promotion/Fall Prevention: activity supervised  Taken 3/13/2022 1400 by Alice Carrillo RN  Safety Promotion/Fall Prevention:   activity supervised   assistive device/personal items within reach   clutter free environment maintained   fall prevention program maintained   nonskid shoes/slippers when out of bed   room organization consistent   safety round/check completed  Taken 3/13/2022 1200 by Alice Carrillo RN  Safety Promotion/Fall Prevention:   activity supervised   assistive device/personal items within reach   clutter free environment maintained   fall prevention program maintained   nonskid shoes/slippers when out of bed   room organization consistent   safety round/check completed  Taken 3/13/2022 1000 by Alice Carrillo RN  Safety Promotion/Fall Prevention: activity supervised  Taken 3/13/2022 0925 by Alice Carrillo RN  Safety Promotion/Fall Prevention:   activity supervised   assistive device/personal items within reach   clutter free environment maintained   fall prevention program maintained   nonskid shoes/slippers when out of bed   room organization consistent   safety round/check completed  Intervention: Prevent Skin Injury  Recent Flowsheet Documentation  Taken 3/13/2022 1800 by Alice Carrillo  RN  Body Position: sitting up in bed  Taken 3/13/2022 1600 by Alice Carrillo RN  Body Position: sitting up in bed  Taken 3/13/2022 1400 by Alice Carrillo RN  Body Position: sitting up in bed  Skin Protection: adhesive use limited  Taken 3/13/2022 1200 by Alice Carrillo RN  Body Position:   tilted   right  Taken 3/13/2022 1000 by Alice Carrillo RN  Body Position:   tilted   left  Taken 3/13/2022 0925 by Alice Carrillo RN  Body Position: supine  Skin Protection: adhesive use limited  Intervention: Prevent and Manage VTE (Venous Thromboembolism) Risk  Recent Flowsheet Documentation  Taken 3/13/2022 1800 by Alice Carrillo RN  Activity Management: activity adjusted per tolerance  Taken 3/13/2022 1600 by Alice Carrillo RN  Activity Management: activity adjusted per tolerance  Taken 3/13/2022 1400 by Alice Carrillo RN  Activity Management: activity adjusted per tolerance  Taken 3/13/2022 1200 by Alice Carrillo RN  Activity Management: activity adjusted per tolerance  Taken 3/13/2022 1000 by Alice Carrillo RN  Activity Management: activity adjusted per tolerance  Taken 3/13/2022 0925 by Alice Carrillo RN  Activity Management: activity adjusted per tolerance  VTE Prevention/Management:   bilateral   dorsiflexion/plantar flexion performed  Intervention: Prevent Infection  Recent Flowsheet Documentation  Taken 3/13/2022 1800 by Alice Carrillo RN  Infection Prevention:   rest/sleep promoted   single patient room provided  Taken 3/13/2022 1600 by Alice Carrillo RN  Infection Prevention:   rest/sleep promoted   single patient room provided  Taken 3/13/2022 1400 by Alice Carrillo RN  Infection Prevention:   rest/sleep promoted   single patient room provided  Taken 3/13/2022 1000 by Alice Carrillo RN  Infection Prevention:   rest/sleep promoted   single patient room provided  Taken 3/13/2022 0925 by Alice Carrillo RN  Infection Prevention:   rest/sleep promoted   single patient room provided  Goal:  Optimal Comfort and Wellbeing  Outcome: Ongoing, Progressing  Intervention: Provide Person-Centered Care  Recent Flowsheet Documentation  Taken 3/13/2022 1400 by Alice Carrillo RN  Trust Relationship/Rapport: care explained  Taken 3/13/2022 0925 by Alice Carrillo RN  Trust Relationship/Rapport:   care explained   thoughts/feelings acknowledged   reassurance provided   questions encouraged   questions answered   emotional support provided  Goal: Readiness for Transition of Care  Outcome: Ongoing, Progressing     Problem: Skin Injury Risk Increased  Goal: Skin Health and Integrity  Outcome: Ongoing, Progressing  Intervention: Optimize Skin Protection  Recent Flowsheet Documentation  Taken 3/13/2022 1800 by Alice Carrillo RN  Head of Bed (HOB) Positioning: HOB elevated  Taken 3/13/2022 1600 by Alice Carrillo RN  Head of Bed (HOB) Positioning: HOB at 30-45 degrees  Taken 3/13/2022 1400 by Alice Carrillo RN  Pressure Reduction Techniques: weight shift assistance provided  Head of Bed (HOB) Positioning: HOB at 45 degrees  Pressure Reduction Devices: pressure-redistributing mattress utilized  Skin Protection: adhesive use limited  Taken 3/13/2022 1200 by Alice Carrillo RN  Head of Bed (HOB) Positioning: HOB elevated  Taken 3/13/2022 1000 by Alice Carrillo RN  Head of Bed (HOB) Positioning: HOB elevated  Taken 3/13/2022 0925 by Alice Carrillo RN  Pressure Reduction Techniques: weight shift assistance provided  Head of Bed (HOB) Positioning: HOB at 30 degrees  Pressure Reduction Devices: pressure-redistributing mattress utilized  Skin Protection: adhesive use limited     Problem: Fall Injury Risk  Goal: Absence of Fall and Fall-Related Injury  Outcome: Ongoing, Progressing  Intervention: Identify and Manage Contributors  Recent Flowsheet Documentation  Taken 3/13/2022 1800 by Alice Carrillo RN  Medication Review/Management: medications reviewed  Taken 3/13/2022 1600 by Alice Carrillo RN  Medication  Review/Management: medications reviewed  Taken 3/13/2022 1400 by Alice Carrillo RN  Medication Review/Management: medications reviewed  Taken 3/13/2022 1200 by Alice Carrillo RN  Medication Review/Management: medications reviewed  Taken 3/13/2022 1000 by Alice Carrillo RN  Medication Review/Management: medications reviewed  Taken 3/13/2022 0925 by Alice Carrillo RN  Medication Review/Management: medications reviewed  Self-Care Promotion: BADL personal objects within reach  Intervention: Promote Injury-Free Environment  Recent Flowsheet Documentation  Taken 3/13/2022 1800 by Alice Carrillo RN  Safety Promotion/Fall Prevention: activity supervised  Taken 3/13/2022 1600 by Alice Carrillo RN  Safety Promotion/Fall Prevention: activity supervised  Taken 3/13/2022 1400 by Alice Carrillo RN  Safety Promotion/Fall Prevention:   activity supervised   assistive device/personal items within reach   clutter free environment maintained   fall prevention program maintained   nonskid shoes/slippers when out of bed   room organization consistent   safety round/check completed  Taken 3/13/2022 1200 by Alice Carrillo RN  Safety Promotion/Fall Prevention:   activity supervised   assistive device/personal items within reach   clutter free environment maintained   fall prevention program maintained   nonskid shoes/slippers when out of bed   room organization consistent   safety round/check completed  Taken 3/13/2022 1000 by Alice Carrillo RN  Safety Promotion/Fall Prevention: activity supervised  Taken 3/13/2022 0925 by Alice Carrillo RN  Safety Promotion/Fall Prevention:   activity supervised   assistive device/personal items within reach   clutter free environment maintained   fall prevention program maintained   nonskid shoes/slippers when out of bed   room organization consistent   safety round/check completed   Goal Outcome Evaluation:

## 2022-03-15 LAB
ANION GAP SERPL CALCULATED.3IONS-SCNC: 10.8 MMOL/L (ref 5–15)
BASOPHILS # BLD AUTO: 0.04 10*3/MM3 (ref 0–0.2)
BASOPHILS NFR BLD AUTO: 0.5 % (ref 0–1.5)
BUN SERPL-MCNC: 2 MG/DL (ref 8–23)
BUN/CREAT SERPL: 4.1 (ref 7–25)
CALCIUM SPEC-SCNC: 8.7 MG/DL (ref 8.6–10.5)
CHLORIDE SERPL-SCNC: 103 MMOL/L (ref 98–107)
CO2 SERPL-SCNC: 25.2 MMOL/L (ref 22–29)
CREAT SERPL-MCNC: 0.49 MG/DL (ref 0.76–1.27)
DEPRECATED RDW RBC AUTO: 51.9 FL (ref 37–54)
EGFRCR SERPLBLD CKD-EPI 2021: 113.9 ML/MIN/1.73
EOSINOPHIL # BLD AUTO: 0.4 10*3/MM3 (ref 0–0.4)
EOSINOPHIL NFR BLD AUTO: 4.6 % (ref 0.3–6.2)
ERYTHROCYTE [DISTWIDTH] IN BLOOD BY AUTOMATED COUNT: 15.1 % (ref 12.3–15.4)
GLUCOSE SERPL-MCNC: 107 MG/DL (ref 65–99)
HCT VFR BLD AUTO: 36.4 % (ref 37.5–51)
HGB BLD-MCNC: 12.3 G/DL (ref 13–17.7)
IMM GRANULOCYTES # BLD AUTO: 0.07 10*3/MM3 (ref 0–0.05)
IMM GRANULOCYTES NFR BLD AUTO: 0.8 % (ref 0–0.5)
LYMPHOCYTES # BLD AUTO: 1.71 10*3/MM3 (ref 0.7–3.1)
LYMPHOCYTES NFR BLD AUTO: 19.8 % (ref 19.6–45.3)
MCH RBC QN AUTO: 31.9 PG (ref 26.6–33)
MCHC RBC AUTO-ENTMCNC: 33.8 G/DL (ref 31.5–35.7)
MCV RBC AUTO: 94.5 FL (ref 79–97)
MONOCYTES # BLD AUTO: 1.01 10*3/MM3 (ref 0.1–0.9)
MONOCYTES NFR BLD AUTO: 11.7 % (ref 5–12)
NEUTROPHILS NFR BLD AUTO: 5.41 10*3/MM3 (ref 1.7–7)
NEUTROPHILS NFR BLD AUTO: 62.6 % (ref 42.7–76)
NRBC BLD AUTO-RTO: 0 /100 WBC (ref 0–0.2)
PLATELET # BLD AUTO: 192 10*3/MM3 (ref 140–450)
PMV BLD AUTO: 10.9 FL (ref 6–12)
POTASSIUM SERPL-SCNC: 3.6 MMOL/L (ref 3.5–5.2)
RBC # BLD AUTO: 3.85 10*6/MM3 (ref 4.14–5.8)
SODIUM SERPL-SCNC: 139 MMOL/L (ref 136–145)
WBC NRBC COR # BLD: 8.64 10*3/MM3 (ref 3.4–10.8)

## 2022-03-15 PROCEDURE — 80048 BASIC METABOLIC PNL TOTAL CA: CPT | Performed by: INTERNAL MEDICINE

## 2022-03-15 PROCEDURE — 85025 COMPLETE CBC W/AUTO DIFF WBC: CPT | Performed by: INTERNAL MEDICINE

## 2022-03-15 PROCEDURE — 25010000002 ENOXAPARIN PER 10 MG: Performed by: INTERNAL MEDICINE

## 2022-03-15 PROCEDURE — 36415 COLL VENOUS BLD VENIPUNCTURE: CPT | Performed by: INTERNAL MEDICINE

## 2022-03-15 PROCEDURE — 25010000002 LEVETRIRACETAM PER 10 MG: Performed by: PSYCHIATRY & NEUROLOGY

## 2022-03-15 RX ADMIN — CASTOR OIL AND BALSAM, PERU 1 APPLICATION: 788; 87 OINTMENT TOPICAL at 20:10

## 2022-03-15 RX ADMIN — PANTOPRAZOLE SODIUM 40 MG: 40 INJECTION, POWDER, LYOPHILIZED, FOR SOLUTION INTRAVENOUS at 10:21

## 2022-03-15 RX ADMIN — CASTOR OIL AND BALSAM, PERU 1 APPLICATION: 788; 87 OINTMENT TOPICAL at 10:22

## 2022-03-15 RX ADMIN — POTASSIUM CHLORIDE, DEXTROSE MONOHYDRATE AND SODIUM CHLORIDE 75 ML/HR: 150; 5; 450 INJECTION, SOLUTION INTRAVENOUS at 00:00

## 2022-03-15 RX ADMIN — ENOXAPARIN SODIUM 40 MG: 100 INJECTION SUBCUTANEOUS at 10:21

## 2022-03-15 RX ADMIN — Medication 10 ML: at 20:10

## 2022-03-15 RX ADMIN — ZINC OXIDE 1 APPLICATION: 200 OINTMENT TOPICAL at 10:22

## 2022-03-15 RX ADMIN — LAMOTRIGINE 100 MG: 100 TABLET ORAL at 10:21

## 2022-03-15 RX ADMIN — POTASSIUM CHLORIDE, DEXTROSE MONOHYDRATE AND SODIUM CHLORIDE 75 ML/HR: 150; 5; 450 INJECTION, SOLUTION INTRAVENOUS at 14:00

## 2022-03-15 RX ADMIN — LEVETIRACETAM 1250 MG: 100 INJECTION, SOLUTION, CONCENTRATE INTRAVENOUS at 10:21

## 2022-03-15 RX ADMIN — LAMOTRIGINE 100 MG: 100 TABLET ORAL at 20:10

## 2022-03-15 RX ADMIN — ZINC OXIDE 1 APPLICATION: 200 OINTMENT TOPICAL at 20:10

## 2022-03-15 RX ADMIN — LEVETIRACETAM 1250 MG: 100 INJECTION, SOLUTION, CONCENTRATE INTRAVENOUS at 20:10

## 2022-03-15 RX ADMIN — LEVOTHYROXINE SODIUM 125 MCG: 0.12 TABLET ORAL at 06:37

## 2022-03-15 RX ADMIN — ZINC OXIDE 1 APPLICATION: 200 OINTMENT TOPICAL at 16:10

## 2022-03-15 RX ADMIN — Medication 10 ML: at 10:22

## 2022-03-15 NOTE — CASE MANAGEMENT/SOCIAL WORK
Continued Stay Note  [facility]     Patient Name: Ned Basilio  MRN: [id number]  Today's Date: 3/15/2022    Admit Date: 3/8/2022     Discharge Plan     Row Name 03/15/22 1655       Plan    Plan Return to group home via [facility] scheduled tonight at 7:30PM. Will need dose of keppra before leaving and one for morning    Patient/Family in Agreement with Plan yes    Plan Comments CSW left [facility] for Group home contact [facility] to confirm they are picking patient up today x2. [facility] manager Maryjane GODWIN notified of d/c hold up. [facility] manager Maryjane GODWIN called and spoke with another  Elena Rebolledo ([phone]). Group home stated they can accept tonight if we provide transportation and provide dose of Keppra tonight and tomorrow. Per pharmacy, we are able to provide dose of Keppra tonight at 7:00PM but unable to provide tablet dose for tomorrow due to already sending the medication to the group home pharmacy. Elena Rebolledo checking with pharmacy to see if they are able to provide dose of medication. In meantime, RN checking to see if we can get prescription for one dose of Keppra filled here. [facility] scheduled for tonight at 7:30PM. Per group home contact ABRAHAM Parker does not need to call report, she acknowledged AVS was faxed to [facility] today, she confirmed patient can return tonight at 7:30PM. BRADFORD Ortiz               Discharge Codes    No documentation.               Expected Discharge Date and Time     Expected Discharge Date Expected Discharge Time    Mar 15, 2022             BRADFORD FLORES

## 2022-03-15 NOTE — CASE MANAGEMENT/SOCIAL WORK
Continued Stay Note  [facility]     Patient Name: Ned Basilio  MRN: [id number]  Today's Date: 3/15/2022    Admit Date: 3/8/2022     Discharge Plan     Row Name 03/15/22 0935       Plan    Plan Return to group home. Facility will be providing transport    Patient/Family in Agreement with Plan yes    Plan Comments CSW spoke to patient’s group home contact [facility] ([phone]) and let him know the MD has changed his multivitamin medication back to what he was admitted on. [facility] stated they required a note that says MD changed it back. CSW faxed over AVS and negative covid test results to [facility] ([phone]). Awaiting confirmation / callback from [facility] that group home is coming to pick patient up. BRADFORD Ortiz               Discharge Codes    No documentation.               Expected Discharge Date and Time     Expected Discharge Date Expected Discharge Time    Mar 14, 2022             BRADFORD FLORES

## 2022-03-15 NOTE — PLAN OF CARE
Goal Outcome Evaluation:      Patient is resting well without any s/s of pain or discomfort noted. He is being turned and repositioned every two hours, and skin is clean, dry and intact. Lungs are clear to diminished without coughing or congestion noted, and VSS. He is returned from room air during the day to 2 ltrs. Nasal cannula due to sleeping with his mouth wide open and oxygen saturation goes down to mid 80's.

## 2022-03-15 NOTE — DISCHARGE SUMMARY
DISCHARGE SUMMARY    Patient Name: Ned Basilio  Age/Sex: 66 y.o. male  : 1955  MRN: [id number]  Patient Care Team:  Seth Villanueva MD as PCP - General (Internal Medicine)  Cedric Kwok MD as Consulting Physician (Gastroenterology)       Date of Admit: 3/8/2022  Date of Discharge:  03/15/22  Discharge Condition: Good    Discharge Diagnoses:  1. Seizure, controlled on Keppra and Lamictal  2. Mechanical ventilation at 1 point, currently doing well and extubated and is on nasal cannula oxygen 2 L/min  3. Lactic acidosis secondary to seizure, resolved  4. Electrode abnormalities, corrected  5. Acid reflux on Pepcid  6. Hypothyroidism on hormone replacement  7. Cerebral palsy  8. Positive blood culture, likely contaminant, on no antibiotics with coagulase-negative staph on the final ID  9. Dysphagia, currently on p.o. diet with recommendation for purée and thin liquids, per speech therapy they believe patient is at baseline      History of present illness from H&P from 3/8/2022:   This is a 66-year-old male patient, resident of a group home, with history of seizure on Keppra and Lamictal.  No reports of recent seizure..  His baseline mental status is unknown.     He was getting a bowel prep today and then had Generalized tonico clonic seizure which continued up until her got to the ED. He received 10 mg intranasal Versed in route by EMS with no relief.      Due to seizure and altered mental status, he was intubated in the ED. He received succinylcholine 100 mg and etomidate 20 mg for intubation.. He also received Keppra 1 g.  Seizure stopped afterward.     He is currently on mechanical ventilation with AC VC: 16/550/60 %/7.5.  He is sedated with low-dose propofol.  He does not respond to verbal stimuli but withdraws extremities slightly to pain.    Hospital Course:   Ned Basilio presented to [facility] with complaints of new onset seizure, he was already on Keppra and  Lamictal, patient was controlled in the hospital with initial use of benzodiazepine, patient was seen in consultation by neurology, he was modified in his regimen by increasing the dose of the Keppra and he was cleared for discharge afterwards.  Patient needed to be intubated for airway protection and was weaned off the ventilator, he is still on low amount of oxygen at 2 L/min which can be gradually weaned and hopefully discontinued.  Patient should be cleared for discharge from the medical standpoint, he was cleared over the week end and the bed became available this morning   He had some dysphagia, was evaluated by speech therapy, they feel that this is his baseline given his cerebral palsy and the patient was cleared to discharge home on thin liquids with some recommendation regarding the diet consistency as clearly stated on their consult note.  Below is there are reported finding and recommendation:  Outcome Evaluation: VFSS completed-  Suspect patient's swallow is at baseline. Recommend pureed diet/thin liquids (which is his baseline diet). Meds whole or crushed PRN w/puree. 1:1 assist with PO, small bites and drinks via straw. Upright for any PO intake. SLP to follow for diet tolerance x1 and s/o if no concerns.       Consults:   IP CONSULT TO PULMONOLOGY  IP CONSULT TO NEUROLOGY    Significant Discharge Diagnostics   Procedures Performed:         Pertinent Lab Results:  Results from last 7 days   Lab Units 03/15/22  0811 03/14/22  0607 03/13/22  0800 03/12/22  0747 03/11/22  2143 03/11/22  0312 03/10/22  0339 03/08/22  2347   SODIUM mmol/L 139 138 138 139  --  144 142 135*   POTASSIUM mmol/L 3.6 3.2* 3.3* 3.6 3.0* 2.9* 3.1* 3.2*   CHLORIDE mmol/L 103 104 106 108*  --  110* 109* 99   CO2 mmol/L 25.2 25.5 22.6 20.0*  --  20.0* 22.0 16.1*   BUN mg/dL 2* <2* 2* 3*  --  7* 7* 4*   CREATININE mg/dL 0.49* 0.49* 0.54* 0.57*  --  0.60* 0.86 0.70*   GLUCOSE mg/dL 107* 95 98 95  --  59* 77 121*   CALCIUM mg/dL 8.7 8.7  8.6 8.3*  --  8.8 8.4* 9.5   AST (SGOT) U/L  --   --   --   --   --   --   --  30   ALT (SGPT) U/L  --   --   --   --   --   --   --  26     Results from last 7 days   Lab Units 03/08/22  2347   TROPONIN T ng/mL <0.010     Results from last 7 days   Lab Units 03/15/22  0811 03/14/22  0607 03/13/22  0800 03/12/22  0747 03/11/22  0312 03/10/22  0339 03/08/22  2347   WBC 10*3/mm3 8.64 6.69 6.86 7.76 11.61* 12.35* 10.94*   HEMOGLOBIN g/dL 12.3* 11.9* 11.8* 11.3* 12.3* 13.3 15.1   HEMATOCRIT % 36.4* 35.6* 34.7* 34.6* 37.0* 39.6 44.8   PLATELETS 10*3/mm3 192 191 182 180 203 209 266   MCV fL 94.5 93.0 91.3 94.3 92.3 92.1 92.2   MCH pg 31.9 31.1 31.1 30.8 30.7 30.9 31.1   MCHC g/dL 33.8 33.4 34.0 32.7 33.2 33.6 33.7   RDW % 15.1 14.9 14.7 15.1 15.3 15.1 14.6   RDW-SD fl 51.9 50.2 48.7 52.7 51.7 50.6 49.0   MPV fL 10.9 10.8 10.5 11.2 10.6 10.8 10.4   NEUTROPHIL % % 62.6 55.9 65.6 73.3 81.3* 80.9* 79.3*   LYMPHOCYTE % % 19.8 25.9 21.4 16.4* 10.6* 11.5* 13.2*   MONOCYTES % % 11.7 11.2 9.5 7.2 6.1 5.7 6.0   EOSINOPHIL % % 4.6 5.5 1.9 2.1 1.0 1.1 0.4   BASOPHIL % % 0.5 0.9 0.9 0.6 0.6 0.6 0.5   IMM GRAN % % 0.8* 0.6* 0.7* 0.4 0.4 0.2 0.6*   NEUTROS ABS 10*3/mm3 5.41 3.74 4.50 5.69 9.43* 9.98* 8.68*   LYMPHS ABS 10*3/mm3 1.71 1.73 1.47 1.27 1.23 1.42 1.44   MONOS ABS 10*3/mm3 1.01* 0.75 0.65 0.56 0.71 0.70 0.66   EOS ABS 10*3/mm3 0.40 0.37 0.13 0.16 0.12 0.14 0.04   BASOS ABS 10*3/mm3 0.04 0.06 0.06 0.05 0.07 0.08 0.05   IMMATURE GRANS (ABS) 10*3/mm3 0.07* 0.04 0.05 0.03 0.05 0.03 0.07*   NRBC /100 WBC 0.0 0.0 0.0 0.0 0.0 0.0 0.0     Results from last 7 days   Lab Units 03/08/22  2347   INR  1.03   APTT seconds 28.0     Results from last 7 days   Lab Units 03/11/22  0312   MAGNESIUM mg/dL 1.9     Results from last 7 days   Lab Units 03/09/22  1830   TRIGLYCERIDES mg/dL 68         Results from last 7 days   Lab Units 03/08/22  2347   TSH uIU/mL 3.200     Results from last 7 days   Lab Units 03/09/22  0159   PH, ARTERIAL pH units  7.349*   PCO2, ARTERIAL mm Hg 42.1   PO2 ART mm Hg 238.6*   HCO3 ART mmol/L 23.2     Results from last 7 days   Lab Units 03/10/22  0339 03/09/22  0511 03/08/22  2347   PROCALCITONIN ng/mL  --   --  0.04   LACTATE mmol/L 1.1 4.3* 8.6*             Results from last 7 days   Lab Units 03/11/22  2143 03/09/22  0511 03/09/22  0437   BLOODCX  No growth at 3 days  No growth at 3 days No growth at 5 days  Staphylococcus, coagulase negative*  --    RESPCX   --   --  Scant growth (1+) Normal respiratory qasim. No S. aureus or Pseudomonas aeruginosa detected. Final report.   BCIDPCR   --  Staph spp, not aureus or lugdunesis. Identification by BCID2 PCR.*  --                    Imaging Results:  Imaging Results (All)     Procedure Component Value Units Date/Time    FL Video Swallow With Speech Single Contrast [[id number]] Collected: 03/11/22 1528     Updated: 03/11/22 1602    Narrative:      VIDEO SWALLOWING EXAM     HISTORY: Dysphagia     FINDINGS: There is moderate oral and minimal pharyngeal dysphagia.  Decreased lingual bolus control is present and there is premature  spillage to the piriform sinuses. No penetration or aspiration is  witnessed. I was present for the entirety of the exam. Exam also will be  reported with recommendations to be made separately by the speech  pathologist (see report).     FLUOROSCOPY TIME: 40 seconds, 1161 images.     This report was finalized on 3/11/2022 3:59 PM by Dr. Wesley Marcelo M.D.       CT Head Without Contrast [[id number]] Collected: 03/09/22 0132     Updated: 03/09/22 0140    Narrative:      CT HEAD WITHOUT CONTRAST     HISTORY: Seizure     COMPARISON: 10/10/2021     TECHNIQUE: Axial CT imaging was obtained through the brain. No IV  contrast was administered.     FINDINGS:  No acute intracranial hemorrhage is seen. There is atrophy. There is  periventricular and deep white matter microangiopathic change. There is  no midline shift or mass effect. No calvarial fracture is seen.  There is  extensive opacification of the ethmoid sinuses. Mucosal thickening and  mucous retention cysts are noted within the maxillary sinuses  bilaterally. There are inspissated secretions which are noted within the  left sphenoid sinus, as well as mucosal thickening within the right  sphenoid sinus. There is mild stable opacification of the left mastoid  air cells.       Impression:         1. No acute intracranial findings.  2. Sinus inflammatory changes.     Radiation dose reduction techniques were utilized, including automated  exposure control and exposure modulation based on body size.     This report was finalized on 3/9/2022 1:37 AM by Dr. Brianna Greco M.D.       XR Chest 1 View [[id number]] Collected: 03/09/22 0039     Updated: 03/09/22 0043    Narrative:      SINGLE VIEW OF THE CHEST     HISTORY: Intubation     COMPARISON: 10/10/2021     FINDINGS:  Endotracheal tube terminates in satisfactory position. Cardiac  silhouette is stable when compared to prior imaging. It does appear to  be some right basilar infiltrate. There is left basilar consolidation,  and overall volume loss within the left hemithorax, although this is a  stable finding when compared to prior a.m. No pneumothorax is seen.  There is severe lumbar scoliosis.       Impression:      Endotracheal tube terminates in satisfactory position.     This report was finalized on 3/9/2022 12:40 AM by Dr. Brianna Greco M.D.             Objective:   Temp:  [98.1 °F (36.7 °C)-98.8 °F (37.1 °C)] 98.5 °F (36.9 °C)  Heart Rate:  [80-99] 95  Resp:  [16-18] 16  BP: (126-168)/(69-81) 126/69   SpO2:  [90 %-94 %] 93 %  on  Flow (L/min):  [2.5] 2.5 Device (Oxygen Therapy): room air    Intake/Output Summary (Last 24 hours) at 3/15/2022 1505  Last data filed at 3/14/2022 1800  Gross per 24 hour   Intake 120 ml   Output --   Net 120 ml     Body mass index is 33.84 kg/m².      03/13/22  0510 03/14/22  0405 03/15/22  0356   Weight: 81.4 kg (179 lb 7.3 oz)  81.5 kg (179 lb 10.8 oz) 78.6 kg (173 lb 4.5 oz)     Weight change: -2.9 kg (-6 lb 6.3 oz)    Physical Exam:      General: Arousable, may respond to physical stimulation, did not follow commands, in no distress, on nasal cannula oxygen         HEENT:  No oral lesions. No thrush. Oral mucosa moist.  Large tongue, short obese neck   Chest Wall:  No abnormalities observed.             Neck:  Trachea midline. No JVD.   Pulmonary:  CTAB.  Minimal rhonchi, no crackles. Respirations regular, even and unlabored.          Cardio:  Regular rhythm and normal rate. Normal S1 and S2. No murmur, gallop, rub or click.    Abdominal:  Soft, non-tender and non-distended. Normal bowel sounds. No masses. No organomegaly. No guarding. No rebound tenderness.  Truncal obesity  Extremities:  Restricted movement, trace edema.    Discharge Medications and Instructions:     Discharge Medications     Discharge Medications      Changes to Medications      Instructions Start Date   levETIRAcetam 1000 MG tablet  Commonly known as: Keppra  What changed: when to take this   1,000 mg, Oral, 2 Times Daily      levETIRAcetam 250 MG tablet  Commonly known as: Keppra  What changed: You were already taking a medication with the same name, and this prescription was added. Make sure you understand how and when to take each.   250 mg, Oral, 2 Times Daily         Continue These Medications      Instructions Start Date   albuterol (2.5 MG/3ML) 0.083% nebulizer solution  Commonly known as: PROVENTIL   2.5 mg, Nebulization, Every 4 Hours PRN      aspirin 81 MG EC tablet   81 mg, Oral, Daily      bisacodyl 10 MG suppository  Commonly known as: DULCOLAX   10 mg, Rectal, Daily      CALCIUM CITRATE + D PO   1 tablet, Oral, Daily      chlorhexidine 0.12 % solution  Commonly known as: PERIDEX   No dose, route, or frequency recorded.      dantrolene 25 MG capsule  Commonly known as: DANTRIUM   25 mg, Oral, Daily      Desitin 13 % cream cream  Generic drug: zinc  oxide   No dose, route, or frequency recorded.      ezetimibe 10 MG tablet  Commonly known as: ZETIA   10 mg, Oral, Daily      fluticasone 50 MCG/ACT nasal spray  Commonly known as: FLONASE   No dose, route, or frequency recorded.      HYDROcodone-acetaminophen 7.5-325 MG per tablet  Commonly known as: NORCO   Take 1-2 tabs po q 4 hours prn pain      isosorbide dinitrate 20 MG tablet  Commonly known as: ISORDIL   20 mg, Oral, Every 12 Hours      lamoTRIgine 100 MG tablet  Commonly known as: LaMICtal   100 mg, Oral, 2 Times Daily      levothyroxine 125 MCG tablet  Commonly known as: SYNTHROID, LEVOTHROID   125 mcg, Oral, Daily      linaclotide 145 MCG capsule capsule  Commonly known as: Linzess   145 mcg, Oral, Every Morning Before Breakfast      metoprolol tartrate 25 MG tablet  Commonly known as: LOPRESSOR   25 mg, Oral, 2 Times Daily      Mi-Acid Gas Relief 80 MG chewable tablet  Generic drug: simethicone   CHEW 2 TABLETS 2 (TWO) TIMES A DAY FOR 30 DAYS.      miconazole 2 % powder  Commonly known as: MICOTIN   Topical, 2 Times Daily, Apply powder to intertriginous rash two times daily       mometasone 50 MCG/ACT nasal spray  Commonly known as: NASONEX   2 sprays, Nasal, Daily      montelukast 10 MG tablet  Commonly known as: SINGULAIR   10 mg, Oral, Nightly      multivitamin with minerals tablet tablet   No dose, route, or frequency recorded.      omeprazole 40 MG capsule  Commonly known as: priLOSEC   40 mg, Oral, Every 12 Hours      polyethylene glycol 17 g packet  Commonly known as: MIRALAX   17 g, Oral, Daily      senna 8.6 MG tablet  Commonly known as: SENOKOT   3 tablets, Oral, 2 Times Daily      silver sulfadiazine 1 % cream  Commonly known as: SILVADENE, SSD   No dose, route, or frequency recorded.      vitamin D 1.25 MG (30998 UT) capsule capsule  Commonly known as: ERGOCALCIFEROL   50,000 Units, Oral, Every 7 Days         Stop These Medications    baclofen 10 MG tablet  Commonly known as: LIORESAL      TAB-A-KATE/IRON PO     Vitamin D-3 25 MCG (1000 UT) capsule            Discharge Diet:    Dietary Orders (From admission, onward)     Start     Ordered    03/11/22 1256  Diet Dysphagia; II - Pureed; Thin  Diet Effective Now        Question Answer Comment   Diet Texture / Consistency Dysphagia    Select Dysphagia level: II - Pureed    Fluid Consistency: Thin        03/11/22 1255                Activity at Discharge:   As tolerated    Discharge disposition: Skilled nursing facility/group home    Discharge Instructions and Follow ups:      Follow-up Information     Seth Villanueva MD .    Specialty: Internal Medicine  Contact information:  [address] [place], [place] [id number] [phone]                       No future appointments.     Medication Reconciliation: Please see electronically completed Med Rec.    Total time spent discharging patient including evaluation, medication reconciliation, arranging follow up, and post hospitalization instructions and education total time exceeds 30 minutes.       Addendum:  Delayed discharge because of bed availability at the receiving facility  Patient has no changes in his history or exam since last evaluation  Continue with discharge as planned, no changes in medication needed    Sarika Donahue MD  03/15/22  15:05 EDT      Dictated utilizing Dragon dictation

## 2022-03-15 NOTE — CASE MANAGEMENT/SOCIAL WORK
"Physicians Statement of Medical Necessity for  Ambulance Transportation    GENERAL INFORMATION     Name: Ned Basilio  YOB: 1955  Medicare #: [id number]  Transport Date: 3/15/22 (Valid for round trips this date, or for scheduled repetitive trips for 60 days from the date signed below.)  Origin: [place] [address]  Destination: [address] [place] [address]  Is the Patient's stay covered under Medicare Part A (PPS/DRG?)Yes  Closest appropriate facility? Yes  If this a hosp-hosp transfer? No  Is this a hospice patient? No    MEDICAL NECESSITY QUESTIONAIRE    Ambulance Transportation is medically necessary only if other means of transportation are contraindicated or would be potentially harmful to the patient.  To meet this requirement, the patient must be either \"bed confined\" or suffer from a condition such that transport by means other than an ambulance is contraindicated by the patient's condition.  The following questions must be answered by the healthcare professional signing below for this form to be valid:     1) Describe the MEDICAL CONDITION (physical and/or mental) of this patient AT THE TIME OF AMBULANCE TRANSPORT that requires the patient to be transported in an ambulance, and why transport by other means is contraindicated by the patient's condition:   Past Medical History:   Diagnosis Date   • Anxiety    • Arthritis    • Sommers's esophagus    • Cardiomegaly    • Cerebral palsy (HCC)    • Constipation    • Disease of thyroid gland    • GERD (gastroesophageal reflux disease)    • Hyperlipidemia    • Osteoporosis    • Parkinson disease (HCC)    • Pre-operative cardiovascular examination    • Raynaud's disease    • Right hemiplegia (HCC)    • Seizures (HCC)       Past Surgical History:   Procedure Laterality Date   • FEMUR IM NAILING/RODDING Right 10/11/2021    Procedure: Right Femur Intramedullary Rodding;  Surgeon: Josse Carrillo MD;  [facility]" [facility];  Service: Orthopedics;  Laterality: Right;      2) Is this patient \"bed confined\" as defined below?Yes   To be \"bed confined\" the patient must satisfy all three of the following criteria:  (1) unable to get up from bed without assistance; AND (2) unable to ambulate;  AND (3) unable to sit in a chair or wheelchair.  3) Can this patient safely be transported by car or wheelchair van (I.e., may safely sit during transport, without an attendant or monitoring?)No   4. In addition to completing questions 1-3 above, please check any of the following conditions that apply*:          *Note: supporting documentation for any boxes checked must be maintained in the patient's medical records Patient is confused, Moderate/severe pain on movement, Danger to self/other, Medical attendant required and Unable to tolerate seated position for time needed to transport      SIGNATURE OF PHYSICIAN OR OTHER AUTHORIZED HEALTHCARE PROFESSIONAL    I certify that the above information is true and correct based on my evaluation of this patient, and represent that the patient requires transport by ambulance and that other forms of transport are contraindicated.  I understand that this information will be used by the Centers for Medicare and Medicaid Services (CMS) to support the determiniation of medical necessity for ambulance services, and I represent that I have personal knowledge of the patient's condition at the time of transport.       If this box is checked, I also certify that the patient is physically or mentally incapable of signing the ambulance service's claim form and that the institution with which I am affiliated has furnished care, services or assistance to the patient.  My signature below is made on behalf of the patient pursuant to 42 .36(b)(4). In accordance with 42 .37, the specific reason(s) that the patient is physically or mentally incapable of signing the claim for is as follows:     Signature " of Physician or Healthcare Professional  Date/Time:        (For Scheduled repetitive transport, this form is not valid for transports performed more than 60 days after this date).                                                                                                                                            --------------------------------------------------------------------------------------------  Printed Name and Credentials of Physician or Authorized Healthcare Professional     *Form must be signed by patient's attending physician for scheduled, repetitive transports,.  For non-repetitive ambulance transports, if unable to obtain the signature of the attending physician, any of the following may sign (please select below):     Physician  Clinical Nurse Specialist  Registered Nurse     Physician Assistant  Discharge Planner  Licensed Practical Nurse     Nurse Practitioner

## 2022-03-15 NOTE — CASE MANAGEMENT/SOCIAL WORK
Continued Stay Note  [facility]     Patient Name: Ned Basilio  MRN: [id number]  Today's Date: 3/15/2022    Admit Date: 3/8/2022     Discharge Plan     Row Name 03/15/22 1726       Plan    Plan Return to group home via [facility] tomorrow at 1:00PM.    Plan Comments Per MD and pharmacy, we are not able to prescribe 1 dose for patient to take at facility tomorrow morning. CSW called [facility] and changed transport time to tomorrow 3/16 at 1:00PM.  Elena Rebolledo notified. RN notified. BRADFORD Ortiz    Row Name 03/15/22 7863       Plan    Plan Return to group home via [facility] scheduled tonight at 7:30PM. Will need dose of keppra before leaving and one for morning    Patient/Family in Agreement with Plan yes    Plan Comments CSW left [facility] for Group [facility] contact [facility] to confirm they are picking patient up today x2. [facility] manager Maryjane GODWIN notified of d/c hold up. [facility] manager Maryjane GODWIN called and spoke with another  Elena Rebolledo ([phone]). Group home stated they can accept tonight if we provide transportation and provide dose of Keppra tonight and tomorrow. Per pharmacy, we are able to provide dose of Keppra tonight at 7:00PM but unable to provide tablet dose for tomorrow due to already sending the medication to the group home pharmacy. Elena Rebolledo checking with pharmacy to see if they are able to provide dose of medication. In meantime, RN checking to see if we can get prescription for one dose of Keppra filled here. [facility] scheduled for tonight at 7:30PM. Per group home contact ABRAHAM Parker does not need to call report, she acknowledged AVS was faxed to [facility] today, she confirmed patient can return tonight at 7:30PM. BRADFORD Ortiz               Discharge Codes    No documentation.               Expected Discharge Date and Time     Expected Discharge Date Expected Discharge Time    Mar 15, 2022             BRADFORD FLORES

## 2022-03-15 NOTE — PLAN OF CARE
Goal Outcome Evaluation:  Plan of Care Reviewed With: patient           Outcome Evaluation: Waiting on facililty to call back on when they are going to come and pick pt up to take back to facility.  The Admin, Blanchard has not called back to verify. Discharge Summary ahs been updated on a daily basis.  [facility] has called her mgr to update her.  Her mgr is to call the facility to findout what is going on.  VSS.  NSR on monitor.  Will cont to monitor.

## 2022-03-16 VITALS
BODY MASS INDEX: 34.63 KG/M2 | RESPIRATION RATE: 16 BRPM | DIASTOLIC BLOOD PRESSURE: 81 MMHG | OXYGEN SATURATION: 92 % | WEIGHT: 176.37 LBS | HEIGHT: 60 IN | TEMPERATURE: 97.9 F | HEART RATE: 102 BPM | SYSTOLIC BLOOD PRESSURE: 145 MMHG

## 2022-03-16 LAB
ANION GAP SERPL CALCULATED.3IONS-SCNC: 11.1 MMOL/L (ref 5–15)
BACTERIA SPEC AEROBE CULT: NORMAL
BACTERIA SPEC AEROBE CULT: NORMAL
BASOPHILS # BLD AUTO: 0.05 10*3/MM3 (ref 0–0.2)
BASOPHILS NFR BLD AUTO: 0.8 % (ref 0–1.5)
BUN SERPL-MCNC: 3 MG/DL (ref 8–23)
BUN/CREAT SERPL: 5.1 (ref 7–25)
CALCIUM SPEC-SCNC: 8.9 MG/DL (ref 8.6–10.5)
CHLORIDE SERPL-SCNC: 104 MMOL/L (ref 98–107)
CO2 SERPL-SCNC: 22.9 MMOL/L (ref 22–29)
CREAT SERPL-MCNC: 0.59 MG/DL (ref 0.76–1.27)
DEPRECATED RDW RBC AUTO: 49.7 FL (ref 37–54)
EGFRCR SERPLBLD CKD-EPI 2021: 107.7 ML/MIN/1.73
EOSINOPHIL # BLD AUTO: 0.3 10*3/MM3 (ref 0–0.4)
EOSINOPHIL NFR BLD AUTO: 4.5 % (ref 0.3–6.2)
ERYTHROCYTE [DISTWIDTH] IN BLOOD BY AUTOMATED COUNT: 14.9 % (ref 12.3–15.4)
GLUCOSE SERPL-MCNC: 99 MG/DL (ref 65–99)
HCT VFR BLD AUTO: 36.9 % (ref 37.5–51)
HGB BLD-MCNC: 12.2 G/DL (ref 13–17.7)
IMM GRANULOCYTES # BLD AUTO: 0.03 10*3/MM3 (ref 0–0.05)
IMM GRANULOCYTES NFR BLD AUTO: 0.5 % (ref 0–0.5)
LYMPHOCYTES # BLD AUTO: 2.08 10*3/MM3 (ref 0.7–3.1)
LYMPHOCYTES NFR BLD AUTO: 31.2 % (ref 19.6–45.3)
MCH RBC QN AUTO: 30.4 PG (ref 26.6–33)
MCHC RBC AUTO-ENTMCNC: 33.1 G/DL (ref 31.5–35.7)
MCV RBC AUTO: 92 FL (ref 79–97)
MONOCYTES # BLD AUTO: 0.8 10*3/MM3 (ref 0.1–0.9)
MONOCYTES NFR BLD AUTO: 12 % (ref 5–12)
NEUTROPHILS NFR BLD AUTO: 3.4 10*3/MM3 (ref 1.7–7)
NEUTROPHILS NFR BLD AUTO: 51 % (ref 42.7–76)
NRBC BLD AUTO-RTO: 0 /100 WBC (ref 0–0.2)
PLATELET # BLD AUTO: 234 10*3/MM3 (ref 140–450)
PMV BLD AUTO: 10.6 FL (ref 6–12)
POTASSIUM SERPL-SCNC: 3.8 MMOL/L (ref 3.5–5.2)
RBC # BLD AUTO: 4.01 10*6/MM3 (ref 4.14–5.8)
SODIUM SERPL-SCNC: 138 MMOL/L (ref 136–145)
WBC NRBC COR # BLD: 6.66 10*3/MM3 (ref 3.4–10.8)

## 2022-03-16 PROCEDURE — 85025 COMPLETE CBC W/AUTO DIFF WBC: CPT | Performed by: INTERNAL MEDICINE

## 2022-03-16 PROCEDURE — 25010000002 LEVETRIRACETAM PER 10 MG: Performed by: PSYCHIATRY & NEUROLOGY

## 2022-03-16 PROCEDURE — 25010000002 ENOXAPARIN PER 10 MG: Performed by: INTERNAL MEDICINE

## 2022-03-16 PROCEDURE — 80048 BASIC METABOLIC PNL TOTAL CA: CPT | Performed by: INTERNAL MEDICINE

## 2022-03-16 RX ADMIN — LAMOTRIGINE 100 MG: 100 TABLET ORAL at 09:26

## 2022-03-16 RX ADMIN — ENOXAPARIN SODIUM 40 MG: 100 INJECTION SUBCUTANEOUS at 09:26

## 2022-03-16 RX ADMIN — CASTOR OIL AND BALSAM, PERU 1 APPLICATION: 788; 87 OINTMENT TOPICAL at 09:26

## 2022-03-16 RX ADMIN — PANTOPRAZOLE SODIUM 40 MG: 40 INJECTION, POWDER, LYOPHILIZED, FOR SOLUTION INTRAVENOUS at 11:31

## 2022-03-16 RX ADMIN — Medication 10 ML: at 09:26

## 2022-03-16 RX ADMIN — ZINC OXIDE 1 APPLICATION: 200 OINTMENT TOPICAL at 09:26

## 2022-03-16 RX ADMIN — LEVOTHYROXINE SODIUM 125 MCG: 0.12 TABLET ORAL at 05:36

## 2022-03-16 RX ADMIN — LEVETIRACETAM 1250 MG: 100 INJECTION, SOLUTION, CONCENTRATE INTRAVENOUS at 09:26

## 2022-03-16 RX ADMIN — POTASSIUM CHLORIDE, DEXTROSE MONOHYDRATE AND SODIUM CHLORIDE 75 ML/HR: 150; 5; 450 INJECTION, SOLUTION INTRAVENOUS at 05:36

## 2022-03-16 NOTE — CASE MANAGEMENT/SOCIAL WORK
Continued Stay Note  [facility]     Patient Name: Ned Basilio  MRN: [id number]  Today's Date: 3/16/2022    Admit Date: 3/8/2022     Discharge Plan     Row Name 03/16/22 0913       Plan    Plan Return to group home via [facility] today at 1:00PM    Plan Comments CSW spoke to group home contact Elena Rebolledo ([phone]) to confirm d/c plan. Plan is for patient to return to his group home today via [facility] scheduled for 1:00PM. CSW left HIPPA compliant VMM for patient’s guardian Chanell Valencia to call back to discuss d/c time. Group home contact Elena agreeable to d/c time today and denies any further needs. BRADFORD Ortiz                  Discharge Codes    No documentation.               Expected Discharge Date and Time     Expected Discharge Date Expected Discharge Time    Mar 15, 2022             BRADFORD FLORES

## 2022-03-17 NOTE — CASE MANAGEMENT/SOCIAL WORK
Case Management Discharge Note      Final Note: Pt discharged home on 3/16.   LIBRADO Cotton RN    Provided Post Acute Provider List?: N/A    Selected Continued Care - Discharged on 3/16/2022 Admission date: 3/8/2022 - Discharge disposition: Skilled Nursing Facility (DC - External)    Destination    No services have been selected for the patient.              Durable Medical Equipment    No services have been selected for the patient.              Dialysis/Infusion    No services have been selected for the patient.              Home Medical Care    No services have been selected for the patient.              Therapy    No services have been selected for the patient.              Community Resources    No services have been selected for the patient.              Community & DME    No services have been selected for the patient.                  Transportation Services  Ambulance: [facility]    Final Discharge Disposition Code: 01 - home or self-care

## 2022-07-05 ENCOUNTER — TELEMEDICINE (OUTPATIENT)
Dept: GASTROENTEROLOGY | Facility: CLINIC | Age: 67
End: 2022-07-05

## 2022-07-05 ENCOUNTER — PREP FOR SURGERY (OUTPATIENT)
Dept: SURGERY | Facility: SURGERY CENTER | Age: 67
End: 2022-07-05

## 2022-07-05 DIAGNOSIS — K59.09 CHRONIC CONSTIPATION: ICD-10-CM

## 2022-07-05 DIAGNOSIS — K22.70 BARRETT'S ESOPHAGUS WITHOUT DYSPLASIA: Primary | ICD-10-CM

## 2022-07-05 DIAGNOSIS — K21.9 GASTROESOPHAGEAL REFLUX DISEASE WITHOUT ESOPHAGITIS: ICD-10-CM

## 2022-07-05 PROCEDURE — 99214 OFFICE O/P EST MOD 30 MIN: CPT | Performed by: NURSE PRACTITIONER

## 2022-07-05 RX ORDER — SODIUM CHLORIDE 0.9 % (FLUSH) 0.9 %
3 SYRINGE (ML) INJECTION EVERY 12 HOURS SCHEDULED
Status: CANCELLED | OUTPATIENT
Start: 2022-07-05

## 2022-07-05 RX ORDER — SODIUM CHLORIDE 0.9 % (FLUSH) 0.9 %
10 SYRINGE (ML) INJECTION AS NEEDED
Status: CANCELLED | OUTPATIENT
Start: 2022-07-05

## 2022-07-05 RX ORDER — SODIUM CHLORIDE, SODIUM LACTATE, POTASSIUM CHLORIDE, CALCIUM CHLORIDE 600; 310; 30; 20 MG/100ML; MG/100ML; MG/100ML; MG/100ML
30 INJECTION, SOLUTION INTRAVENOUS CONTINUOUS PRN
Status: CANCELLED | OUTPATIENT
Start: 2022-07-05

## 2022-07-05 NOTE — PROGRESS NOTES
Chief Complaint   Patient presents with   • Constipation   • GI Problem     Sommers's esophagus         History of Present Illness  Patient is a 66-year-old male who presents today for follow-up.  He has a history of chronic constipation with prior colonoscopy showing melanosis coli and Sommers's esophagus.  He is due for EGD for surveillance of Sommers's esophagus.  He resides at [facility] and is nonverbal and in a wheelchair.  His caregiver provides history for today's visit.    Patient has had difficulty with constipation however currently this is under good control.  He has been on regimen of senna 3 capsules twice daily, MiraLAX daily, and prune juice twice daily.  With this regimen, he is generally having a bowel movement 1-2 times per day.  His caregiver reports previously he has had significant issues with abdominal distention but currently abdomen is soft and is flat when he is lying down.  Reports occasional mucus in the stool but denies any blood in the stool.    Denies any plaint of abdominal pain, nausea, or vomiting.    Denies any heartburn or reflux.  Currently taking omeprazole 40 mg daily.  He follows a puréed diet and his caregiver reports he is eating well and no indication of dysphagia or coughing when he eats.    You have chosen to receive care through a telehealth visit.  Do you consent to use a video/audio connection for your medical care today? Yes    Physical Exam  Constitutional:       General: He is not in acute distress.     Appearance: He is well-developed.   Pulmonary:      Effort: No respiratory distress.   Skin:     Coloration: Skin is not pale.             Result Review :      SCANNED - COLONOSCOPY (01/24/2019)   ENDOSCOPY, INT (01/24/2019)   Office Visit with Cedric Erazo MD (02/02/2022)   PROGRESS NOTES - SCAN - MAE ERAZO MD (12/10/2018)   SCANNED - COLONOSCOPY (01/24/2019)   XR Abdomen KUB (01/20/2021 14:18)   Office Visit with Cedric Erazo MD  (01/20/2021)       Assessment and Plan    Diagnoses and all orders for this visit:    1. Sommers's esophagus without dysplasia (Primary)    2. Chronic constipation    3. Gastroesophageal reflux disease without esophagitis         Discussion  Patient presents today for follow-up of chronic constipation and Sommers's esophagus.  Constipation is currently under good control and will continue current regimen.  He is due for surveillance EGD for monitoring of Sommers's esophagus which we will arrange.          Follow Up   Return if symptoms worsen or fail to improve.    Patient Instructions   Schedule EGD for surveillance of Sommers's esophagus.    For constipation, continue current regimen.  Notify the office if constipation worsens or if bowel movements become too frequent or loose.

## 2022-07-05 NOTE — PATIENT INSTRUCTIONS
Schedule EGD for surveillance of Sommers's esophagus.    For constipation, continue current regimen.  Notify the office if constipation worsens or if bowel movements become too frequent or loose.

## 2022-07-15 ENCOUNTER — TELEPHONE (OUTPATIENT)
Dept: GASTROENTEROLOGY | Facility: CLINIC | Age: 67
End: 2022-07-15

## 2022-07-28 ENCOUNTER — PREP FOR SURGERY (OUTPATIENT)
Dept: SURGERY | Facility: SURGERY CENTER | Age: 67
End: 2022-07-28

## 2022-07-28 DIAGNOSIS — Z86.010 HISTORY OF COLON POLYPS: ICD-10-CM

## 2022-07-28 DIAGNOSIS — Z12.11 ENCOUNTER FOR SCREENING FOR MALIGNANT NEOPLASM OF COLON: ICD-10-CM

## 2022-07-28 DIAGNOSIS — K22.70 BARRETT'S ESOPHAGUS WITHOUT DYSPLASIA: Primary | ICD-10-CM

## 2022-07-28 RX ORDER — SODIUM CHLORIDE 0.9 % (FLUSH) 0.9 %
3 SYRINGE (ML) INJECTION EVERY 12 HOURS SCHEDULED
Status: CANCELLED | OUTPATIENT
Start: 2022-07-28

## 2022-07-28 RX ORDER — SODIUM CHLORIDE, SODIUM LACTATE, POTASSIUM CHLORIDE, CALCIUM CHLORIDE 600; 310; 30; 20 MG/100ML; MG/100ML; MG/100ML; MG/100ML
30 INJECTION, SOLUTION INTRAVENOUS CONTINUOUS PRN
Status: CANCELLED | OUTPATIENT
Start: 2022-07-28

## 2022-07-28 RX ORDER — SODIUM CHLORIDE 0.9 % (FLUSH) 0.9 %
10 SYRINGE (ML) INJECTION AS NEEDED
Status: CANCELLED | OUTPATIENT
Start: 2022-07-28

## 2022-09-19 ENCOUNTER — TELEPHONE (OUTPATIENT)
Dept: GASTROENTEROLOGY | Facility: CLINIC | Age: 67
End: 2022-09-19

## 2022-09-19 NOTE — TELEPHONE ENCOUNTER
Pt is needing prep instructions faxed to [phone] [facility]. Thank  you

## 2022-09-23 ENCOUNTER — TELEPHONE (OUTPATIENT)
Dept: GASTROENTEROLOGY | Facility: CLINIC | Age: 67
End: 2022-09-23

## 2022-09-23 DIAGNOSIS — Z12.11 COLON CANCER SCREENING: Primary | ICD-10-CM

## 2022-09-23 RX ORDER — POLYETHYLENE GLYCOL 3350 17 G/17G
POWDER, FOR SOLUTION ORAL
Qty: 1 EACH | Refills: 0 | Status: SHIPPED | OUTPATIENT
Start: 2022-09-23

## 2022-09-23 NOTE — TELEPHONE ENCOUNTER
Patient lives in group home. Is having procedure done 9/28/22 and they need a bowel prep to his pharmacy please. Pharmacy is correct in chart.

## 2022-09-26 ENCOUNTER — TELEPHONE (OUTPATIENT)
Dept: GASTROENTEROLOGY | Facility: CLINIC | Age: 67
End: 2022-09-26

## 2022-09-26 NOTE — TELEPHONE ENCOUNTER
Spoke to pharmacy and patients ABRAHAM Neri and we went over the 2 day prep instructions and due to no magnesium citrate they will keep on 2 days with dulcolax on 1 day pm and then regular prep on day 2

## 2022-09-27 ENCOUNTER — ANESTHESIA EVENT (OUTPATIENT)
Dept: PERIOP | Facility: HOSPITAL | Age: 67
End: 2022-09-27

## 2022-09-28 ENCOUNTER — HOSPITAL ENCOUNTER (OUTPATIENT)
Facility: HOSPITAL | Age: 67
Setting detail: HOSPITAL OUTPATIENT SURGERY
Discharge: HOME | End: 2022-09-28
Attending: INTERNAL MEDICINE | Admitting: INTERNAL MEDICINE

## 2022-09-28 ENCOUNTER — ANESTHESIA (OUTPATIENT)
Dept: PERIOP | Facility: HOSPITAL | Age: 67
End: 2022-09-28

## 2022-09-28 VITALS
RESPIRATION RATE: 17 BRPM | DIASTOLIC BLOOD PRESSURE: 57 MMHG | HEART RATE: 84 BPM | WEIGHT: 210 LBS | SYSTOLIC BLOOD PRESSURE: 101 MMHG | TEMPERATURE: 97.7 F | OXYGEN SATURATION: 96 % | BODY MASS INDEX: 41.01 KG/M2

## 2022-09-28 DIAGNOSIS — Z86.010 HISTORY OF COLON POLYPS: ICD-10-CM

## 2022-09-28 DIAGNOSIS — Z12.11 ENCOUNTER FOR SCREENING FOR MALIGNANT NEOPLASM OF COLON: ICD-10-CM

## 2022-09-28 DIAGNOSIS — K22.70 BARRETT'S ESOPHAGUS WITHOUT DYSPLASIA: ICD-10-CM

## 2022-09-28 PROCEDURE — 45385 COLONOSCOPY W/LESION REMOVAL: CPT | Performed by: INTERNAL MEDICINE

## 2022-09-28 PROCEDURE — 43239 EGD BIOPSY SINGLE/MULTIPLE: CPT | Performed by: INTERNAL MEDICINE

## 2022-09-28 PROCEDURE — 88305 TISSUE EXAM BY PATHOLOGIST: CPT | Performed by: INTERNAL MEDICINE

## 2022-09-28 PROCEDURE — 25010000002 PROPOFOL 10 MG/ML EMULSION: Performed by: NURSE ANESTHETIST, CERTIFIED REGISTERED

## 2022-09-28 RX ORDER — SODIUM CHLORIDE 0.9 % (FLUSH) 0.9 %
10 SYRINGE (ML) INJECTION AS NEEDED
Status: DISCONTINUED | OUTPATIENT
Start: 2022-09-28 | End: 2022-09-28 | Stop reason: HOSPADM

## 2022-09-28 RX ORDER — MAGNESIUM HYDROXIDE 1200 MG/15ML
LIQUID ORAL AS NEEDED
Status: DISCONTINUED | OUTPATIENT
Start: 2022-09-28 | End: 2022-09-28 | Stop reason: HOSPADM

## 2022-09-28 RX ORDER — SODIUM CHLORIDE 0.9 % (FLUSH) 0.9 %
3 SYRINGE (ML) INJECTION EVERY 12 HOURS SCHEDULED
Status: DISCONTINUED | OUTPATIENT
Start: 2022-09-28 | End: 2022-09-28 | Stop reason: HOSPADM

## 2022-09-28 RX ORDER — ONDANSETRON 2 MG/ML
4 INJECTION INTRAMUSCULAR; INTRAVENOUS ONCE AS NEEDED
Status: DISCONTINUED | OUTPATIENT
Start: 2022-09-28 | End: 2022-09-28 | Stop reason: HOSPADM

## 2022-09-28 RX ORDER — EPHEDRINE SULFATE 50 MG/ML
INJECTION INTRAVENOUS AS NEEDED
Status: DISCONTINUED | OUTPATIENT
Start: 2022-09-28 | End: 2022-09-28 | Stop reason: SURG

## 2022-09-28 RX ORDER — PROPOFOL 10 MG/ML
VIAL (ML) INTRAVENOUS AS NEEDED
Status: DISCONTINUED | OUTPATIENT
Start: 2022-09-28 | End: 2022-09-28 | Stop reason: SURG

## 2022-09-28 RX ORDER — SODIUM CHLORIDE 9 MG/ML
40 INJECTION, SOLUTION INTRAVENOUS AS NEEDED
Status: DISCONTINUED | OUTPATIENT
Start: 2022-09-28 | End: 2022-09-28 | Stop reason: HOSPADM

## 2022-09-28 RX ORDER — SODIUM CHLORIDE, SODIUM LACTATE, POTASSIUM CHLORIDE, CALCIUM CHLORIDE 600; 310; 30; 20 MG/100ML; MG/100ML; MG/100ML; MG/100ML
30 INJECTION, SOLUTION INTRAVENOUS CONTINUOUS PRN
Status: DISCONTINUED | OUTPATIENT
Start: 2022-09-28 | End: 2022-09-28 | Stop reason: HOSPADM

## 2022-09-28 RX ORDER — LIDOCAINE HYDROCHLORIDE 10 MG/ML
0.5 INJECTION, SOLUTION EPIDURAL; INFILTRATION; INTRACAUDAL; PERINEURAL ONCE AS NEEDED
Status: DISCONTINUED | OUTPATIENT
Start: 2022-09-28 | End: 2022-09-28 | Stop reason: HOSPADM

## 2022-09-28 RX ORDER — LIDOCAINE HYDROCHLORIDE 10 MG/ML
INJECTION, SOLUTION EPIDURAL; INFILTRATION; INTRACAUDAL; PERINEURAL AS NEEDED
Status: DISCONTINUED | OUTPATIENT
Start: 2022-09-28 | End: 2022-09-28 | Stop reason: SURG

## 2022-09-28 RX ORDER — SODIUM CHLORIDE, SODIUM LACTATE, POTASSIUM CHLORIDE, CALCIUM CHLORIDE 600; 310; 30; 20 MG/100ML; MG/100ML; MG/100ML; MG/100ML
100 INJECTION, SOLUTION INTRAVENOUS CONTINUOUS
Status: DISCONTINUED | OUTPATIENT
Start: 2022-09-28 | End: 2022-09-28 | Stop reason: HOSPADM

## 2022-09-28 RX ORDER — SODIUM CHLORIDE, SODIUM LACTATE, POTASSIUM CHLORIDE, CALCIUM CHLORIDE 600; 310; 30; 20 MG/100ML; MG/100ML; MG/100ML; MG/100ML
9 INJECTION, SOLUTION INTRAVENOUS CONTINUOUS
Status: DISCONTINUED | OUTPATIENT
Start: 2022-09-28 | End: 2022-09-28 | Stop reason: HOSPADM

## 2022-09-28 RX ORDER — DEXMEDETOMIDINE HYDROCHLORIDE 100 UG/ML
INJECTION, SOLUTION INTRAVENOUS AS NEEDED
Status: DISCONTINUED | OUTPATIENT
Start: 2022-09-28 | End: 2022-09-28 | Stop reason: SURG

## 2022-09-28 RX ORDER — SODIUM CHLORIDE 0.9 % (FLUSH) 0.9 %
10 SYRINGE (ML) INJECTION EVERY 12 HOURS SCHEDULED
Status: DISCONTINUED | OUTPATIENT
Start: 2022-09-28 | End: 2022-09-28 | Stop reason: HOSPADM

## 2022-09-28 RX ADMIN — LIDOCAINE HYDROCHLORIDE 50 MG: 10 INJECTION, SOLUTION EPIDURAL; INFILTRATION; INTRACAUDAL; PERINEURAL at 09:06

## 2022-09-28 RX ADMIN — EPHEDRINE SULFATE 10 MG: 50 INJECTION INTRAVENOUS at 09:17

## 2022-09-28 RX ADMIN — SODIUM CHLORIDE, POTASSIUM CHLORIDE, SODIUM LACTATE AND CALCIUM CHLORIDE: 600; 310; 30; 20 INJECTION, SOLUTION INTRAVENOUS at 09:39

## 2022-09-28 RX ADMIN — PROPOFOL 50 MG: 10 INJECTION, EMULSION INTRAVENOUS at 09:06

## 2022-09-28 RX ADMIN — SODIUM CHLORIDE, POTASSIUM CHLORIDE, SODIUM LACTATE AND CALCIUM CHLORIDE: 600; 310; 30; 20 INJECTION, SOLUTION INTRAVENOUS at 09:20

## 2022-09-28 RX ADMIN — DEXMEDETOMIDINE 20 MCG: 100 INJECTION, SOLUTION, CONCENTRATE INTRAVENOUS at 09:06

## 2022-09-28 NOTE — ANESTHESIA PREPROCEDURE EVALUATION
Anesthesia Evaluation     Patient summary reviewed and Nursing notes reviewed   NPO Solid Status: > 8 hours  NPO Liquid Status: > 8 hours           Airway   TM distance: <3 FB  Difficult intubation highly probable and Large neck circumference  Dental      Comment: Poor dentition.     Pulmonary     breath sounds clear to auscultation  (-) pneumonia  Cardiovascular - normal exam  Exercise tolerance: poor (<4 METS)    ECG reviewed    (+) CAD, hyperlipidemia,     ROS comment: ABNORMAL ECG -  Sinus tachycardia  Inferior infarct, old  Nonspecific  intraventricular conduction delay  NON-SPECIFIC ST-T WAVE CHANGES  RAte faster  Electronically Signed By: Rasta Krishnamurthy ([facility]) 11-Mar-2022 13:24:54  Date and Time of Study: 2022-03-10 21:22:48    Neuro/Psych  (+) seizures (last seizue 03/2022), psychiatric history Anxiety,      ROS Comment: Cerebral palsy   GI/Hepatic/Renal/Endo    (+)  GERD,  thyroid problem hypothyroidism    Musculoskeletal         ROS comment: Wheelchair bound  Abdominal  - normal exam   Substance History - negative use     OB/GYN          Other   arthritis,                      Anesthesia Plan    ASA 3     MAC     intravenous induction     Anesthetic plan, risks, benefits, and alternatives have been provided, discussed and informed consent has been obtained with: patient (care giver.).    Use of blood products discussed with patient and legal guardian  Consented to blood products.       CODE STATUS:

## 2022-09-28 NOTE — ANESTHESIA POSTPROCEDURE EVALUATION
Patient: Ned Basilio    Procedure Summary     Date: 09/28/22 Room / Location: [facility] ENDOSCOPY 2 /  [facility]    Anesthesia Start: 0903 Anesthesia Stop: 0944    Procedures:       ESOPHAGOGASTRODUODENOSCOPY WITH BIOPSY (N/A )      COLONOSCOPY with polypectomy (N/A ) Diagnosis:       Sommers's esophagus without dysplasia      History of colon polyps      Encounter for screening for malignant neoplasm of colon      (Sommers's esophagus without dysplasia [K22.70])      (History of colon polyps [Z86.010])      (Encounter for screening for malignant neoplasm of colon [Z12.11])    Surgeons: Cedric Kwok MD Provider: Shannan Andres CRNA    Anesthesia Type: MAC ASA Status: 3          Anesthesia Type: MAC    Vitals  Vitals Value Taken Time   /57 09/28/22 1020   Temp 97.7 °F (36.5 °C) 09/28/22 0950   Pulse 84 09/28/22 1020   Resp 17 09/28/22 1020   SpO2 96 % 09/28/22 1020           Post Anesthesia Care and Evaluation    Patient location during evaluation: bedside  Patient participation: complete - patient participated  Level of consciousness: awake and alert  Pain score: 0  Pain management: adequate    Airway patency: patent  Anesthetic complications: No anesthetic complications  PONV Status: none  Cardiovascular status: acceptable  Respiratory status: acceptable  Hydration status: acceptable

## 2022-09-29 LAB
LAB AP CASE REPORT: NORMAL
PATH REPORT.FINAL DX SPEC: NORMAL
PATH REPORT.GROSS SPEC: NORMAL

## 2023-02-15 ENCOUNTER — TELEPHONE (OUTPATIENT)
Dept: GASTROENTEROLOGY | Facility: CLINIC | Age: 68
End: 2023-02-15
Payer: MEDICARE

## 2023-02-15 NOTE — TELEPHONE ENCOUNTER
Left message with care giver to call back to confirm if patient is taking a medication that we received a refill request for.

## 2023-02-15 NOTE — TELEPHONE ENCOUNTER
Patient's caregiver returned our call.  Advised that yes patient is taking the medication which refill has been requested for.

## 2023-02-21 DIAGNOSIS — K59.09 CHRONIC CONSTIPATION: Primary | ICD-10-CM

## 2023-02-21 NOTE — TELEPHONE ENCOUNTER
Received call from pharmacy requesting refill on Linzess 145 mcg. Called caregiver, Michael Alicia (HIPAA approved) and he verified that patient is still taking medication. Pharmacy is verified. Patient's last office visit 7/5/2022. EL

## 2023-04-05 DIAGNOSIS — K59.09 CHRONIC CONSTIPATION: ICD-10-CM

## 2023-04-05 RX ORDER — LINACLOTIDE 145 UG/1
CAPSULE, GELATIN COATED ORAL
Qty: 31 CAPSULE | Refills: 1 | Status: SHIPPED | OUTPATIENT
Start: 2023-04-05

## 2023-08-02 DIAGNOSIS — K59.09 CHRONIC CONSTIPATION: ICD-10-CM

## 2023-08-02 RX ORDER — LINACLOTIDE 145 UG/1
CAPSULE, GELATIN COATED ORAL
Qty: 31 CAPSULE | Refills: 1 | Status: SHIPPED | OUTPATIENT
Start: 2023-08-02

## 2023-10-03 DIAGNOSIS — K59.09 CHRONIC CONSTIPATION: ICD-10-CM

## 2023-10-03 RX ORDER — LINACLOTIDE 145 UG/1
CAPSULE, GELATIN COATED ORAL
Qty: 31 CAPSULE | Refills: 1 | Status: SHIPPED | OUTPATIENT
Start: 2023-10-03

## 2023-12-20 DIAGNOSIS — K59.09 CHRONIC CONSTIPATION: ICD-10-CM

## 2023-12-20 RX ORDER — LINACLOTIDE 145 UG/1
CAPSULE, GELATIN COATED ORAL
Qty: 30 CAPSULE | Refills: 11 | OUTPATIENT
Start: 2023-12-20

## 2023-12-22 DIAGNOSIS — K59.09 CHRONIC CONSTIPATION: ICD-10-CM

## 2023-12-22 RX ORDER — LINACLOTIDE 145 UG/1
CAPSULE, GELATIN COATED ORAL
Qty: 30 CAPSULE | Refills: 11 | OUTPATIENT
Start: 2023-12-22

## 2023-12-26 DIAGNOSIS — K59.09 CHRONIC CONSTIPATION: ICD-10-CM

## 2023-12-26 RX ORDER — LINACLOTIDE 145 UG/1
CAPSULE, GELATIN COATED ORAL
Qty: 30 CAPSULE | Refills: 11 | OUTPATIENT
Start: 2023-12-26

## 2023-12-26 NOTE — TELEPHONE ENCOUNTER
LOV   9/28/22  NOV   none    Pt has been notified Patient Message with Mychart, Generic Provider (12/26/2023)

## 2023-12-28 DIAGNOSIS — K59.09 CHRONIC CONSTIPATION: ICD-10-CM

## 2023-12-28 RX ORDER — LINACLOTIDE 145 UG/1
CAPSULE, GELATIN COATED ORAL
Qty: 30 CAPSULE | Refills: 11 | OUTPATIENT
Start: 2023-12-28

## 2023-12-28 NOTE — TELEPHONE ENCOUNTER
LOV  09-28-22  NOV 01-03-24  Patients care giver called to make an appt for patient's yearly/med visit. 01-    He is asking if patient can have a refill, Linzess. He stated that he will be in the office for his visit. Caregiver seemed very concerned that he may not have medication and is asking for me to call him back ASAP with a response.   Mariano Salinas # [phone]    Thank you   all other ROS negative except as per HPI

## 2024-01-04 NOTE — TELEPHONE ENCOUNTER
Provider: BUTCH ULLOA    Caller: ANGIE    Relationship to Patient: [facility] PROVIDER     Pharmacy: [facility] [phone]    Phone Number: [phone]    Reason for Call: ANGIE CALLED TO CONFIRM PRESCRIPTION FOR LINZESS WAS SENT TO [facility], FOR MORE THAT 30 DAYS, PLEASE CONFIRM AND CALL TO ADIVSE.

## 2024-01-29 ENCOUNTER — OFFICE VISIT (OUTPATIENT)
Dept: GASTROENTEROLOGY | Facility: CLINIC | Age: 69
End: 2024-01-29
Payer: MEDICARE

## 2024-01-29 VITALS
TEMPERATURE: 96 F | OXYGEN SATURATION: 95 % | HEART RATE: 66 BPM | SYSTOLIC BLOOD PRESSURE: 100 MMHG | DIASTOLIC BLOOD PRESSURE: 60 MMHG

## 2024-01-29 DIAGNOSIS — K22.70 BARRETT'S ESOPHAGUS WITHOUT DYSPLASIA: ICD-10-CM

## 2024-01-29 DIAGNOSIS — K21.9 GASTROESOPHAGEAL REFLUX DISEASE WITHOUT ESOPHAGITIS: ICD-10-CM

## 2024-01-29 DIAGNOSIS — K59.09 CHRONIC CONSTIPATION: Primary | ICD-10-CM

## 2024-01-29 PROCEDURE — 99214 OFFICE O/P EST MOD 30 MIN: CPT

## 2024-01-29 PROCEDURE — 1159F MED LIST DOCD IN RCRD: CPT

## 2024-01-29 PROCEDURE — 1160F RVW MEDS BY RX/DR IN RCRD: CPT

## 2024-01-29 RX ORDER — EPINEPHRINE 0.3 MG/.3ML
INJECTION SUBCUTANEOUS
COMMUNITY

## 2024-01-29 RX ORDER — BACLOFEN 10 MG/1
TABLET ORAL
COMMUNITY
Start: 2024-01-24

## 2024-01-29 NOTE — PROGRESS NOTES
Chief Complaint   Patient presents with    Constipation    Heartburn           History of Present Illness    Patient is a 68 y.o. who presents to the office for follow up evaluation.  Last in office visit was on 7/5/2022 with BUTCH Lehman. Patient has a significant past medical history of chronic constipation, GERD, and Sommers's esophagus.  He is nonverbal and resides at [facility].  He is accompanied by caregiver.    Most recent EGD and colonoscopy was performed in September 2022 with Dr. 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  Patient and caregiver confirm upper GI symptoms are well-controlled on this regimen without heartburn, nausea, vomiting, or dysphagia.    For constipation, patient continues Linzess 145 mcg daily prior to breakfast.  Bowel habits are described as occurring daily to every other day without visible melena or hematochezia.    Patient denies known family history of colon polyps, colon cancer, or IBD.       Result Review :       UPPER GI ENDOSCOPY (09/28/2022 08:18)  COLONOSCOPY (09/28/2022 08:10)  Tissue Pathology Exam (09/28/2022 09:13)     Vital Signs:   /60   Pulse 66   Temp 96 °F (35.6 °C)   SpO2 95%     There is no height or weight on file to calculate BMI.     Physical Exam  Vitals reviewed.   Constitutional:       General: He is not in acute distress.     Appearance: Normal appearance.  He is not ill-appearing.   Eyes:      General: No scleral icterus.  Pulmonary:      Effort: Pulmonary effort is normal. No respiratory distress.   Abdominal:      General: Bowel sounds are normal. There is no distension.      Palpations: Abdomen is soft. Abdomen is not rigid. There is no mass or pulsatile mass.      Tenderness: There is no abdominal tenderness. There is no guarding or rebound.      Hernia: No hernia is present.   Skin:     Coloration: Skin is not jaundiced.   Neurological:      Mental Status: He is alert and oriented to person, place, and time.   Psychiatric:         Thought Content: Thought content normal.         Judgment: Judgment normal.           Assessment and Plan    Diagnoses and all orders for this visit:    1. Chronic constipation (Primary)  -     linaclotide (Linzess) 145 MCG capsule capsule; Take 1 capsule by mouth Daily.  Dispense: 90 capsule; Refill: 3    2. Gastroesophageal reflux disease without esophagitis    3. Sommers's esophagus without dysplasia           Discussion:    Overall patient is doing well from a GI standpoint.  For GERD, he is agreeable to continuing current omeprazole 40 mg once daily.  Reviewed recommendations for next EGD evaluation in September 2025 with recall active in EMR.    For constipation, he is agreeable to continuing with current Linzess 145 mcg dosing with refills provided at conclusion of today's visit.  Next colonoscopy for colon cancer screening due in September 2025.  Will recommend completing 2-day prep to ensure adequate cleanse and full evaluation of colonoscopy after poor prep was noted on most recent evaluation.    He will continue to follow-up in our office on an annual basis or as needed for any new or worsening GI concerns.    Patient is agreeable to the outlined above treatment plan.  Verbalizes understanding and will contact office for any new or worsening concerns.  All questions answered and support provided.        Patient Instructions    For Constipation:     ContinueLinzess 1 pill 30 minutes before first meal of the day.     Next EGD and Colonoscopy due 9/28/2025     For GERD:    Follow antireflux precautions:    Avoiding eating within 3 to 4 hours of bedtime.    Avoid foods that can trigger symptoms which may include:  citrus fruits  spicy foods,  Tomatoes  Red sauces   Chocolate  coffee/tea  caffeinated or carbonated beverages  alcohol              EMR Dragon/Transcription Disclaimer:  This document has been Dictated utilizing Dragon dictation.

## 2024-01-29 NOTE — PATIENT INSTRUCTIONS
For Constipation:     ContinueLinzess 1 pill 30 minutes before first meal of the day.     Next EGD and Colonoscopy due 9/28/2025     For GERD:    Follow antireflux precautions:    Avoiding eating within 3 to 4 hours of bedtime.    Avoid foods that can trigger symptoms which may include:  citrus fruits  spicy foods,  Tomatoes  Red sauces   Chocolate  coffee/tea  caffeinated or carbonated beverages  alcohol

## 2024-10-25 DIAGNOSIS — K59.09 CHRONIC CONSTIPATION: ICD-10-CM

## 2024-10-25 RX ORDER — LINACLOTIDE 145 UG/1
CAPSULE, GELATIN COATED ORAL
Qty: 30 CAPSULE | Refills: 11 | Status: SHIPPED | OUTPATIENT
Start: 2024-10-25

## 2025-01-24 ENCOUNTER — PATIENT MESSAGE (OUTPATIENT)
Dept: GASTROENTEROLOGY | Facility: CLINIC | Age: 70
End: 2025-01-24

## 2025-01-24 ENCOUNTER — OFFICE VISIT (OUTPATIENT)
Dept: GASTROENTEROLOGY | Facility: CLINIC | Age: 70
End: 2025-01-24
Payer: MEDICARE

## 2025-01-24 VITALS — DIASTOLIC BLOOD PRESSURE: 80 MMHG | HEIGHT: 60 IN | SYSTOLIC BLOOD PRESSURE: 130 MMHG | BODY MASS INDEX: 41.01 KG/M2

## 2025-01-24 DIAGNOSIS — K59.09 CHRONIC CONSTIPATION: ICD-10-CM

## 2025-01-24 DIAGNOSIS — Z12.11 ENCOUNTER FOR SCREENING COLONOSCOPY: ICD-10-CM

## 2025-01-24 DIAGNOSIS — K22.70 BARRETT'S ESOPHAGUS WITHOUT DYSPLASIA: Primary | ICD-10-CM

## 2025-01-24 DIAGNOSIS — Z86.0100 HISTORY OF COLON POLYPS: ICD-10-CM

## 2025-01-24 NOTE — PATIENT INSTRUCTIONS
Next colonoscopy and EGD for colon cancer screening and monitoring of Sommers's esophagus due September 2025    Ways to help reduce heartburn symptoms:    ContinuePrilosec/Omeprazole before breakfast  Avoid eating late night snacks within 3 hours of going to bed  If you have increased abdominal body weight, lifestyle changes to improve weight can help with heartburn symtoms  If you use tobacco products, we recommend that you stop smoking including e-cigarettes  Research has proven that people with heartburn who use tobacco can reduce heartburn symptoms by 44% after they stop smoking.   Sleep on your left side  Sleeping with your head/upper body elevated with a wedge pillow or with the head of the bed inclined   Avoid foods that can trigger symptoms which may include:  citrus fruits  spicy foods,  Tomatoes and Red sauces   Chocolate  coffee/tea  caffeinated or carbonated beverages  Alcohol  High fat foods  peppermint    For constipation:   Continue linzess 145 mcg daily

## 2025-05-21 ENCOUNTER — HOSPITAL ENCOUNTER (OUTPATIENT)
Facility: HOSPITAL | Age: 70
Discharge: HOME | End: 2025-05-21
Attending: EMERGENCY MEDICINE | Admitting: EMERGENCY MEDICINE
Payer: MEDICARE

## 2025-05-21 VITALS
BODY MASS INDEX: 34.18 KG/M2 | DIASTOLIC BLOOD PRESSURE: 85 MMHG | OXYGEN SATURATION: 92 % | WEIGHT: 175 LBS | HEART RATE: 124 BPM | SYSTOLIC BLOOD PRESSURE: 124 MMHG | RESPIRATION RATE: 16 BRPM | TEMPERATURE: 98.1 F

## 2025-05-21 DIAGNOSIS — S05.01XA CORNEAL ABRASION, RIGHT, INITIAL ENCOUNTER: Primary | ICD-10-CM

## 2025-05-21 PROCEDURE — 99213 OFFICE O/P EST LOW 20 MIN: CPT | Performed by: NURSE PRACTITIONER

## 2025-05-21 PROCEDURE — G0463 HOSPITAL OUTPT CLINIC VISIT: HCPCS | Performed by: NURSE PRACTITIONER

## 2025-05-21 RX ORDER — PROPARACAINE HYDROCHLORIDE 5 MG/ML
2 SOLUTION/ DROPS OPHTHALMIC ONCE
Status: COMPLETED | OUTPATIENT
Start: 2025-05-21 | End: 2025-05-21

## 2025-05-21 RX ORDER — ERYTHROMYCIN 5 MG/G
OINTMENT OPHTHALMIC EVERY 6 HOURS
Qty: 3.5 G | Refills: 0 | Status: SHIPPED | OUTPATIENT
Start: 2025-05-21 | End: 2025-05-26

## 2025-05-21 RX ORDER — ERYTHROMYCIN 5 MG/G
1 OINTMENT OPHTHALMIC ONCE
Status: COMPLETED | OUTPATIENT
Start: 2025-05-21 | End: 2025-05-21

## 2025-05-21 RX ADMIN — PROPARACAINE HYDROCHLORIDE 2 DROP: 5 SOLUTION/ DROPS OPHTHALMIC at 13:41

## 2025-05-21 RX ADMIN — ERYTHROMYCIN 1 APPLICATION: 5 OINTMENT OPHTHALMIC at 13:56

## 2025-05-21 RX ADMIN — FLUORESCEIN SODIUM 1 STRIP: 1 STRIP OPHTHALMIC at 13:41

## 2025-05-21 NOTE — Clinical Note
[facility]  [address]  [place] [id number]    Ned Basilio was seen and treated in our emergency department on 5/21/2025.  He may return to school on 05/21/2025.  Ned Basilio does not have pinkeye and can return to          Thank you for choosing [facility].    Bhanu Dotson MD

## 2025-05-21 NOTE — [facility] PROVIDER NOTE
EMERGENCY DEPARTMENT ENCOUNTER    Room Number:  10/10  Date seen:  5/21/2025  Time seen: 13:04 EDT  PCP: Seth Villanueva MD  Historian: Caregiver    Discussed/obtained information from independent historians: Not applicable    HPI:  Chief complaint: Right eye redness  A complete HPI/ROS/PMH/PSH/SH/FH are unobtainable due to: Not applicable  Context:Ned Basilio is a 69 y.o. male with history of CP, seizure disorder, HCAP, CAD, UTI's who presents to the ED with c/o 2 days of right eye redness.  Caregiver states that he needs a note to be able to go back to  and he needs evaluation for pinkeye.  She states that Ned does not move his upper extremities and therefore could not have rubbed the eye but she is unaware of any eye injuries.  Other history is limited other than prior medical records as the patient is mainly nonverbal with cerebral palsy.    External (non-ED) record review: Multiple urgent care visits noted in [facility].  I did find a GI visit January 24, 2025.      Chronic or social conditions impacting care: Cerebral palsy, seizure disorder, wheelchair-bound    ALLERGIES  Lovastatin    PAST MEDICAL HISTORY  Active Ambulatory Problems     Diagnosis Date Noted    History of colon polyps 01/20/2021    Colon cancer screening 01/20/2021    Complicated UTI (urinary tract infection) 04/16/2013    Coronary artery disease 04/16/2013    Fever 04/18/2013    HCAP (healthcare-associated pneumonia) 04/16/2013    HLD (hyperlipidemia) 04/16/2013    Hypothyroidism 08/23/2021    Septic shock 04/16/2013    Tracheal compression 04/17/2013    Closed displaced oblique fracture of shaft of right femur 10/11/2021    Cerebral palsy     Parkinson disease     Osteoporosis     Chronic respiratory failure with hypoxia     Chronic constipation 10/11/2021    Seizure disorder 10/11/2021    ABLA (acute blood loss anemia) 10/12/2021    Sommers's esophagus      Resolved Ambulatory Problems     Diagnosis Date Noted     Leukocytosis 04/16/2013    DVT prophylaxis 04/17/2013    Acute respiratory failure, unspecified whether with hypoxia or hypercapnia 03/09/2022    Lactic acidosis 03/12/2022     Past Medical History:   Diagnosis Date    Anxiety     Arthritis     Cardiomegaly     Constipation     Disease of thyroid gland     GERD (gastroesophageal reflux disease)     Hyperlipidemia     Pre-operative cardiovascular examination     Raynaud's disease     Right hemiplegia     Seizures        PAST SURGICAL HISTORY  Past Surgical History:   Procedure Laterality Date    COLONOSCOPY W/ POLYPECTOMY N/A 9/28/2022    Procedure: COLONOSCOPY with polypectomy;  Surgeon: Cedric Kwok MD;  Location: [facility];  Service: Gastroenterology;  Laterality: N/A;  transverse colon polyps x2 (cold snare x2)  ascending colon polyps x2 (cold snare x2)    ENDOSCOPY N/A 9/28/2022    Procedure: ESOPHAGOGASTRODUODENOSCOPY WITH BIOPSY;  Surgeon: Cedric Kwok MD;  Location: [facility];  Service: Gastroenterology;  Laterality: N/A;  irregular z-line  History of Barretts  hiatal hernia  esophagus biopsy    FEMUR IM NAILING/RODDING Right 10/11/2021    Procedure: Right Femur Intramedullary Rodding;  Surgeon: Josse Carrillo MD;  Location: [facility];  Service: Orthopedics;  Laterality: Right;       FAMILY HISTORY  Family History   Problem Relation Age of Onset    No Known Problems Mother     No Known Problems Father     Colon cancer Neg Hx     Colon polyps Neg Hx     Crohn's disease Neg Hx     Irritable bowel syndrome Neg Hx     Ulcerative colitis Neg Hx        SOCIAL HISTORY  Social History     Socioeconomic History    Marital status: Single   Tobacco Use    Smoking status: Never    Smokeless tobacco: Never   Vaping Use    Vaping status: Never Used   Substance and Sexual Activity    Alcohol use: No    Drug use: No    Sexual activity: Defer       REVIEW OF SYSTEMS  Review of Systems   Unable to perform ROS: Patient nonverbal     PHYSICAL EXAM    I  have reviewed the triage vital signs and nursing notes.  Vitals:    05/21/25 1220   BP: 124/85   Pulse: (!) 124   Resp: 16   Temp: 98.1 °F (36.7 °C)   SpO2: 92%     Physical Exam  Eyes:      General: Lids are normal. Lids are everted, no foreign bodies appreciated.         Right eye: No foreign body or discharge.      Extraocular Movements:      Right eye: Normal extraocular motion and no nystagmus.      Conjunctiva/sclera:      Right eye: Right conjunctiva is injected.      Pupils: Pupils are equal, round, and reactive to light.      Right eye: Corneal abrasion and fluorescein uptake present. Maria Antonia exam negative.        Comments: Unable to check visual acuity as pt has CP and is non-verbal.     Corneal abrasion as indicated above.          GENERAL: not distressed  HENT: nares patent  EYES: See above  NECK: no ROM limitations  CV: regular rhythm, regular rate  RESPIRATORY: normal effort  ABDOMEN: soft  : deferred  MUSCULOSKELETAL: no deformity  NEURO: alert, moves all extremities, follows commands  SKIN: warm, dry    PROGRESS, DATA ANALYSIS, CONSULTS AND MEDICAL DECISION MAKING    Please note that this section constitutes my independent interpretation of clinical data including lab results, radiology, EKG's.  This constitutes my independent professional opinion regarding differential diagnosis and management of this patient.  It may include any factors such as history from outside sources, review of external records, social determinants of health, management of medications, response to those treatments, and discussions with other providers.       Orders placed during this visit:  Orders Placed This Encounter   Procedures    Stephens lamp to bedside  Misc Nursing Order (Specify)            Medical Decision Making  Problems Addressed:  Corneal abrasion, right, initial encounter: complicated acute illness or injury    Risk  Prescription drug management.    Patient presents with Scleral injection. No recent eye trauma  or suspected microtrauma (dust, sand, etc). Pt does not move his arms therefore could not have rubbed this to cause injury but I cannot exclude injury from removing clothing or during sleep against blankets.  Negative Maria Antonia sign, There is a corneal abrasion. Mild discharge but it is clear. I considered pinkeye but think this is more scleral irritation due to corneal abrasion seen on staining today.   Given history and exam I have low suspicion for globe rupture, uveitis, HSV keratitis, Endopthalmitis, Foreign Body. Plan to treat for corneal abrasion with EES ointment.  Pt is established with Dr. [facility] and I have recommended follow up in 1-2 days even if doing better.       DIAGNOSIS  Final diagnoses:   Corneal abrasion, right, initial encounter          Medication List        New Prescriptions      erythromycin 5 MG/GM ophthalmic ointment  Commonly known as: ROMYCIN  Administer  to the right eye Every 6 (Six) Hours for 5 days.               Where to Get Your Medications        These medications were sent to [facility] - [place], [place] - [address] [facility] - [phone] [phone]   [id number] [place] [id number]      Phone: [phone]   erythromycin 5 MG/GM ophthalmic ointment         FOLLOW-UP  [facility] - [place] [id number]  [phone]  Schedule an appointment as soon as possible for a visit in 1 day          Latest Documented Vital Signs:  As of 13:36 EDT  BP- 124/85 HR- (!) 124 Temp- 98.1 °F (36.7 °C) (Tympanic) O2 sat- 92%    Appropriate PPE utilized throughout this patient encounter to include mask, if indicated, per current protocol. Hand hygiene was performed before donning PPE and after removal when leaving the room.    Please note that portions of this were completed with a voice recognition program.     Note Disclaimer: At [facility], we believe that sharing information builds trust and  better relationships. You are receiving this note because you are receiving care at [facility] or recently visited. It is possible you will see health information before a provider has talked with you about it. This kind of information can be easy to misunderstand. To help you fully understand what it means for your health, we urge you to discuss this note with your provider.

## 2025-05-21 NOTE — DISCHARGE INSTRUCTIONS
Caregiver to do eye ointment every 6 hours while awake for 5 days.    Wash hands before and after using eye ointment    It is very important with patient follow-up with his eye care provider in 1 to 2 days for a recheck, EVEN IF DOING BETTER    Return Precautions    Although you are being discharged from the ED today, I encourage you to return for worsening symptoms.  Things can, and do, change such that treatment at home with medication may not be adequate.      Specifically, return for any of the following:    Chest pain, shortness of breath, pain or nausea and vomiting not controlled by medications provided.    Please make a follow up with your Primary Care Provider for a blood pressure recheck.

## (undated) DEVICE — THE SINGLE USE ETRAP – POLYP TRAP IS USED FOR SUCTION RETRIEVAL OF ENDOSCOPICALLY REMOVED POLYPS.: Brand: ETRAP

## (undated) DEVICE — DRSNG SURESITE WNDW 4X4.5

## (undated) DEVICE — SYR LL 3CC

## (undated) DEVICE — SUT ETHLN 3/0 PSL BLK MONO SA 30IN 1691H

## (undated) DEVICE — GLV SURG BIOGEL LTX PF 8

## (undated) DEVICE — STPLR SKIN VISISTAT WD 35CT

## (undated) DEVICE — SOL ISO/ALC RUB 70PCT 4OZ

## (undated) DEVICE — FREEHAND DRILL

## (undated) DEVICE — SPNG GZ WOVN 4X4IN 12PLY 10/BX STRL

## (undated) DEVICE — APPL DURAPREP IODOPHOR APL 26ML

## (undated) DEVICE — FRCP BX RADJAW4 NDL 2.8 240CM LG OG BX40

## (undated) DEVICE — GLV SURG SENSICARE PI LF PF 8 GRN STRL

## (undated) DEVICE — ADAPT CLN BIOGUARD AIR/H2O DISP

## (undated) DEVICE — TRAP FLD MINIVAC MEGADYNE 100ML

## (undated) DEVICE — PK HIP PINNING 40

## (undated) DEVICE — DRILL-TIP RECON K-WIRE
Type: IMPLANTABLE DEVICE | Site: FEMUR | Status: NON-FUNCTIONAL
Removed: 2021-10-11

## (undated) DEVICE — DRAPE,REIN 53X77,STERILE: Brand: MEDLINE

## (undated) DEVICE — THREADED RECON K-WIRE
Type: IMPLANTABLE DEVICE | Site: FEMUR | Status: NON-FUNCTIONAL
Removed: 2021-10-11

## (undated) DEVICE — SUT VIC 2/0 CT2 27IN J269H

## (undated) DEVICE — KT ORCA ORCAPOD DISP STRL

## (undated) DEVICE — SAFELINER SUCTION CANISTER 1000CC: Brand: DEROYAL

## (undated) DEVICE — SUT VIC 0 CT1 36IN J946H

## (undated) DEVICE — 1010 S-DRAPE TOWEL DRAPE 10/BX: Brand: STERI-DRAPE™

## (undated) DEVICE — SNAR POLYP CAPTIFLX MICRO OVL 13MM 240CM

## (undated) DEVICE — NEEDLE, QUINCKE, 20GX3.5": Brand: MEDLINE

## (undated) DEVICE — VITAL SIGNS™ ADULT ANESTHESIA BREATHING CIRCUIT: Brand: VITAL SIGNS™

## (undated) DEVICE — DRSNG GZ PETROLTM XEROFORM CURAD 1X8IN STRL

## (undated) DEVICE — BW-412T DISP COMBO CLEANING BRUSH: Brand: SINGLE USE COMBINATION CLEANING BRUSH

## (undated) DEVICE — Device

## (undated) DEVICE — RECON LAG SCREW DRILL

## (undated) DEVICE — THE BITE BLOCK MAXI, LATEX FREE STRAP IS USED TO PROTECT THE ENDOSCOPE INSERTION TUBE FROM BEING BITTEN BY THE PATIENT.

## (undated) DEVICE — GUIDE WIRE, BALL-TIPPED, STERILE

## (undated) DEVICE — DRP C/ARMOR

## (undated) DEVICE — VIAL FORMALIN CAP 10P 40ML

## (undated) DEVICE — GOWN ,SIRUS,NONREINFORCED 3XL: Brand: MEDLINE

## (undated) DEVICE — SPNG LAP 18X18IN LF STRL PK/5

## (undated) DEVICE — REAMER SHAFT, MOD.TRINKLE: Brand: BIXCUT

## (undated) DEVICE — MAT FLR ABSORBENT LG 4FT 10 2.5FT

## (undated) DEVICE — SUT MNCRYL 2/0 SH 27IN Y417H